# Patient Record
Sex: MALE | Race: WHITE | NOT HISPANIC OR LATINO | Employment: FULL TIME | URBAN - METROPOLITAN AREA
[De-identification: names, ages, dates, MRNs, and addresses within clinical notes are randomized per-mention and may not be internally consistent; named-entity substitution may affect disease eponyms.]

---

## 2019-01-09 ENCOUNTER — OFFICE VISIT (OUTPATIENT)
Dept: INTERNAL MEDICINE | Facility: CLINIC | Age: 39
End: 2019-01-09
Payer: OTHER GOVERNMENT

## 2019-01-09 VITALS
WEIGHT: 202.63 LBS | BODY MASS INDEX: 29.01 KG/M2 | HEART RATE: 65 BPM | HEIGHT: 70 IN | DIASTOLIC BLOOD PRESSURE: 89 MMHG | SYSTOLIC BLOOD PRESSURE: 125 MMHG | TEMPERATURE: 98 F

## 2019-01-09 DIAGNOSIS — Z00.00 GENERAL MEDICAL EXAM: Primary | ICD-10-CM

## 2019-01-09 DIAGNOSIS — E66.3 OVERWEIGHT (BMI 25.0-29.9): ICD-10-CM

## 2019-01-09 DIAGNOSIS — K21.9 GASTROESOPHAGEAL REFLUX DISEASE, ESOPHAGITIS PRESENCE NOT SPECIFIED: ICD-10-CM

## 2019-01-09 PROCEDURE — 99999 PR PBB SHADOW E&M-NEW PATIENT-LVL III: ICD-10-PCS | Mod: PBBFAC,,, | Performed by: FAMILY MEDICINE

## 2019-01-09 PROCEDURE — 99999 PR PBB SHADOW E&M-NEW PATIENT-LVL III: CPT | Mod: PBBFAC,,, | Performed by: FAMILY MEDICINE

## 2019-01-09 PROCEDURE — 99395 PREV VISIT EST AGE 18-39: CPT | Mod: S$GLB,,, | Performed by: FAMILY MEDICINE

## 2019-01-09 PROCEDURE — 99395 PR PREVENTIVE VISIT,EST,18-39: ICD-10-PCS | Mod: S$GLB,,, | Performed by: FAMILY MEDICINE

## 2019-01-09 RX ORDER — PANTOPRAZOLE SODIUM 40 MG/1
40 TABLET, DELAYED RELEASE ORAL DAILY
Qty: 30 TABLET | Refills: 1 | Status: SHIPPED | OUTPATIENT
Start: 2019-01-09 | End: 2019-03-13 | Stop reason: SDUPTHER

## 2019-01-10 PROBLEM — K21.9 GASTROESOPHAGEAL REFLUX DISEASE: Status: ACTIVE | Noted: 2019-01-10

## 2019-01-10 PROBLEM — E66.3 OVERWEIGHT (BMI 25.0-29.9): Status: ACTIVE | Noted: 2019-01-10

## 2019-01-10 NOTE — PROGRESS NOTES
Subjective:   Patient ID: Jayesh Rose is a 38 y.o. male.    Chief Complaint: Annual Exam and Establish Care      HPI  37 yo male here for annual exam.     Patient queried and denies any further complaints    PREVENTIVE MED  Diet  Exercise  Colorectal Ca  Alcohol use  Tobacco  BP  Depression  Type 2 DM  Hep C  STD  Vision  ALL REVIEWED        PAST MEDICAL HISTORY:  History reviewed. No pertinent past medical history.    PAST SURGICAL HISTORY:  History reviewed. No pertinent surgical history.    SOCIAL HISTORY:  Social History     Socioeconomic History    Marital status:      Spouse name: Not on file    Number of children: Not on file    Years of education: Not on file    Highest education level: Not on file   Social Needs    Financial resource strain: Not on file    Food insecurity - worry: Not on file    Food insecurity - inability: Not on file    Transportation needs - medical: Not on file    Transportation needs - non-medical: Not on file   Occupational History    Not on file   Tobacco Use    Smoking status: Never Smoker   Substance and Sexual Activity    Alcohol use: Not on file    Drug use: No    Sexual activity: Not on file   Other Topics Concern    Not on file   Social History Narrative    Not on file       FAMILY HISTORY:  Family History   Problem Relation Age of Onset    Mental illness Mother     Heart disease Father     Mental illness Father     Heart disease Brother     Cancer Maternal Grandfather     Cancer Paternal Grandfather        ALLERGIES AND MEDICATIONS: updated and reviewed.  Review of patient's allergies indicates:  No Known Allergies    Current Outpatient Medications:     pantoprazole (PROTONIX) 40 MG tablet, Take 1 tablet (40 mg total) by mouth once daily., Disp: 30 tablet, Rfl: 1    Review of Systems   Constitutional: Negative for activity change, appetite change, chills, diaphoresis, fatigue, fever and unexpected weight change.   HENT: Negative for  "congestion, ear discharge, ear pain, facial swelling, hearing loss, nosebleeds, postnasal drip, rhinorrhea, sinus pressure, sneezing, sore throat, tinnitus, trouble swallowing and voice change.    Eyes: Negative for photophobia, pain, discharge, redness, itching and visual disturbance.   Respiratory: Negative for cough, chest tightness, shortness of breath and wheezing.    Cardiovascular: Negative for chest pain, palpitations and leg swelling.   Gastrointestinal: Negative for abdominal distention, abdominal pain, anal bleeding, blood in stool, constipation, diarrhea, nausea, rectal pain and vomiting.   Endocrine: Negative for cold intolerance, heat intolerance, polydipsia, polyphagia and polyuria.   Genitourinary: Negative for difficulty urinating, dysuria and flank pain.   Musculoskeletal: Negative for arthralgias, back pain, joint swelling, myalgias and neck pain.   Skin: Negative for rash.   Neurological: Negative for dizziness, tremors, seizures, syncope, speech difficulty, weakness, light-headedness, numbness and headaches.   Psychiatric/Behavioral: Negative for behavioral problems, confusion, decreased concentration, dysphoric mood, sleep disturbance and suicidal ideas. The patient is not nervous/anxious and is not hyperactive.        Objective:     Vitals:    01/09/19 1405   BP: 125/89   Pulse: 65   Temp: 98.1 °F (36.7 °C)   TempSrc: Oral   Weight: 91.9 kg (202 lb 9.6 oz)   Height: 5' 10" (1.778 m)   PainSc:   4   PainLoc: Back     Body mass index is 29.07 kg/m².    Physical Exam   Constitutional: He is oriented to person, place, and time. He appears well-developed and well-nourished. He is cooperative. He does not have a sickly appearance. No distress.   HENT:   Head: Normocephalic and atraumatic.   Right Ear: Hearing, tympanic membrane, external ear and ear canal normal. No tenderness.   Left Ear: Hearing, tympanic membrane, external ear and ear canal normal. No tenderness.   Nose: Nose normal. "   Mouth/Throat: Oropharynx is clear and moist.   Eyes: Conjunctivae and lids are normal. Pupils are equal, round, and reactive to light. Right eye exhibits no discharge. Left eye exhibits no discharge. Right conjunctiva is not injected. Left conjunctiva is not injected. No scleral icterus. Right eye exhibits normal extraocular motion. Left eye exhibits normal extraocular motion.   Neck: Normal range of motion. Neck supple. No JVD present. Carotid bruit is not present. No tracheal deviation and no edema present. No thyromegaly present.   Cardiovascular: Normal rate, regular rhythm, normal heart sounds and normal pulses. Exam reveals no friction rub.   No murmur heard.  Pulmonary/Chest: Effort normal and breath sounds normal. No accessory muscle usage. No respiratory distress. He has no wheezes. He has no rhonchi. He has no rales.   Abdominal: Soft. Bowel sounds are normal. He exhibits no distension, no abdominal bruit, no pulsatile midline mass and no mass. There is no hepatosplenomegaly. There is no tenderness. There is no rebound, no guarding, no CVA tenderness, no tenderness at McBurney's point and negative Hussein's sign.   Musculoskeletal: He exhibits no edema.   Lymphadenopathy:        Head (right side): No submandibular, no preauricular and no posterior auricular adenopathy present.        Head (left side): No submandibular, no preauricular and no posterior auricular adenopathy present.     He has no cervical adenopathy.   Neurological: He is alert and oriented to person, place, and time. GCS eye subscore is 4. GCS verbal subscore is 5. GCS motor subscore is 6.   Skin: Skin is warm and dry. No ecchymosis and no rash noted. Rash is not maculopapular and not urticarial. He is not diaphoretic. No cyanosis or erythema. Nails show no clubbing.   Psychiatric: He has a normal mood and affect. His speech is normal and behavior is normal. Thought content normal. His mood appears not anxious. His affect is not angry and  not inappropriate. He does not exhibit a depressed mood.   Nursing note and vitals reviewed.      Assessment and Plan:   Jayesh was seen today for annual exam and establish care.    Diagnoses and all orders for this visit:    General medical exam  -     Echocardiogram stress test (Cupid Only); Future  -     Cancel: CBC auto differential; Future  -     Cancel: Comprehensive metabolic panel; Future  -     Cancel: Hemoglobin A1c; Future  -     Cancel: Lipid panel; Future  -     Cancel: TSH; Future  -     Cancel: T4, free; Future  -     CBC auto differential; Future  -     Comprehensive metabolic panel; Future  -     Hemoglobin A1c; Future  -     Lipid panel; Future  -     TSH; Future  -     T4, free; Future  -     CBC auto differential  -     Comprehensive metabolic panel  -     Hemoglobin A1c  -     Lipid panel  -     TSH  -     T4, free    Overweight (BMI 25.0-29.9)    Gastroesophageal reflux disease, esophagitis presence not specified    Other orders  -     pantoprazole (PROTONIX) 40 MG tablet; Take 1 tablet (40 mg total) by mouth once daily.        Follow-up in about 6 months (around 7/9/2019).      THIS NOTE WILL BE SHARED WITH THE PATIENT.

## 2019-01-15 ENCOUNTER — CLINICAL SUPPORT (OUTPATIENT)
Dept: CARDIOLOGY | Facility: CLINIC | Age: 39
End: 2019-01-15
Attending: FAMILY MEDICINE
Payer: OTHER GOVERNMENT

## 2019-01-15 VITALS — BODY MASS INDEX: 28.92 KG/M2 | HEIGHT: 70 IN | WEIGHT: 202 LBS

## 2019-01-15 DIAGNOSIS — Z00.00 GENERAL MEDICAL EXAM: ICD-10-CM

## 2019-01-15 LAB
AORTIC ROOT ANNULUS: 1.88 CM
ASCENDING AORTA: 3.12 CM
BSA FOR ECHO PROCEDURE: 2.13 M2
CV ECHO LV RWT: 0.32 CM
CV STRESS BASE HR: 77
DIASTOLIC BLOOD PRESSURE: 76
DOP CALC LVOT AREA: 3.17 CM2
DOP CALC LVOT DIAMETER: 2.01 CM
DOP CALC LVOT STROKE VOLUME: 91.59 CM3
DOP CALCLVOT PEAK VEL VTI: 28.88 CM
E WAVE DECELERATION TIME: 218.01 MSEC
E/A RATIO: 1.17
E/E' RATIO: 5.79
ECHO LV POSTERIOR WALL: 0.78 CM (ref 0.6–1.1)
FRACTIONAL SHORTENING: 51 % (ref 28–44)
INTERVENTRICULAR SEPTUM: 0.71 CM (ref 0.6–1.1)
IVRT: 0.08 MSEC
LA MAJOR: 5.58 CM
LA MINOR: 5.77 CM
LA WIDTH: 3.51 CM
LEFT ATRIUM SIZE: 3.84 CM
LEFT ATRIUM VOLUME INDEX: 31 ML/M2
LEFT ATRIUM VOLUME: 65 CM3
LEFT INTERNAL DIMENSION IN SYSTOLE: 2.39 CM (ref 2.1–4)
LEFT VENTRICLE DIASTOLIC VOLUME INDEX: 54.44 ML/M2
LEFT VENTRICLE DIASTOLIC VOLUME: 114.12 ML
LEFT VENTRICLE MASS INDEX: 57.6 G/M2
LEFT VENTRICLE SYSTOLIC VOLUME INDEX: 9.5 ML/M2
LEFT VENTRICLE SYSTOLIC VOLUME: 20.01 ML
LEFT VENTRICULAR INTERNAL DIMENSION IN DIASTOLE: 4.92 CM (ref 3.5–6)
LEFT VENTRICULAR MASS: 120.64 G
LV LATERAL E/E' RATIO: 5.06
LV SEPTAL E/E' RATIO: 6.75
MV PEAK A VEL: 0.69 M/S
MV PEAK E VEL: 0.81 M/S
OHS CV CPX 1 MINUTE RECOVERY HEART RATE: 141 BPM
OHS CV CPX 85 PERCENT MAX PREDICTED HEART RATE MALE: 156
OHS CV CPX ESTIMATED METS: 15
OHS CV CPX MAX PREDICTED HEART RATE: 184
OHS CV CPX PATIENT IS FEMALE: 0
OHS CV CPX PATIENT IS MALE: 1
OHS CV CPX PEAK DIASTOLIC BLOOD PRESSURE: 71 MMHG
OHS CV CPX PEAK HEAR RATE: 171
OHS CV CPX PEAK RATE PRESSURE PRODUCT: NORMAL
OHS CV CPX PEAK SYSTOLIC BLOOD PRESSURE: 155
OHS CV CPX PERCENT MAX PREDICTED HEART RATE ACHIEVED: 93
OHS CV CPX PERCENT TARGET HEART RATE ACHIEVED: 109.62
OHS CV CPX RATE PRESSURE PRODUCT PRESENTING: NORMAL
OHS CV CPX TARGET HEART RATE: 156
PULM VEIN S/D RATIO: 0.81
PV PEAK D VEL: 0.75 M/S
PV PEAK S VEL: 0.61 M/S
RA MAJOR: 4.83 CM
RA PRESSURE: 3 MMHG
RA WIDTH: 2.78 CM
RIGHT VENTRICULAR END-DIASTOLIC DIMENSION: 3.27 CM
STJ: 3.21 CM
STRESS ECHO POST EXERCISE DUR MIN: 9 MIN
STRESS ECHO POST EXERCISE DUR SEC: 16
SYSTOLIC BLOOD PRESSURE: 137
TDI LATERAL: 0.16
TDI SEPTAL: 0.12
TDI: 0.14

## 2019-01-15 PROCEDURE — 93351 STRESS TTE COMPLETE: CPT | Mod: S$GLB,,, | Performed by: INTERNAL MEDICINE

## 2019-01-15 PROCEDURE — 93351 ECHOCARDIOGRAM STRESS TEST (CUPID ONLY): ICD-10-PCS | Mod: S$GLB,,, | Performed by: INTERNAL MEDICINE

## 2019-01-15 PROCEDURE — 99999 PR PBB SHADOW E&M-EST. PATIENT-LVL I: ICD-10-PCS | Mod: PBBFAC,,,

## 2019-01-15 PROCEDURE — 99999 PR PBB SHADOW E&M-EST. PATIENT-LVL I: CPT | Mod: PBBFAC,,,

## 2019-01-16 ENCOUNTER — TELEPHONE (OUTPATIENT)
Dept: INTERNAL MEDICINE | Facility: CLINIC | Age: 39
End: 2019-01-16

## 2019-01-16 NOTE — TELEPHONE ENCOUNTER
----- Message from Du Shaw MD sent at 1/15/2019  4:25 PM CST -----  The stress echocardiogram report was normal. Good news! Encourage pt to sign up for [facility]. Thank you

## 2019-01-17 LAB
ALBUMIN SERPL-MCNC: 4.5 G/DL (ref 3.6–5.1)
ALBUMIN/GLOB SERPL: 1.9 (CALC) (ref 1–2.5)
ALP SERPL-CCNC: 69 U/L (ref 40–115)
ALT SERPL-CCNC: 20 U/L (ref 9–46)
AST SERPL-CCNC: 18 U/L (ref 10–40)
BASOPHILS # BLD AUTO: 32 CELLS/UL (ref 0–200)
BASOPHILS NFR BLD AUTO: 0.4 %
BILIRUB SERPL-MCNC: 0.4 MG/DL (ref 0.2–1.2)
BUN SERPL-MCNC: 15 MG/DL (ref 7–25)
BUN/CREAT SERPL: NORMAL (CALC) (ref 6–22)
CALCIUM SERPL-MCNC: 9.6 MG/DL (ref 8.6–10.3)
CHLORIDE SERPL-SCNC: 104 MMOL/L (ref 98–110)
CHOLEST SERPL-MCNC: 198 MG/DL
CHOLEST/HDLC SERPL: 3.8 (CALC)
CO2 SERPL-SCNC: 25 MMOL/L (ref 20–32)
CREAT SERPL-MCNC: 1.15 MG/DL (ref 0.6–1.35)
EOSINOPHIL # BLD AUTO: 464 CELLS/UL (ref 15–500)
EOSINOPHIL NFR BLD AUTO: 5.8 %
ERYTHROCYTE [DISTWIDTH] IN BLOOD BY AUTOMATED COUNT: 12.6 % (ref 11–15)
GFR SERPL CREATININE-BSD FRML MDRD: 81 ML/MIN/1.73M2
GLOBULIN SER CALC-MCNC: 2.4 G/DL (CALC) (ref 1.9–3.7)
GLUCOSE SERPL-MCNC: 92 MG/DL (ref 65–99)
HBA1C MFR BLD: 5 % OF TOTAL HGB
HCT VFR BLD AUTO: 47.7 % (ref 38.5–50)
HDLC SERPL-MCNC: 52 MG/DL
HGB BLD-MCNC: 16 G/DL (ref 13.2–17.1)
LDLC SERPL CALC-MCNC: 118 MG/DL (CALC)
LYMPHOCYTES # BLD AUTO: 3016 CELLS/UL (ref 850–3900)
LYMPHOCYTES NFR BLD AUTO: 37.7 %
MCH RBC QN AUTO: 28.4 PG (ref 27–33)
MCHC RBC AUTO-ENTMCNC: 33.5 G/DL (ref 32–36)
MCV RBC AUTO: 84.6 FL (ref 80–100)
MONOCYTES # BLD AUTO: 680 CELLS/UL (ref 200–950)
MONOCYTES NFR BLD AUTO: 8.5 %
NEUTROPHILS # BLD AUTO: 3808 CELLS/UL (ref 1500–7800)
NEUTROPHILS NFR BLD AUTO: 47.6 %
NONHDLC SERPL-MCNC: 146 MG/DL (CALC)
PLATELET # BLD AUTO: 231 THOUSAND/UL (ref 140–400)
PMV BLD REES-ECKER: 11.6 FL (ref 7.5–12.5)
POTASSIUM SERPL-SCNC: 4 MMOL/L (ref 3.5–5.3)
PROT SERPL-MCNC: 6.9 G/DL (ref 6.1–8.1)
RBC # BLD AUTO: 5.64 MILLION/UL (ref 4.2–5.8)
SODIUM SERPL-SCNC: 141 MMOL/L (ref 135–146)
T4 FREE SERPL-MCNC: 1.2 NG/DL (ref 0.8–1.8)
TRIGL SERPL-MCNC: 168 MG/DL
TSH SERPL-ACNC: 0.5 MIU/L (ref 0.4–4.5)
WBC # BLD AUTO: 8 THOUSAND/UL (ref 3.8–10.8)

## 2019-01-19 ENCOUNTER — PATIENT MESSAGE (OUTPATIENT)
Dept: INTERNAL MEDICINE | Facility: CLINIC | Age: 39
End: 2019-01-19

## 2019-01-21 ENCOUNTER — PATIENT MESSAGE (OUTPATIENT)
Dept: INTERNAL MEDICINE | Facility: CLINIC | Age: 39
End: 2019-01-21

## 2019-03-13 RX ORDER — PANTOPRAZOLE SODIUM 40 MG/1
TABLET, DELAYED RELEASE ORAL
Qty: 30 TABLET | Refills: 0 | Status: SHIPPED | OUTPATIENT
Start: 2019-03-13 | End: 2023-05-18

## 2019-03-18 ENCOUNTER — PATIENT MESSAGE (OUTPATIENT)
Dept: INTERNAL MEDICINE | Facility: CLINIC | Age: 39
End: 2019-03-18

## 2020-09-11 ENCOUNTER — LAB VISIT (OUTPATIENT)
Dept: URGENT CARE | Facility: CLINIC | Age: 40
End: 2020-09-11

## 2020-09-11 DIAGNOSIS — Z03.818 ENCOUNTER FOR OBSERVATION FOR SUSPECTED EXPOSURE TO OTHER BIOLOGICAL AGENTS RULED OUT: ICD-10-CM

## 2020-09-11 LAB
CTP QC/QA: YES
SARS-COV-2 RDRP RESP QL NAA+PROBE: NEGATIVE

## 2020-09-11 PROCEDURE — U0002 COVID-19 LAB TEST NON-CDC: HCPCS | Mod: S$GLB,,, | Performed by: EMERGENCY MEDICINE

## 2020-09-11 PROCEDURE — U0002: ICD-10-PCS | Mod: S$GLB,,, | Performed by: EMERGENCY MEDICINE

## 2020-10-08 ENCOUNTER — PATIENT MESSAGE (OUTPATIENT)
Dept: PRIMARY CARE CLINIC | Facility: CLINIC | Age: 40
End: 2020-10-08

## 2020-11-12 ENCOUNTER — LAB VISIT (OUTPATIENT)
Dept: URGENT CARE | Facility: CLINIC | Age: 40
End: 2020-11-12
Payer: OTHER GOVERNMENT

## 2020-11-12 DIAGNOSIS — Z03.818 ENCOUNTER FOR OBSERVATION FOR SUSPECTED EXPOSURE TO OTHER BIOLOGICAL AGENTS RULED OUT: ICD-10-CM

## 2020-11-12 PROCEDURE — U0003 INFECTIOUS AGENT DETECTION BY NUCLEIC ACID (DNA OR RNA); SEVERE ACUTE RESPIRATORY SYNDROME CORONAVIRUS 2 (SARS-COV-2) (CORONAVIRUS DISEASE [COVID-19]), AMPLIFIED PROBE TECHNIQUE, MAKING USE OF HIGH THROUGHPUT TECHNOLOGIES AS DESCRIBED BY CMS-2020-01-R: HCPCS

## 2020-11-13 LAB — SARS-COV-2 RNA RESP QL NAA+PROBE: NOT DETECTED

## 2020-11-19 ENCOUNTER — LAB VISIT (OUTPATIENT)
Dept: URGENT CARE | Facility: CLINIC | Age: 40
End: 2020-11-19
Payer: OTHER GOVERNMENT

## 2020-11-19 DIAGNOSIS — Z03.818 ENCOUNTER FOR OBSERVATION FOR SUSPECTED EXPOSURE TO OTHER BIOLOGICAL AGENTS RULED OUT: ICD-10-CM

## 2020-11-19 LAB — SARS-COV-2 RNA RESP QL NAA+PROBE: NOT DETECTED

## 2020-11-19 PROCEDURE — U0003 INFECTIOUS AGENT DETECTION BY NUCLEIC ACID (DNA OR RNA); SEVERE ACUTE RESPIRATORY SYNDROME CORONAVIRUS 2 (SARS-COV-2) (CORONAVIRUS DISEASE [COVID-19]), AMPLIFIED PROBE TECHNIQUE, MAKING USE OF HIGH THROUGHPUT TECHNOLOGIES AS DESCRIBED BY CMS-2020-01-R: HCPCS

## 2020-12-17 ENCOUNTER — LAB VISIT (OUTPATIENT)
Dept: URGENT CARE | Facility: CLINIC | Age: 40
End: 2020-12-17
Attending: INTERNAL MEDICINE
Payer: OTHER GOVERNMENT

## 2020-12-17 DIAGNOSIS — Z03.818 ENCOUNTER FOR OBSERVATION FOR SUSPECTED EXPOSURE TO OTHER BIOLOGICAL AGENTS RULED OUT: ICD-10-CM

## 2020-12-17 PROCEDURE — U0003 INFECTIOUS AGENT DETECTION BY NUCLEIC ACID (DNA OR RNA); SEVERE ACUTE RESPIRATORY SYNDROME CORONAVIRUS 2 (SARS-COV-2) (CORONAVIRUS DISEASE [COVID-19]), AMPLIFIED PROBE TECHNIQUE, MAKING USE OF HIGH THROUGHPUT TECHNOLOGIES AS DESCRIBED BY CMS-2020-01-R: HCPCS

## 2020-12-18 LAB — SARS-COV-2 RNA RESP QL NAA+PROBE: NOT DETECTED

## 2020-12-30 ENCOUNTER — LAB VISIT (OUTPATIENT)
Dept: URGENT CARE | Facility: CLINIC | Age: 40
End: 2020-12-30
Payer: OTHER GOVERNMENT

## 2020-12-30 DIAGNOSIS — Z03.818 ENCOUNTER FOR OBSERVATION FOR SUSPECTED EXPOSURE TO OTHER BIOLOGICAL AGENTS RULED OUT: ICD-10-CM

## 2020-12-30 PROCEDURE — U0003 INFECTIOUS AGENT DETECTION BY NUCLEIC ACID (DNA OR RNA); SEVERE ACUTE RESPIRATORY SYNDROME CORONAVIRUS 2 (SARS-COV-2) (CORONAVIRUS DISEASE [COVID-19]), AMPLIFIED PROBE TECHNIQUE, MAKING USE OF HIGH THROUGHPUT TECHNOLOGIES AS DESCRIBED BY CMS-2020-01-R: HCPCS

## 2020-12-31 LAB — SARS-COV-2 RNA RESP QL NAA+PROBE: NOT DETECTED

## 2021-01-04 ENCOUNTER — PATIENT MESSAGE (OUTPATIENT)
Dept: ADMINISTRATIVE | Facility: HOSPITAL | Age: 41
End: 2021-01-04

## 2021-01-07 ENCOUNTER — OFFICE VISIT (OUTPATIENT)
Dept: UROLOGY | Facility: CLINIC | Age: 41
End: 2021-01-07
Attending: UROLOGY
Payer: OTHER GOVERNMENT

## 2021-01-07 VITALS
WEIGHT: 202 LBS | HEIGHT: 70 IN | BODY MASS INDEX: 28.92 KG/M2 | DIASTOLIC BLOOD PRESSURE: 70 MMHG | SYSTOLIC BLOOD PRESSURE: 119 MMHG | HEART RATE: 66 BPM

## 2021-01-07 DIAGNOSIS — Z30.09 ENCOUNTER FOR VASECTOMY COUNSELING: Primary | ICD-10-CM

## 2021-01-07 PROCEDURE — 99204 PR OFFICE/OUTPT VISIT, NEW, LEVL IV, 45-59 MIN: ICD-10-PCS | Mod: S$GLB,,, | Performed by: UROLOGY

## 2021-01-07 PROCEDURE — 99204 OFFICE O/P NEW MOD 45 MIN: CPT | Mod: S$GLB,,, | Performed by: UROLOGY

## 2021-01-07 RX ORDER — LEVETIRACETAM 1000 MG/1
1000 TABLET ORAL DAILY
Status: ON HOLD | COMMUNITY
Start: 2020-12-02 | End: 2023-05-24 | Stop reason: HOSPADM

## 2021-01-07 RX ORDER — DIAZEPAM 5 MG/1
5 TABLET ORAL ONCE
Qty: 1 TABLET | Refills: 0 | Status: ON HOLD | OUTPATIENT
Start: 2021-01-07 | End: 2023-05-24 | Stop reason: HOSPADM

## 2021-02-15 ENCOUNTER — PROCEDURE VISIT (OUTPATIENT)
Dept: UROLOGY | Facility: CLINIC | Age: 41
End: 2021-02-15
Attending: UROLOGY
Payer: OTHER GOVERNMENT

## 2021-02-15 VITALS
SYSTOLIC BLOOD PRESSURE: 109 MMHG | WEIGHT: 202 LBS | DIASTOLIC BLOOD PRESSURE: 78 MMHG | HEIGHT: 70 IN | BODY MASS INDEX: 28.92 KG/M2 | HEART RATE: 74 BPM

## 2021-02-15 DIAGNOSIS — Z30.09 ENCOUNTER FOR VASECTOMY COUNSELING: Primary | ICD-10-CM

## 2021-02-15 PROCEDURE — 55250 VASECTOMY: ICD-10-PCS | Mod: S$GLB,,, | Performed by: UROLOGY

## 2021-02-15 PROCEDURE — 55250 REMOVAL OF SPERM DUCT(S): CPT | Mod: S$GLB,,, | Performed by: UROLOGY

## 2021-02-15 RX ORDER — LIDOCAINE HYDROCHLORIDE AND EPINEPHRINE 20; 10 MG/ML; UG/ML
5 INJECTION, SOLUTION INFILTRATION; PERINEURAL
Status: COMPLETED | OUTPATIENT
Start: 2021-02-15 | End: 2021-02-15

## 2021-02-15 RX ADMIN — LIDOCAINE HYDROCHLORIDE AND EPINEPHRINE 5 ML: 20; 10 INJECTION, SOLUTION INFILTRATION; PERINEURAL at 11:02

## 2021-03-12 ENCOUNTER — IMMUNIZATION (OUTPATIENT)
Dept: PRIMARY CARE CLINIC | Facility: CLINIC | Age: 41
End: 2021-03-12
Payer: OTHER GOVERNMENT

## 2021-03-12 DIAGNOSIS — Z23 NEED FOR VACCINATION: Primary | ICD-10-CM

## 2021-03-12 PROCEDURE — 91300 PR SARS-COV- 2 COVID-19 VACCINE, NO PRSV, 30MCG/0.3ML, IM: CPT | Mod: S$GLB,,, | Performed by: INTERNAL MEDICINE

## 2021-03-12 PROCEDURE — 0001A PR IMMUNIZ ADMIN, SARS-COV-2 COVID-19 VACC, 30MCG/0.3ML, 1ST DOSE: CPT | Mod: CV19,S$GLB,, | Performed by: INTERNAL MEDICINE

## 2021-03-12 PROCEDURE — 91300 PR SARS-COV- 2 COVID-19 VACCINE, NO PRSV, 30MCG/0.3ML, IM: ICD-10-PCS | Mod: S$GLB,,, | Performed by: INTERNAL MEDICINE

## 2021-03-12 PROCEDURE — 0001A PR IMMUNIZ ADMIN, SARS-COV-2 COVID-19 VACC, 30MCG/0.3ML, 1ST DOSE: ICD-10-PCS | Mod: CV19,S$GLB,, | Performed by: INTERNAL MEDICINE

## 2021-03-12 RX ADMIN — Medication 0.3 ML: at 02:03

## 2021-04-02 ENCOUNTER — IMMUNIZATION (OUTPATIENT)
Dept: PRIMARY CARE CLINIC | Facility: CLINIC | Age: 41
End: 2021-04-02
Payer: OTHER GOVERNMENT

## 2021-04-02 DIAGNOSIS — Z23 NEED FOR VACCINATION: Primary | ICD-10-CM

## 2021-04-02 PROCEDURE — 91300 PR SARS-COV- 2 COVID-19 VACCINE, NO PRSV, 30MCG/0.3ML, IM: ICD-10-PCS | Mod: S$GLB,,, | Performed by: INTERNAL MEDICINE

## 2021-04-02 PROCEDURE — 0002A PR IMMUNIZ ADMIN, SARS-COV-2 COVID-19 VACC, 30MCG/0.3ML, 2ND DOSE: CPT | Mod: CV19,S$GLB,, | Performed by: INTERNAL MEDICINE

## 2021-04-02 PROCEDURE — 0002A PR IMMUNIZ ADMIN, SARS-COV-2 COVID-19 VACC, 30MCG/0.3ML, 2ND DOSE: ICD-10-PCS | Mod: CV19,S$GLB,, | Performed by: INTERNAL MEDICINE

## 2021-04-02 PROCEDURE — 91300 PR SARS-COV- 2 COVID-19 VACCINE, NO PRSV, 30MCG/0.3ML, IM: CPT | Mod: S$GLB,,, | Performed by: INTERNAL MEDICINE

## 2021-04-02 RX ADMIN — Medication 0.3 ML: at 03:04

## 2021-04-05 ENCOUNTER — PATIENT MESSAGE (OUTPATIENT)
Dept: ADMINISTRATIVE | Facility: HOSPITAL | Age: 41
End: 2021-04-05

## 2021-05-16 ENCOUNTER — PATIENT MESSAGE (OUTPATIENT)
Dept: UROLOGY | Facility: CLINIC | Age: 41
End: 2021-05-16

## 2021-05-17 ENCOUNTER — OFFICE VISIT (OUTPATIENT)
Dept: UROLOGY | Facility: CLINIC | Age: 41
End: 2021-05-17
Attending: UROLOGY
Payer: OTHER GOVERNMENT

## 2021-05-17 VITALS
SYSTOLIC BLOOD PRESSURE: 118 MMHG | WEIGHT: 202 LBS | DIASTOLIC BLOOD PRESSURE: 78 MMHG | HEIGHT: 70 IN | BODY MASS INDEX: 28.92 KG/M2 | HEART RATE: 78 BPM

## 2021-05-17 DIAGNOSIS — Z30.09 ENCOUNTER FOR VASECTOMY COUNSELING: Primary | ICD-10-CM

## 2021-05-17 PROCEDURE — 99024 PR POST-OP FOLLOW-UP VISIT: ICD-10-PCS | Mod: S$GLB,,, | Performed by: UROLOGY

## 2021-05-17 PROCEDURE — 99024 POSTOP FOLLOW-UP VISIT: CPT | Mod: S$GLB,,, | Performed by: UROLOGY

## 2021-07-06 ENCOUNTER — PATIENT MESSAGE (OUTPATIENT)
Dept: ADMINISTRATIVE | Facility: HOSPITAL | Age: 41
End: 2021-07-06

## 2021-10-05 ENCOUNTER — PATIENT MESSAGE (OUTPATIENT)
Dept: ADMINISTRATIVE | Facility: HOSPITAL | Age: 41
End: 2021-10-05

## 2023-04-17 DIAGNOSIS — M50.30 DDD (DEGENERATIVE DISC DISEASE), CERVICAL: Primary | ICD-10-CM

## 2023-04-24 NOTE — PROGRESS NOTES
DATE: 4/27/2023  PATIENT: Jayesh Rose    Supervising Physician: Evans Nance M.D.    CHIEF COMPLAINT: right shoulder and low back pain    HISTORY:  Jayesh Rose is a 42 y.o. male here for initial evaluation of low back and bilateral leg pain (Back - 8, Leg - 8). The pain has been present for years, worsening over time. The patient describes the pain as stiff and aching and radiating down the back of both legs.  The pain is worse in the morning and improved by rest. There is negative associated numbness and tingling. There is negative subjective weakness. Prior treatments have included PT, but no ESIs or surgery.    The patient also has complaints of neck and right shoulder/arm pain (Neck - 8, Arm - 8).  The pain has been present for a few years without injury. The patient describes the pain as aching. The pain is worse with overhead movement and improved by rest. There is mild associated numbness and tingling at night. There is negative subjective weakness. Prior treatments have included no previous PT, ESIs surgery. Pt says his pain has mildly improved recently.     The patient denies myelopathic symptoms such as handwriting changes or difficulty with buttons/coins/keys. Denies perineal paresthesias, bowel/bladder dysfunction.    PAST MEDICAL/SURGICAL HISTORY:  No past medical history on file.  No past surgical history on file.    Medications:   Current Outpatient Medications on File Prior to Visit   Medication Sig Dispense Refill    diazePAM (VALIUM) 5 MG tablet Take 1 tablet (5 mg total) by mouth once. Take 30 minutes before procedure. for 1 dose 1 tablet 0    levETIRAcetam (KEPPRA) 1000 MG tablet Take 1,000 mg by mouth every 12 (twelve) hours.      pantoprazole (PROTONIX) 40 MG tablet TAKE 1 TABLET BY MOUTH EVERY DAY 30 tablet 0     No current facility-administered medications on file prior to visit.       Social History:   Social History     Socioeconomic History    Marital status:     Tobacco Use    Smoking status: Never    Smokeless tobacco: Never   Substance and Sexual Activity    Drug use: No    Sexual activity: Yes       REVIEW OF SYSTEMS:  Constitution: Negative. Negative for chills, fever and night sweats.   Cardiovascular: Negative for chest pain and syncope.   Respiratory: Negative for cough and shortness of breath.   Gastrointestinal: See HPI. Negative for nausea/vomiting. Negative for abdominal pain.  Genitourinary: See HPI. Negative for discoloration or dysuria.  Skin: Negative for dry skin, itching and rash.   Hematologic/Lymphatic: Negative for bleeding problem. Does not bruise/bleed easily.   Musculoskeletal: Negative for falls and muscle weakness.   Neurological: See HPI. No seizures.   Endocrine: Negative for polydipsia, polyphagia and polyuria.   Allergic/Immunologic: Negative for hives and persistent infections.     EXAM:  There were no vitals taken for this visit.    PHYSICAL EXAMINATION:    General: The patient is a very pleasant 42 y.o. male in no apparent distress, the patient is oriented to person, place and time.  Psych: Normal mood and affect  HEENT: Vision grossly intact, hearing intact to the spoken word.  Lungs: Respirations unlabored.  Gait: Normal station and gait, no difficulty with toe or heel walk.   Skin: Cervical skin and dorsal lumbar skin negative for rashes, lesions, hairy patches and surgical scars.    Range of motion: Cervical and lumbar range of motion is acceptable. There is mild tenderness to palpation of the paracervical muscles.  There is mild lumbar tenderness to palpation.  Spinal Balance: Global saggital and coronal spinal balance acceptable, no significant for scoliosis and kyphosis.  Musculoskeletal: No pain with the range of motion of the bilateral shoulders and elbows. Normal bulk and contour of the bilateral hands.  No pain with the range of motion of the bilateral hips. Mild bilateral trochanteric tenderness to palpation.  Vascular:  Bilateral upper and lower extremities warm and well perfused, radial pulses 2+ bilaterally, dorsalis pedis pulses 2+ bilaterally.  Neurological: Normal strength and tone in all major motor groups in the bilateral upper and lower extremities. Normal sensation to light touch in the C5-T1 and L2-S1 dermatomes bilaterally.  Deep tendon reflexes symmetric 2+ in the bilateral upper and lower extremities.  Negative Inverted Radial Reflex and Martin's bilaterally. Negative Babinski bilaterally. Negative straight leg raise bilaterally.     IMAGING:   Today I personally reviewed AP, Lat and Flex/Ex  upright C-spine films that demonstrate disc space narrowing at C5-6.    AP, Lat and Flex/Ex upright lumbar spine films demonstrate significant disc space narrowing at L5-S1.     There is no height or weight on file to calculate BMI.    Hemoglobin A1C   Date Value Ref Range Status   01/16/2019 5.0 <5.7 % of total Hgb Final     Comment:     For the purpose of screening for the presence of  diabetes:     <5.7%       Consistent with the absence of diabetes  5.7-6.4%    Consistent with increased risk for diabetes              (prediabetes)  > or =6.5%  Consistent with diabetes     This assay result is consistent with a decreased risk  of diabetes.     Currently, no consensus exists regarding use of  hemoglobin A1c for diagnosis of diabetes in children.     According to American Diabetes Association (ADA)  guidelines, hemoglobin A1c <7.0% represents optimal  control in non-pregnant diabetic patients. Different  metrics may apply to specific patient populations.   Standards of Medical Care in Diabetes(ADA).               ASSESSMENT/PLAN:    There are no diagnoses linked to this encounter.    Today we discussed at length all of the different treatment options including anti-inflammatories, acetaminophen, rest, ice, heat, physical therapy including strengthening and stretching exercises, home exercises, ROM, aerobic conditioning, aqua  therapy, other modalities including ultrasound, massage, and dry needling, epidural steroid injections and finally surgical intervention.      Pt presents with chronic low back pain and chronic right shoulder pain. Will send PT orders to [facility] and mobic and robaxin to pharmacy. Pt will fu if pain persists.

## 2023-04-27 ENCOUNTER — HOSPITAL ENCOUNTER (OUTPATIENT)
Dept: RADIOLOGY | Facility: HOSPITAL | Age: 43
Discharge: HOME | End: 2023-04-27
Attending: ORTHOPAEDIC SURGERY
Payer: COMMERCIAL

## 2023-04-27 ENCOUNTER — OFFICE VISIT (OUTPATIENT)
Dept: ORTHOPEDICS | Facility: CLINIC | Age: 43
End: 2023-04-27
Payer: COMMERCIAL

## 2023-04-27 VITALS — HEIGHT: 70 IN | BODY MASS INDEX: 29.38 KG/M2 | WEIGHT: 205.25 LBS

## 2023-04-27 DIAGNOSIS — M50.30 DDD (DEGENERATIVE DISC DISEASE), CERVICAL: ICD-10-CM

## 2023-04-27 DIAGNOSIS — M51.36 DDD (DEGENERATIVE DISC DISEASE), LUMBAR: Primary | ICD-10-CM

## 2023-04-27 DIAGNOSIS — M51.36 DDD (DEGENERATIVE DISC DISEASE), LUMBAR: ICD-10-CM

## 2023-04-27 PROCEDURE — 72110 XR LUMBAR SPINE AP AND LAT WITH FLEX/EXT: ICD-10-PCS | Mod: 26,,, | Performed by: RADIOLOGY

## 2023-04-27 PROCEDURE — 72110 X-RAY EXAM L-2 SPINE 4/>VWS: CPT | Mod: TC

## 2023-04-27 PROCEDURE — 72050 XR CERVICAL SPINE AP LAT WITH FLEX EXTEN: ICD-10-PCS | Mod: 26,,, | Performed by: RADIOLOGY

## 2023-04-27 PROCEDURE — 3008F PR BODY MASS INDEX (BMI) DOCUMENTED: ICD-10-PCS | Mod: CPTII,S$GLB,, | Performed by: ORTHOPAEDIC SURGERY

## 2023-04-27 PROCEDURE — 99204 OFFICE O/P NEW MOD 45 MIN: CPT | Mod: S$GLB,,, | Performed by: ORTHOPAEDIC SURGERY

## 2023-04-27 PROCEDURE — 1159F MED LIST DOCD IN RCRD: CPT | Mod: CPTII,S$GLB,, | Performed by: ORTHOPAEDIC SURGERY

## 2023-04-27 PROCEDURE — 72110 X-RAY EXAM L-2 SPINE 4/>VWS: CPT | Mod: 26,,, | Performed by: RADIOLOGY

## 2023-04-27 PROCEDURE — 99204 PR OFFICE/OUTPT VISIT, NEW, LEVL IV, 45-59 MIN: ICD-10-PCS | Mod: S$GLB,,, | Performed by: ORTHOPAEDIC SURGERY

## 2023-04-27 PROCEDURE — 72050 X-RAY EXAM NECK SPINE 4/5VWS: CPT | Mod: TC

## 2023-04-27 PROCEDURE — 1159F PR MEDICATION LIST DOCUMENTED IN MEDICAL RECORD: ICD-10-PCS | Mod: CPTII,S$GLB,, | Performed by: ORTHOPAEDIC SURGERY

## 2023-04-27 PROCEDURE — 99999 PR PBB SHADOW E&M-EST. PATIENT-LVL III: ICD-10-PCS | Mod: PBBFAC,,, | Performed by: ORTHOPAEDIC SURGERY

## 2023-04-27 PROCEDURE — 99999 PR PBB SHADOW E&M-EST. PATIENT-LVL III: CPT | Mod: PBBFAC,,, | Performed by: ORTHOPAEDIC SURGERY

## 2023-04-27 PROCEDURE — 72050 X-RAY EXAM NECK SPINE 4/5VWS: CPT | Mod: 26,,, | Performed by: RADIOLOGY

## 2023-04-27 PROCEDURE — 3008F BODY MASS INDEX DOCD: CPT | Mod: CPTII,S$GLB,, | Performed by: ORTHOPAEDIC SURGERY

## 2023-04-27 RX ORDER — MELOXICAM 7.5 MG/1
7.5 TABLET ORAL DAILY
Qty: 30 TABLET | Refills: 0 | Status: ON HOLD | OUTPATIENT
Start: 2023-04-27 | End: 2023-05-24 | Stop reason: HOSPADM

## 2023-04-27 RX ORDER — METHOCARBAMOL 750 MG/1
750 TABLET, FILM COATED ORAL NIGHTLY PRN
Qty: 30 TABLET | Refills: 0 | Status: ON HOLD | OUTPATIENT
Start: 2023-04-27 | End: 2023-05-24 | Stop reason: HOSPADM

## 2023-04-27 RX ORDER — SERTRALINE HYDROCHLORIDE 100 MG/1
100 TABLET, FILM COATED ORAL DAILY
COMMUNITY
Start: 2023-04-27 | End: 2023-11-09

## 2023-05-12 ENCOUNTER — CLINICAL SUPPORT (OUTPATIENT)
Dept: REHABILITATION | Facility: HOSPITAL | Age: 43
End: 2023-05-12
Payer: COMMERCIAL

## 2023-05-12 DIAGNOSIS — M51.36 DDD (DEGENERATIVE DISC DISEASE), LUMBAR: Primary | ICD-10-CM

## 2023-05-12 PROBLEM — M51.369 DDD (DEGENERATIVE DISC DISEASE), LUMBAR: Status: ACTIVE | Noted: 2023-05-12
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
## 2023-05-18 ENCOUNTER — HOSPITAL ENCOUNTER (INPATIENT)
Facility: HOSPITAL | Age: 43
LOS: 6 days | Discharge: HOME | DRG: 097 | End: 2023-05-24
Attending: EMERGENCY MEDICINE | Admitting: INTERNAL MEDICINE
Payer: COMMERCIAL

## 2023-05-18 DIAGNOSIS — R07.9 CHEST PAIN: ICD-10-CM

## 2023-05-18 DIAGNOSIS — G04.90 ENCEPHALITIS: Primary | ICD-10-CM

## 2023-05-18 DIAGNOSIS — R56.9 SEIZURE: ICD-10-CM

## 2023-05-18 DIAGNOSIS — F41.9 ANXIETY: ICD-10-CM

## 2023-05-18 DIAGNOSIS — M54.50 CHRONIC BILATERAL LOW BACK PAIN WITHOUT SCIATICA: ICD-10-CM

## 2023-05-18 DIAGNOSIS — G89.29 CHRONIC BILATERAL LOW BACK PAIN WITHOUT SCIATICA: ICD-10-CM

## 2023-05-18 PROBLEM — E87.20 METABOLIC ACIDOSIS: Status: ACTIVE | Noted: 2023-05-18

## 2023-05-18 PROBLEM — E66.9 CLASS 1 OBESITY IN ADULT: Status: ACTIVE | Noted: 2023-05-18

## 2023-05-18 PROBLEM — E66.811 CLASS 1 OBESITY IN ADULT: Status: ACTIVE | Noted: 2023-05-18

## 2023-05-18 PROBLEM — G93.41 ENCEPHALOPATHY, METABOLIC: Status: ACTIVE | Noted: 2023-05-18

## 2023-05-18 LAB
ALBUMIN SERPL BCP-MCNC: 4.8 G/DL (ref 3.5–5.2)
ALLENS TEST: ABNORMAL
ALP SERPL-CCNC: 95 U/L (ref 55–135)
ALT SERPL W/O P-5'-P-CCNC: 19 U/L (ref 10–44)
AMPHET+METHAMPHET UR QL: NEGATIVE
ANION GAP SERPL CALC-SCNC: 16 MMOL/L (ref 8–16)
AST SERPL-CCNC: 20 U/L (ref 10–40)
BACTERIA #/AREA URNS AUTO: NORMAL /HPF
BARBITURATES UR QL SCN>200 NG/ML: NEGATIVE
BASOPHILS # BLD AUTO: 0.04 K/UL (ref 0–0.2)
BASOPHILS NFR BLD: 0.2 % (ref 0–1.9)
BENZODIAZ UR QL SCN>200 NG/ML: NEGATIVE
BILIRUB SERPL-MCNC: 0.3 MG/DL (ref 0.1–1)
BILIRUB UR QL STRIP: NEGATIVE
BUN SERPL-MCNC: 13 MG/DL (ref 6–20)
BZE UR QL SCN: NEGATIVE
C GATTII+NEOFOR DNA CSF QL NAA+NON-PROBE: NOT DETECTED
CALCIUM SERPL-MCNC: 9.8 MG/DL (ref 8.7–10.5)
CANNABINOIDS UR QL SCN: ABNORMAL
CHLORIDE SERPL-SCNC: 111 MMOL/L (ref 95–110)
CLARITY CSF: CLEAR
CLARITY UR REFRACT.AUTO: CLEAR
CMV DNA CSF QL NAA+NON-PROBE: NOT DETECTED
CO2 SERPL-SCNC: 18 MMOL/L (ref 23–29)
COLOR CSF: COLORLESS
COLOR UR AUTO: COLORLESS
CREAT SERPL-MCNC: 1.3 MG/DL (ref 0.5–1.4)
CREAT UR-MCNC: 63 MG/DL (ref 23–375)
CSF TUBE NUMBER: 1
CSF TUBE NUMBER: 1
DIFFERENTIAL METHOD: ABNORMAL
E COLI K1 DNA CSF QL NAA+NON-PROBE: NOT DETECTED
EOSINOPHIL # BLD AUTO: 0 K/UL (ref 0–0.5)
EOSINOPHIL NFR BLD: 0 % (ref 0–8)
ERYTHROCYTE [DISTWIDTH] IN BLOOD BY AUTOMATED COUNT: 14.5 % (ref 11.5–14.5)
EST. GFR  (NO RACE VARIABLE): >60 ML/MIN/1.73 M^2
ETHANOL SERPL-MCNC: <10 MG/DL
ETHANOL UR-MCNC: <10 MG/DL
EV RNA CSF QL NAA+NON-PROBE: NOT DETECTED
GLUCOSE CSF-MCNC: 93 MG/DL (ref 40–70)
GLUCOSE SERPL-MCNC: 147 MG/DL (ref 70–110)
GLUCOSE UR QL STRIP: NEGATIVE
GP B STREP DNA CSF QL NAA+NON-PROBE: NOT DETECTED
HAEM INFLU DNA CSF QL NAA+NON-PROBE: NOT DETECTED
HAEM INFLU DNA CSF QL NAA+NON-PROBE: NOT DETECTED
HCO3 UR-SCNC: 20 MMOL/L (ref 24–28)
HCT VFR BLD AUTO: 45.9 % (ref 40–54)
HGB BLD-MCNC: 14.7 G/DL (ref 14–18)
HGB UR QL STRIP: ABNORMAL
HHV6 DNA CSF QL NAA+NON-PROBE: NOT DETECTED
HIV 1+2 AB+HIV1 P24 AG SERPL QL IA: NORMAL
HSV1 DNA CSF QL NAA+NON-PROBE: NOT DETECTED
HSV2 DNA CSF QL NAA+NON-PROBE: NOT DETECTED
HYALINE CASTS UR QL AUTO: 0 /LPF
IMM GRANULOCYTES # BLD AUTO: 0.14 K/UL (ref 0–0.04)
IMM GRANULOCYTES NFR BLD AUTO: 0.5 % (ref 0–0.5)
INDIA INK PREP CSF: NORMAL
KETONES UR QL STRIP: ABNORMAL
LACTATE SERPL-SCNC: 2.3 MMOL/L (ref 0.5–2.2)
LACTATE SERPL-SCNC: 6 MMOL/L (ref 0.5–2.2)
LEUKOCYTE ESTERASE UR QL STRIP: NEGATIVE
LYMPHOCYTES # BLD AUTO: 0.6 K/UL (ref 1–4.8)
LYMPHOCYTES NFR BLD: 2.3 % (ref 18–48)
LYMPHOCYTES NFR CSF MANUAL: 40 % (ref 40–80)
MCH RBC QN AUTO: 24.7 PG (ref 27–31)
MCHC RBC AUTO-ENTMCNC: 32 G/DL (ref 32–36)
MCV RBC AUTO: 77 FL (ref 82–98)
METHADONE UR QL SCN>300 NG/ML: NEGATIVE
MICROSCOPIC COMMENT: NORMAL
MONOCYTES # BLD AUTO: 0.9 K/UL (ref 0.3–1)
MONOCYTES NFR BLD: 3.4 % (ref 4–15)
MONOS+MACROS NFR CSF MANUAL: 30 % (ref 15–45)
N MEN DNA CSF QL NAA+NON-PROBE: NOT DETECTED
NEUTROPHILS # BLD AUTO: 23.9 K/UL (ref 1.8–7.7)
NEUTROPHILS NFR BLD: 93.6 % (ref 38–73)
NEUTROPHILS NFR CSF MANUAL: 30 % (ref 0–6)
NITRITE UR QL STRIP: NEGATIVE
NRBC BLD-RTO: 0 /100 WBC
OPIATES UR QL SCN: NEGATIVE
PARECHOVIRUS A RNA CSF QL NAA+NON-PROBE: NOT DETECTED
PCO2 BLDA: 35.3 MMHG (ref 35–45)
PCP UR QL SCN>25 NG/ML: NEGATIVE
PH SMN: 7.36 [PH] (ref 7.35–7.45)
PH UR STRIP: 7 [PH] (ref 5–8)
PLATELET # BLD AUTO: 327 K/UL (ref 150–450)
PLATELET BLD QL SMEAR: ABNORMAL
PMV BLD AUTO: 11.3 FL (ref 9.2–12.9)
PO2 BLDA: 42 MMHG (ref 40–60)
POC BE: -5 MMOL/L
POC SATURATED O2: 77 % (ref 95–100)
POC TCO2: 21 MMOL/L (ref 24–29)
POTASSIUM SERPL-SCNC: 3.8 MMOL/L (ref 3.5–5.1)
PROCALCITONIN SERPL IA-MCNC: 0.12 NG/ML
PROT CSF-MCNC: 45 MG/DL (ref 15–40)
PROT SERPL-MCNC: 8.1 G/DL (ref 6–8.4)
PROT UR QL STRIP: ABNORMAL
RBC # BLD AUTO: 5.95 M/UL (ref 4.6–6.2)
RBC # CSF: 1 /CU MM
RBC #/AREA URNS AUTO: 2 /HPF (ref 0–4)
S PNEUM DNA CSF QL NAA+NON-PROBE: NOT DETECTED
SAMPLE: ABNORMAL
SITE: ABNORMAL
SODIUM SERPL-SCNC: 145 MMOL/L (ref 136–145)
SP GR UR STRIP: 1.01 (ref 1–1.03)
SPECIMEN VOL CSF: 2 ML
TOXICOLOGY INFORMATION: ABNORMAL
URN SPEC COLLECT METH UR: ABNORMAL
VZV DNA CSF QL NAA+NON-PROBE: NOT DETECTED
WBC # BLD AUTO: 25.57 K/UL (ref 3.9–12.7)
WBC # CSF: 1 /CU MM (ref 0–5)
WBC #/AREA URNS AUTO: 1 /HPF (ref 0–5)

## 2023-05-18 PROCEDURE — 80307 DRUG TEST PRSMV CHEM ANLYZR: CPT

## 2023-05-18 PROCEDURE — 11000001 HC ACUTE MED/SURG PRIVATE ROOM

## 2023-05-18 PROCEDURE — 99223 PR INITIAL HOSPITAL CARE,LEVL III: ICD-10-PCS | Mod: ,,, | Performed by: INTERNAL MEDICINE

## 2023-05-18 PROCEDURE — 99291 CRITICAL CARE FIRST HOUR: CPT | Mod: GC,,, | Performed by: EMERGENCY MEDICINE

## 2023-05-18 PROCEDURE — 83605 ASSAY OF LACTIC ACID: CPT

## 2023-05-18 PROCEDURE — 63600175 PHARM REV CODE 636 W HCPCS: Performed by: STUDENT IN AN ORGANIZED HEALTH CARE EDUCATION/TRAINING PROGRAM

## 2023-05-18 PROCEDURE — 99900035 HC TECH TIME PER 15 MIN (STAT)

## 2023-05-18 PROCEDURE — 63600175 PHARM REV CODE 636 W HCPCS

## 2023-05-18 PROCEDURE — 87070 CULTURE OTHR SPECIMN AEROBIC: CPT | Performed by: STUDENT IN AN ORGANIZED HEALTH CARE EDUCATION/TRAINING PROGRAM

## 2023-05-18 PROCEDURE — 93010 EKG 12-LEAD: ICD-10-PCS | Mod: ,,, | Performed by: INTERNAL MEDICINE

## 2023-05-18 PROCEDURE — 25000003 PHARM REV CODE 250

## 2023-05-18 PROCEDURE — 99000 SPECIMEN HANDLING OFFICE-LAB: CPT | Performed by: STUDENT IN AN ORGANIZED HEALTH CARE EDUCATION/TRAINING PROGRAM

## 2023-05-18 PROCEDURE — 89051 BODY FLUID CELL COUNT: CPT | Performed by: STUDENT IN AN ORGANIZED HEALTH CARE EDUCATION/TRAINING PROGRAM

## 2023-05-18 PROCEDURE — 99223 1ST HOSP IP/OBS HIGH 75: CPT | Mod: ,,, | Performed by: INTERNAL MEDICINE

## 2023-05-18 PROCEDURE — 87102 FUNGUS ISOLATION CULTURE: CPT | Performed by: STUDENT IN AN ORGANIZED HEALTH CARE EDUCATION/TRAINING PROGRAM

## 2023-05-18 PROCEDURE — 87389 HIV-1 AG W/HIV-1&-2 AB AG IA: CPT

## 2023-05-18 PROCEDURE — 87529 HSV DNA AMP PROBE: CPT | Performed by: STUDENT IN AN ORGANIZED HEALTH CARE EDUCATION/TRAINING PROGRAM

## 2023-05-18 PROCEDURE — 96374 THER/PROPH/DIAG INJ IV PUSH: CPT

## 2023-05-18 PROCEDURE — 99291 CRITICAL CARE FIRST HOUR: CPT

## 2023-05-18 PROCEDURE — 83605 ASSAY OF LACTIC ACID: CPT | Mod: 91

## 2023-05-18 PROCEDURE — 85025 COMPLETE CBC W/AUTO DIFF WBC: CPT

## 2023-05-18 PROCEDURE — 87040 BLOOD CULTURE FOR BACTERIA: CPT | Mod: 59

## 2023-05-18 PROCEDURE — 84157 ASSAY OF PROTEIN OTHER: CPT | Performed by: STUDENT IN AN ORGANIZED HEALTH CARE EDUCATION/TRAINING PROGRAM

## 2023-05-18 PROCEDURE — 93010 ELECTROCARDIOGRAM REPORT: CPT | Mod: ,,, | Performed by: INTERNAL MEDICINE

## 2023-05-18 PROCEDURE — 96375 TX/PRO/DX INJ NEW DRUG ADDON: CPT

## 2023-05-18 PROCEDURE — 87498 ENTEROVIRUS PROBE&REVRS TRNS: CPT | Performed by: STUDENT IN AN ORGANIZED HEALTH CARE EDUCATION/TRAINING PROGRAM

## 2023-05-18 PROCEDURE — 87483 CNS DNA AMP PROBE TYPE 12-25: CPT | Performed by: STUDENT IN AN ORGANIZED HEALTH CARE EDUCATION/TRAINING PROGRAM

## 2023-05-18 PROCEDURE — 80177 DRUG SCRN QUAN LEVETIRACETAM: CPT

## 2023-05-18 PROCEDURE — 99291 PR CRITICAL CARE, E/M 30-74 MINUTES: ICD-10-PCS | Mod: GC,,, | Performed by: EMERGENCY MEDICINE

## 2023-05-18 PROCEDURE — 82945 GLUCOSE OTHER FLUID: CPT | Performed by: STUDENT IN AN ORGANIZED HEALTH CARE EDUCATION/TRAINING PROGRAM

## 2023-05-18 PROCEDURE — 82803 BLOOD GASES ANY COMBINATION: CPT

## 2023-05-18 PROCEDURE — 27000221 HC OXYGEN, UP TO 24 HOURS

## 2023-05-18 PROCEDURE — 87210 SMEAR WET MOUNT SALINE/INK: CPT | Performed by: STUDENT IN AN ORGANIZED HEALTH CARE EDUCATION/TRAINING PROGRAM

## 2023-05-18 PROCEDURE — 87205 SMEAR GRAM STAIN: CPT | Performed by: STUDENT IN AN ORGANIZED HEALTH CARE EDUCATION/TRAINING PROGRAM

## 2023-05-18 PROCEDURE — 86592 SYPHILIS TEST NON-TREP QUAL: CPT | Performed by: STUDENT IN AN ORGANIZED HEALTH CARE EDUCATION/TRAINING PROGRAM

## 2023-05-18 PROCEDURE — 81001 URINALYSIS AUTO W/SCOPE: CPT

## 2023-05-18 PROCEDURE — 80053 COMPREHEN METABOLIC PANEL: CPT

## 2023-05-18 PROCEDURE — 82077 ASSAY SPEC XCP UR&BREATH IA: CPT

## 2023-05-18 PROCEDURE — 93005 ELECTROCARDIOGRAM TRACING: CPT

## 2023-05-18 PROCEDURE — 84145 PROCALCITONIN (PCT): CPT

## 2023-05-18 RX ORDER — SODIUM CHLORIDE 0.9 % (FLUSH) 0.9 %
10 SYRINGE (ML) INJECTION EVERY 12 HOURS PRN
Status: DISCONTINUED | OUTPATIENT
Start: 2023-05-18 | End: 2023-05-24 | Stop reason: HOSPADM

## 2023-05-18 RX ORDER — ONDANSETRON 2 MG/ML
4 INJECTION INTRAMUSCULAR; INTRAVENOUS EVERY 6 HOURS PRN
Status: CANCELLED | OUTPATIENT
Start: 2023-05-18

## 2023-05-18 RX ORDER — ENOXAPARIN SODIUM 100 MG/ML
40 INJECTION SUBCUTANEOUS EVERY 24 HOURS
Status: DISCONTINUED | OUTPATIENT
Start: 2023-05-18 | End: 2023-05-24

## 2023-05-18 RX ORDER — ONDANSETRON 4 MG/1
4 TABLET, FILM COATED ORAL EVERY 6 HOURS PRN
Status: DISCONTINUED | OUTPATIENT
Start: 2023-05-18 | End: 2023-05-24 | Stop reason: HOSPADM

## 2023-05-18 RX ORDER — ACETAMINOPHEN 325 MG/1
650 TABLET ORAL EVERY 4 HOURS PRN
Status: DISCONTINUED | OUTPATIENT
Start: 2023-05-18 | End: 2023-05-18

## 2023-05-18 RX ORDER — LEVETIRACETAM 500 MG/1
1000 TABLET ORAL EVERY 12 HOURS
Status: DISCONTINUED | OUTPATIENT
Start: 2023-05-18 | End: 2023-05-20

## 2023-05-18 RX ORDER — GLUCAGON 1 MG
1 KIT INJECTION
Status: DISCONTINUED | OUTPATIENT
Start: 2023-05-18 | End: 2023-05-24 | Stop reason: HOSPADM

## 2023-05-18 RX ORDER — NALOXONE HCL 0.4 MG/ML
0.02 VIAL (ML) INJECTION
Status: DISCONTINUED | OUTPATIENT
Start: 2023-05-18 | End: 2023-05-24 | Stop reason: HOSPADM

## 2023-05-18 RX ORDER — MIDAZOLAM HYDROCHLORIDE 1 MG/ML
1 INJECTION INTRAMUSCULAR; INTRAVENOUS
Status: COMPLETED | OUTPATIENT
Start: 2023-05-18 | End: 2023-05-18

## 2023-05-18 RX ORDER — MULTIVITAMIN
1 TABLET ORAL DAILY
COMMUNITY

## 2023-05-18 RX ORDER — IBUPROFEN 200 MG
24 TABLET ORAL
Status: DISCONTINUED | OUTPATIENT
Start: 2023-05-18 | End: 2023-05-23

## 2023-05-18 RX ORDER — ACETAMINOPHEN 500 MG
1000 TABLET ORAL EVERY 6 HOURS PRN
Status: DISCONTINUED | OUTPATIENT
Start: 2023-05-19 | End: 2023-05-24 | Stop reason: HOSPADM

## 2023-05-18 RX ORDER — IBUPROFEN 200 MG
400-800 TABLET ORAL 2 TIMES DAILY PRN
COMMUNITY

## 2023-05-18 RX ORDER — IBUPROFEN 200 MG
16 TABLET ORAL
Status: DISCONTINUED | OUTPATIENT
Start: 2023-05-18 | End: 2023-05-23

## 2023-05-18 RX ORDER — LEVETIRACETAM 500 MG/5ML
2000 INJECTION, SOLUTION, CONCENTRATE INTRAVENOUS
Status: COMPLETED | OUTPATIENT
Start: 2023-05-18 | End: 2023-05-18

## 2023-05-18 RX ORDER — OMEPRAZOLE 20 MG/1
20 TABLET, DELAYED RELEASE ORAL DAILY PRN
COMMUNITY

## 2023-05-18 RX ADMIN — LEVETIRACETAM 2000 MG: 100 INJECTION, SOLUTION INTRAVENOUS at 02:05

## 2023-05-18 RX ADMIN — SODIUM CHLORIDE 1000 ML: 9 INJECTION, SOLUTION INTRAVENOUS at 04:05

## 2023-05-18 RX ADMIN — CEFTRIAXONE 2 G: 2 INJECTION, POWDER, FOR SOLUTION INTRAMUSCULAR; INTRAVENOUS at 04:05

## 2023-05-18 RX ADMIN — ONDANSETRON 4 MG: 4 TABLET ORAL at 10:05

## 2023-05-18 RX ADMIN — ACETAMINOPHEN 650 MG: 325 TABLET ORAL at 10:05

## 2023-05-18 RX ADMIN — MIDAZOLAM HYDROCHLORIDE 1 MG: 2 INJECTION, SOLUTION INTRAMUSCULAR; INTRAVENOUS at 04:05

## 2023-05-18 RX ADMIN — ACYCLOVIR SODIUM 730 MG: 1000 INJECTION, SOLUTION INTRAVENOUS at 05:05

## 2023-05-18 RX ADMIN — LEVETIRACETAM 1000 MG: 500 TABLET, FILM COATED ORAL at 10:05

## 2023-05-18 RX ADMIN — ENOXAPARIN SODIUM 40 MG: 40 INJECTION SUBCUTANEOUS at 06:05

## 2023-05-18 NOTE — ED NOTES
Blood culture collection ordered. Verified with MD (Dr. Wilkes) that collection is wanted due to antibiotics administered prior to draw

## 2023-05-18 NOTE — ED PROVIDER NOTES
"Encounter Date: 5/18/2023       History     Chief Complaint   Patient presents with    Seizures     Unwitnessed seizure this morning by wife, wife went to work and came back home around 12:45pm, pt was "worse". Pupill unequal, L 4mm and sluggish, right 3mm brisk, equal, GCS 13, retrograde amnesia, unable to identify objects, possibly post ictal, "very bad headache, LKN last night before bed       Patient is a 42-year-old male history of seizures on Keppra who presents to the emergency department via EMS due to altered mental status, concern for prolonged infusion with seizure-like activity.  Upon arrival patient is confused unable to provide history initial history provided by EMS.  They report that they were called and upon arrival patient was GCS 13 unable to identify objects confused with memory loss.  They also reported asymmetric pupils and patient was complaining of headache.  Wife is at bedside and reports that this morning patient seemed to be confused and assumed he may have been having a panic attack.  She reports that she went to work and was receiving text messages which did not make sense and went to check up on patient.  She reports that when she arrived it appeared that patient had a seizure foaming at the mouth with rhythmic body movements.  He then continued to be confused and not at his baseline in which she called the ambulance.  Patient is able to answer questions reports that he takes his Keppra once today and has not missed a single dose.  He denies any recent fevers, chest pain, shortness of breath.  However when asked about events this morning patient is unable to recall and has to be consistently reminded of the events surrounding today.    The history is provided by a relative and the EMS personnel.   Review of patient's allergies indicates:  No Known Allergies  History reviewed. No pertinent past medical history.  History reviewed. No pertinent surgical history.  Family History   Problem " Relation Age of Onset    Mental illness Mother     Heart disease Father     Mental illness Father     Heart disease Brother     Cancer Maternal Grandfather     Cancer Paternal Grandfather      Social History     Tobacco Use    Smoking status: Never    Smokeless tobacco: Never   Substance Use Topics    Drug use: No       Physical Exam     Initial Vitals [05/18/23 1334]   BP Pulse Resp Temp SpO2   (!) 166/83 (!) 113 18 97.8 °F (36.6 °C) 98 %      MAP       --         Physical Exam    Nursing note and vitals reviewed.  Constitutional: He appears well-developed and well-nourished.   HENT:   Head: Normocephalic and atraumatic.   Eyes: EOM are normal. Pupils are equal, round, and reactive to light.   Neck: Neck supple.   Normal range of motion.  Cardiovascular:  Normal rate and regular rhythm.           Pulmonary/Chest: Breath sounds normal. No respiratory distress. He has no wheezes.   Abdominal: He exhibits no distension. There is no abdominal tenderness.   Musculoskeletal:         General: No tenderness or edema. Normal range of motion.      Cervical back: Normal range of motion and neck supple.      Comments: No neck stiffness noted on exam     Neurological: He is alert and oriented to person, place, and time.   Confused speech     Skin: Skin is warm and dry.       ED Course   Procedures  Labs Reviewed   CBC W/ AUTO DIFFERENTIAL - Abnormal; Notable for the following components:       Result Value    WBC 25.57 (*)     MCV 77 (*)     MCH 24.7 (*)     Gran # (ANC) 23.9 (*)     Immature Grans (Abs) 0.14 (*)     Lymph # 0.6 (*)     Gran % 93.6 (*)     Lymph % 2.3 (*)     Mono % 3.4 (*)     All other components within normal limits   COMPREHENSIVE METABOLIC PANEL - Abnormal; Notable for the following components:    Chloride 111 (*)     CO2 18 (*)     Glucose 147 (*)     All other components within normal limits   URINALYSIS, REFLEX TO URINE CULTURE - Abnormal; Notable for the following components:    Color, UA Colorless (*)      Protein, UA 1+ (*)     Ketones, UA Trace (*)     Occult Blood UA 2+ (*)     All other components within normal limits    Narrative:     Specimen Source->Urine   LACTIC ACID, PLASMA - Abnormal; Notable for the following components:    Lactate (Lactic Acid) 6.0 (*)     All other components within normal limits   ISTAT PROCEDURE - Abnormal; Notable for the following components:    POC HCO3 20.0 (*)     POC SATURATED O2 77 (*)     POC TCO2 21 (*)     All other components within normal limits   ALCOHOL,MEDICAL (ETHANOL)   URINALYSIS MICROSCOPIC    Narrative:     Specimen Source->Urine   LEVETIRACETAM  (KEPPRA) LEVEL          Imaging Results              CT Head Without Contrast (Final result)  Result time 05/18/23 14:27:35      Final result by Irineo Mobley MD (05/18/23 14:27:35)                   Impression:      No evidence of acute intracranial pathology.    Electronically signed by resident: Boubacar Espinosa  Date:    05/18/2023  Time:    14:03    Electronically signed by: Irineo Mobley MD  Date:    05/18/2023  Time:    14:27               Narrative:    EXAMINATION:  CT HEAD WITHOUT CONTRAST    CLINICAL HISTORY:  Headache, sudden, severe;    TECHNIQUE:  Low dose axial CT images obtained throughout the head without the use of intravenous contrast.  Axial, sagittal and coronal reconstructions were performed.    COMPARISON:  None.    FINDINGS:  Intracranial compartment:    Ventricles and sulci are normal in size for age without evidence of hydrocephalus.    The brain parenchyma appears within normal limits.  No parenchymal  hemorrhage, edema, mass effect or major vascular distribution infarct.    No extra-axial blood or fluid collections.    Skull/extracranial contents (limited evaluation):    No displaced calvarial fracture.    The mastoid air cells and visualized paranasal sinuses are essentially clear.                                       Medications   acyclovir (ZOVIRAX) 730 mg in dextrose 5 % (D5W) 250  mL IVPB (has no administration in time range)   cefTRIAXone (ROCEPHIN) 2 g in dextrose 5 % in water (D5W) 5 % 50 mL IVPB (MB+) (has no administration in time range)   sodium chloride 0.9% bolus 1,000 mL 1,000 mL (has no administration in time range)   levETIRAcetam injection 2,000 mg (2,000 mg Intravenous Given 5/18/23 1416)     Medical Decision Making:   Initial Assessment:   42-year-old male presenting to the emergency department for altered mental status with confusion and amnesia with seizure-like activity.  A time assessment patient is alert and oriented x3 but has to be repeatedly redirected.  Patient is afebrile vitals within normal limits.  Physical exam findings noted above.  Differential Diagnosis:   Seizure  Meningitis  Global amnesia  Intracranial hemorrhage  Clinical Tests:   Lab Tests: Reviewed and Ordered  Radiological Study: Reviewed and Ordered  Medical Tests: Ordered and Reviewed  ED Management:  42-year-old male presenting via EMS for prolonged confused state after suspected seizure.  Upon arrival patient is having mild speech difficulty with reported headache.  CT head obtained to evaluate for acute intracranial processes.  CT head displayed no signs of intracranial hemorrhage or acute processes at this time.  Patient Keppra loaded 2 g, and CBC, CMP obtained evaluate for leukocytosis, anemia, metabolic derangements, renal impairment.  Lactic acid obtained and was positive at 6.0.  Patient started on IV fluids.  Labs also notable for a leukocytosis of 25.  The patient has a history of seizure in the setting of an elevated leukocytosis with prolonged confused state concern for meningitis.  At this time plan to start acyclovir and ceftriaxone with LP pending.  At this time patient care handed over to oncoming care team pending lumbar puncture and results with plan for admission.           ED Course as of 05/18/23 1539   Thu May 18, 2023   1503 Patient signed out to me pending LP and admit for  meningitis/ prolonged postictal state [OO]      ED Course User Index  [OO] Nancy Hamlin MD                 Clinical Impression:   Final diagnoses:  [R56.9] Seizure               Paty Brown MD  Resident  05/18/23 3822

## 2023-05-18 NOTE — HPI
"Patient is a 41 yo male with a PMH of anxiety, GERD, and seizure disorder on Keppra who presents to [facility] with altered mental status per wife since waking up the morning of admission. She reports that he has had confusion, retrograde amnesia, and abnormal behavior since this morning. She left for work and received odd messages from him that did not make since so she returned home and found that his confusion and amnesia were worse and he was frothing from the mouth. She states that he was out drinking at a bar with a friend the night before, something he commonly dose, and came back around midnight and seemed in usual state of health. She asked the friend today and friend states there was nothing unusual about their outing. He has a history of tonic clonic seizures that he has been free from for the past several months although last year had to increase the dose of his keppra for breakthrough seizures. His seizures are typically preceded by a prodrome that the patient can feel coming on and followed by a short post ictal state that may involve some confusion, although wife states this current episode is nothing like seizures he has had in the past. He takes his seizure medication daily but often not twice a day as prescribed. Patient is alert with very poor short term memory. He does expresses recent headaches that he is unable to say are different or the same as headaches that he often gets and pain in his neck, back, and hips that is acute and chronic. He repeatedly asks the same questions such as "Why am I here?". He had an episode of vomiting earlier in the morning. He denies chest pain, shortness of breath, vision changes, or dizziness. He denies sick contacts, any recent travel, recent trauma, and they do not own pets.     He has no smoking history, he does admit to occasional binge drinking of around maybe 8 drinks every once in a while and admits to occasional marijuana use last used 2 weeks ago but no other " illicit drug use. He lives with his wife and their young child who have had no abnormal symptoms.    ED course:Vitals stable with high BP and pulse. WBC 25.5. Lactate 6.0. Neurology consulted and LP performed for concern of possible meningitis. CT head unremarkable.

## 2023-05-18 NOTE — ASSESSMENT & PLAN NOTE
Patient admitted with seizure like activity with prolonged post ictal period vs concern for menengitis as seizure is unlike previous episodes he has had before.Wife states he is inconsistent with  Vitals stable. Physical exam relatively benign. WBC 25.5. Lactate 6.0. Lumbar puncture performed and initial labs positive for slightly elevated glucose and protein but normal WBC. CT unremarkable.      LP studies relatively unremarkable for infection but with positive RBC.    Plan:   -Continuing keppra 1g BID  -Started on ceftriaxone and acyclovir  -Neurology consulted, recs as below    - Continue Atbs   - Continue Keppra 1 g BID   - Once dc, can follow up with us of [facility]   - No EEG at this time    - No MRI brain at this time since he has stabilized and he recently had an LP done (will bias results of MRI)    - Since at this time there is suspicion for meningitis, depending on LP results, we would recommend ID to be involved in case. We will let Primary care take over since no different recommendations at this time.     - If LP results not remarkable and the patient keeps having these events or if new neuro changes happen, please call us back    - Neurology will sign off. Please call neurology if any questions or concerns.     - Checking UDS   - Seizure precautions in place   - F/u CSF and blood cultures  -Checking xanthochromia for positive RBCs in LP

## 2023-05-18 NOTE — PHARMACY MED REC
"Admission Medication History     The home medication history was taken by Maria De Jesus Huynh.    You may go to "Admission" then "Reconcile Home Medications" tabs to review and/or act upon these items.     The home medication list has been updated by the Pharmacy department.   Please read ALL comments highlighted in yellow.   Please address this information as you see fit.    Feel free to contact us if you have any questions or require assistance.      The medications listed below were removed from the home medication list. Please reorder if appropriate:  Patient reports no longer taking the following medication(s):  PANTOPRAZOLE 40 MG    Medications listed below were obtained from: Patient/family  Current Outpatient Medications on File Prior to Encounter   Medication Sig    guaifenesin (MUCINEX ORAL) Take 1 tablet by mouth 2 (two) times daily as needed (allergies).    ibuprofen (ADVIL,MOTRIN) 200 MG tablet Take 400-800 mg by mouth 2 (two) times daily as needed for Pain.    levETIRAcetam (KEPPRA) 1000 MG tablet Take 1,000 mg by mouth once daily.    multivitamin (THERAGRAN) per tablet Take 1 tablet by mouth once daily.    omeprazole (PRILOSEC OTC) 20 MG tablet Take 20 mg by mouth daily as needed (heartburn).    sertraline (ZOLOFT) 100 MG tablet Take 100 mg by mouth once daily.         Potential issues to be addressed PRIOR TO DISCHARGE    Please discuss with the patient barriers to adherence with medication treatment plans    Patient requires education regarding drug therapies       Maria De Jesus Huynh  EXT [phone]                  .        "

## 2023-05-18 NOTE — ASSESSMENT & PLAN NOTE
Body mass index is 31.57 kg/m². Morbid obesity complicates all aspects of disease management from diagnostic modalities to treatment. Weight loss encouraged and health benefits explained to patient.

## 2023-05-18 NOTE — PLAN OF CARE
Inpatient consult to Neurology  Consult performed by: Te Garcia MD  Consult ordered by: Paty Brown MD  Reason for consult: seziures + concerns for     Consult acknowledged. This is a 43 yo man. Neurology is consulted due to seizure activity + concerns for meningitis.     He has a history of anxiety and possibly seizure disorder. Regarding his seizures. They are mostly GTC, per the patient's significant other and he follows at [facility]. He is on keppra 1 g bid. MRI in the past as well as EEG have been unremarkable, per the patient's significant other.     He admitted to sometimes not take his keppra. He had multiple episodes today concerning for seizures. Besides this, he also complained of myalgias, neck pain.    At arrival, he was found to be tachycardic. Have high WBC and high lactic acid. CT head showed no abnormalities. He was loaded with keppra and an LP was done given concerns for possible meningitis.     Neuro exam unremarkable, patient has not had another seizure event since he was loaded with keppra.     The patient is going to be admitted. Main recommendations from a neurology standpoint at this time would be:    - Continue Atbs  - Continue Keppra 1 g BID  - Once dc, can follow up with us of [facility]  - No EEG at this time   - No MRI brain at this time since he has stabilized and he recently had an LP done (will bias results of MRI)   - Since at this time there is suspicion for meningitis, depending on LP results, we would recommend ID to be involved in case. We will let Primary care take over since no different recommendations at this time.   - If LP results not remarkable and the patient keeps having these events or if new neuro changes happen, please call us back   - Neurology will sign off. Please call neurology if any questions or concerns.

## 2023-05-18 NOTE — SUBJECTIVE & OBJECTIVE
Past Medical History:   Diagnosis Date    Seizures        History reviewed. No pertinent surgical history.    Review of patient's allergies indicates:  No Known Allergies    No current facility-administered medications on file prior to encounter.     Current Outpatient Medications on File Prior to Encounter   Medication Sig    guaifenesin (MUCINEX ORAL) Take 1 tablet by mouth 2 (two) times daily as needed (allergies).    ibuprofen (ADVIL,MOTRIN) 200 MG tablet Take 400-800 mg by mouth 2 (two) times daily as needed for Pain.    levETIRAcetam (KEPPRA) 1000 MG tablet Take 1,000 mg by mouth once daily.    multivitamin (THERAGRAN) per tablet Take 1 tablet by mouth once daily.    omeprazole (PRILOSEC OTC) 20 MG tablet Take 20 mg by mouth daily as needed (heartburn).    diazePAM (VALIUM) 5 MG tablet Take 1 tablet (5 mg total) by mouth once. Take 30 minutes before procedure. for 1 dose    meloxicam (MOBIC) 7.5 MG tablet Take 1 tablet (7.5 mg total) by mouth once daily. (Patient not taking: Reported on 5/18/2023)    methocarbamoL (ROBAXIN) 750 MG Tab Take 1 tablet (750 mg total) by mouth nightly as needed. (Patient not taking: Reported on 5/18/2023)    sertraline (ZOLOFT) 100 MG tablet Take 100 mg by mouth once daily.    [DISCONTINUED] pantoprazole (PROTONIX) 40 MG tablet TAKE 1 TABLET BY MOUTH EVERY DAY     Family History       Problem Relation (Age of Onset)    Cancer Maternal Grandfather, Paternal Grandfather    Heart disease Father, Brother    Mental illness Mother, Father          Tobacco Use    Smoking status: Never    Smokeless tobacco: Never   Substance and Sexual Activity    Alcohol use: Not on file    Drug use: No    Sexual activity: Yes     Review of Systems   Reason unable to perform ROS: supplemented by wife, AMS.   Constitutional:  Positive for diaphoresis and fatigue. Negative for chills and fever.   HENT:  Negative for congestion and trouble swallowing.    Eyes:  Negative for redness and visual disturbance.    Respiratory:  Negative for cough.    Cardiovascular:  Negative for chest pain.   Gastrointestinal:  Positive for nausea and vomiting.   Genitourinary:  Negative for difficulty urinating and dysuria.   Musculoskeletal:  Positive for back pain, myalgias (shoulders) and neck pain (acute on chronic).   Skin:  Negative for rash and wound.   Neurological:  Positive for headaches.   Psychiatric/Behavioral:  Positive for confusion. The patient is nervous/anxious.    Objective:     Vital Signs (Most Recent):  Temp: 97.8 °F (36.6 °C) (05/18/23 1334)  Pulse: 92 (05/18/23 1715)  Resp: (!) 21 (05/18/23 1715)  BP: 115/62 (05/18/23 1715)  SpO2: 97 % (05/18/23 1715) Vital Signs (24h Range):  Temp:  [97.8 °F (36.6 °C)] 97.8 °F (36.6 °C)  Pulse:  [] 92  Resp:  [18-25] 21  SpO2:  [97 %-99 %] 97 %  BP: (115-166)/(62-83) 115/62     Weight: 99.8 kg (220 lb)  Body mass index is 31.57 kg/m².     Physical Exam  Constitutional:       General: He is not in acute distress.     Appearance: Normal appearance. He is not ill-appearing, toxic-appearing or diaphoretic.   HENT:      Head: Normocephalic and atraumatic.      Mouth/Throat:      Mouth: Mucous membranes are moist.      Tongue: Lesions present.   Eyes:      Extraocular Movements: Extraocular movements intact.      Conjunctiva/sclera: Conjunctivae normal.      Pupils: Pupils are equal, round, and reactive to light.   Neck:      Comments: Brudzinskis sign negative  Cardiovascular:      Rate and Rhythm: Normal rate and regular rhythm.      Pulses: Normal pulses.      Heart sounds: No murmur heard.  Pulmonary:      Effort: Pulmonary effort is normal. No respiratory distress.      Breath sounds: Normal breath sounds. No wheezing, rhonchi or rales.   Abdominal:      General: Abdomen is flat. Bowel sounds are normal. There is no distension.      Palpations: Abdomen is soft. There is no mass.   Musculoskeletal:      Right lower leg: No edema.      Left lower leg: No edema.   Skin:      General: Skin is warm and dry.      Capillary Refill: Capillary refill takes less than 2 seconds.      Findings: No bruising or rash.   Neurological:      Mental Status: He is alert. He is disoriented.      Comments: Oriented to person and place. Disoriented to time.    Asks repeated questions with poor short term memory.            CRANIAL NERVES     CN III, IV, VI   Pupils are equal, round, and reactive to light.    Significant Labs: All pertinent labs within the past 24 hours have been reviewed.  ABGs:   Recent Labs   Lab 05/18/23  1427   PH 7.361   PCO2 35.3   HCO3 20.0*   POCSATURATED 77*   BE -5   PO2 42     CBC:   Recent Labs   Lab 05/18/23  1414   WBC 25.57*   HGB 14.7   HCT 45.9        CMP:   Recent Labs   Lab 05/18/23  1414      K 3.8   *   CO2 18*   *   BUN 13   CREATININE 1.3   CALCIUM 9.8   PROT 8.1   ALBUMIN 4.8   BILITOT 0.3   ALKPHOS 95   AST 20   ALT 19   ANIONGAP 16     Lactic Acid:   Recent Labs   Lab 05/18/23  1414   LACTATE 6.0*       Urine Studies:   Recent Labs   Lab 05/18/23  1430   COLORU Colorless*   APPEARANCEUA Clear   PHUR 7.0   SPECGRAV 1.015   PROTEINUA 1+*   GLUCUA Negative   KETONESU Trace*   BILIRUBINUA Negative   OCCULTUA 2+*   NITRITE Negative   LEUKOCYTESUR Negative   RBCUA 2   WBCUA 1   BACTERIA Rare   HYALINECASTS 0       Significant Imaging: I have reviewed all pertinent imaging results/findings within the past 24 hours.

## 2023-05-18 NOTE — MEDICAL/APP STUDENT
"Hospital Medicine   History and Physical (Student Note)  [facility] [place] HOSP MED 4  05/18/2023  5:05 PM    SUBJECTIVE:     Chief Complaint: Encephalopathy     Pt is a 43y/o male with a history of anxiety, seizure disorder, and chronic back pain. He presents with confusion/amnesia and persistently altered mental status following an unwitnessed seizure this morning. Patient's wife (Lakesha) reports that patient went out drinking with a friend last night and returned around midnight. This morning she noted that he had an episode of vomiting and appeared diaphoretic and feverish afterwards. She states that after she left for work he began sending text messages that "made no sense" stating that he was "asleep." At some point after returning home due to concerns about his confusion she found him obtunded, "foaming at the mouth," and not verbally responsive but able to follow some commands. Per wife pt is on Keppra for seizures and sees neurologist at [facility] but does not regularly take full dose as prescribed. Last seizure reported to be approx 1 year ago. Pt states he gets chronic headaches as well as neck and back pain but is unable to say at this time whether this pain is changed from baseline. He currently denies any nausea, vomiting, chills, abdominal pain, neck stiffness, photophobia, sore throat, or cough. His wife states she did not note him having any symptoms of respiratory infection over the past week and per her knowledge he has not been exposed to any sick contacts. Denies recent travel. Patient is an unreliable historian as throughout interview he continues to exhibit short term memory deficits and disorientation to time (states that year is 2021; continues to repeat questions).     Patient lives at home with his wife and children. He is a lifetime nonsmoker. States that he drinks alcohol occasionally throughout the week (1-2 beers) and about 1 night a week will have 8 or more standard drinks. He endorses using marijuana " recreationally but denies all other illicit drug use.             History reviewed. No pertinent surgical history.    Family History   Problem Relation Age of Onset    Mental illness Mother     Heart disease Father     Mental illness Father     Heart disease Brother     Cancer Maternal Grandfather     Cancer Paternal Grandfather        Social History     Socioeconomic History    Marital status:    Tobacco Use    Smoking status: Never    Smokeless tobacco: Never   Substance and Sexual Activity    Drug use: No    Sexual activity: Yes       No current facility-administered medications on file prior to encounter.     Current Outpatient Medications on File Prior to Encounter   Medication Sig Dispense Refill    diazePAM (VALIUM) 5 MG tablet Take 1 tablet (5 mg total) by mouth once. Take 30 minutes before procedure. for 1 dose 1 tablet 0    levETIRAcetam (KEPPRA) 1000 MG tablet Take 1,000 mg by mouth every 12 (twelve) hours.      meloxicam (MOBIC) 7.5 MG tablet Take 1 tablet (7.5 mg total) by mouth once daily. 30 tablet 0    methocarbamoL (ROBAXIN) 750 MG Tab Take 1 tablet (750 mg total) by mouth nightly as needed. 30 tablet 0    pantoprazole (PROTONIX) 40 MG tablet TAKE 1 TABLET BY MOUTH EVERY DAY 30 tablet 0    sertraline (ZOLOFT) 100 MG tablet          Review of patient's allergies indicates:  No Known Allergies        Review of Systems   Constitutional:  Negative for chills and fever.   HENT:  Negative for sinus pain and sore throat.    Eyes:  Negative for blurred vision, double vision and photophobia.   Respiratory:  Negative for cough and shortness of breath.    Cardiovascular:  Negative for chest pain and palpitations.   Gastrointestinal:  Positive for nausea and vomiting. Negative for abdominal pain.   Musculoskeletal:  Positive for back pain and myalgias.   Neurological:  Negative for sensory change and focal weakness.   Psychiatric/Behavioral:  Positive for memory loss. The patient is nervous/anxious.         OBJECTIVE:     Vital Signs Recent:  Temp: 97.8 °F (36.6 °C) (05/18/23 1334)  Pulse: 75 (05/18/23 1645)  Resp: 19 (05/18/23 1645)  BP: 136/66 (05/18/23 1645)  SpO2: 98 % (05/18/23 1645)  Oxygen Documentation:    Flow (L/min): 4           Device (Oxygen Therapy): room air  $ Is the patient on Low Flow Oxygen?: Yes      Vital Signs Range (Last 24H):  Temp:  [97.8 °F (36.6 °C)]   Pulse:  []   Resp:  [18-25]   BP: (136-166)/(66-83)   SpO2:  [98 %-99 %]        Weight:  Body mass index is 31.57 kg/m².  Wt Readings from Last 3 Encounters:   05/18/23 99.8 kg (220 lb)   04/27/23 93.1 kg (205 lb 4 oz)   05/17/21 91.6 kg (202 lb)        Physical Exam  Constitutional:       General: He is not in acute distress.     Appearance: He is not ill-appearing.   HENT:      Head: Normocephalic and atraumatic.   Eyes:      General: No scleral icterus.     Conjunctiva/sclera: Conjunctivae normal.   Cardiovascular:      Rate and Rhythm: Normal rate and regular rhythm.      Pulses: Normal pulses.      Heart sounds: Normal heart sounds.   Pulmonary:      Effort: Pulmonary effort is normal.      Breath sounds: Normal breath sounds.   Abdominal:      General: Abdomen is flat. There is no distension.      Palpations: Abdomen is soft.      Tenderness: There is no abdominal tenderness.   Musculoskeletal:         General: No swelling or deformity.      Cervical back: No rigidity or tenderness.   Skin:     General: Skin is warm and dry.      Capillary Refill: Capillary refill takes less than 2 seconds.   Neurological:      Mental Status: He is alert. He is disoriented.      Sensory: No sensory deficit.        Lab Results   Component Value Date    WBC 25.57 (H) 05/18/2023    HGB 14.7 05/18/2023    HCT 45.9 05/18/2023    MCV 77 (L) 05/18/2023     05/18/2023     Lab Results   Component Value Date/Time    LACTATE 2.3 (H) 05/18/2023 06:24 PM    LACTATE 6.0 (HH) 05/18/2023 02:14 PM       Lab Results   Component Value Date/Time      05/18/2023 02:14 PM    K 3.8 05/18/2023 02:14 PM     (H) 05/18/2023 02:14 PM    CO2 18 (L) 05/18/2023 02:14 PM    ANIONGAP 16 05/18/2023 02:14 PM    PROCAL 0.12 05/18/2023 06:24 PM      CELL COUNT & DIFF    COLOR CSF          Color...     Heme Aliquot          2.0     Appearance, CSF          Clear     RBC, CSF          1     WBC, CSF          1     Diff Segs % CSF          30     Lymphs, CSF          40     Mono/Macrophage, CSF          30     CHEMISTRY / OTHER, CSF    Glucose, CSF          93     Protein, CSF          45     CSF Tube Number          1  1            Diagnostic Results:      ASSESSMENT -- PLAN:   Mr. Jayesh Rose is a 42 y.o. male with a relevant medical history of seizure disorder who is being treated for altered mental status    *Encephelopathy  Pt with history of seizure disorder with presentation suspicious for breakthrough seizure and subsequent encephalopathy. Given episode of n/v and ongoing sx of confusion there is also concern for meningitis or drug toxicity/substance use precipitating seizure. LP with elevated glucose 93 but no increased leukocytes   -Empiric acyclovir and ceftriaxone given for mengingitis in ED   -F/u remaining LP results and bcx  -Neuro consulted, following recs    -continue abx    -continue keppra 1g BID    -no recommendation for EEG at this time   -Continue to monitor for fever or changes in symptoms   -F/u UDS      Metabolic acidosis  Pt with elevated lactate likely due to seizure vs infection.  Lab Results   Component Value Date/Time    LACTATE 2.3 (H) 05/18/2023 06:24 PM    LACTATE 6.0 (HH) 05/18/2023 02:14 PM    ANIONGAP 16 05/18/2023 02:14 PM    CO2 18 (L) 05/18/2023 02:14 PM      -Continue to trend lactate daily, add on prn lactate level q2 if results >2   -CMP daily     Anxiety disorder  Pt reports recent increase in frequency of panic attacks. Does not c/o symptoms of anxiety at this time.   -Holding home SSRI for now in the setting of altered  mentation and neurological concerns   -Will reassess as needed      Lumbar Degenerative Disc Disease   -Holding home mobic and robaxin for now due to altered mentation   -Will continue to monitor for pain and reassess plan as needed        VTE Risk Mitigation (From admission, onward)           Ordered     enoxaparin injection 40 mg  Daily         05/18/23 1744     IP VTE HIGH RISK PATIENT  Once         05/18/23 1744     Place sequential compression device  Until discontinued         05/18/23 1744                      Kori Rabago, MS4  [employer]

## 2023-05-18 NOTE — ED TRIAGE NOTES
"Pt presents to the ED via EMS with complaints of Seizures. Unwitnessed seizure this morning by wife, wife went to work and came back home around 12:45pm, pt was "worse". Pupill unequal, L 4mm and sluggish, right 3mm brisk, equal, GCS 13, retrograde amnesia, unable to identify objects, possibly post ictal, "very bad headache, LKN last night before bed. Pt is very confused does not remember what happened prior to arrival. PT. can identify objects and can answer questions appropriately.   Patient identifiers verified and correct for   LOC: The patient is awake, alert and aware of environment with an appropriate affect, the patient is oriented x 3 and speaking appropriately.   APPEARANCE: Patient appears comfortable and in no acute distress, patient is clean and well groomed.  SKIN: The skin is warm and dry, color consistent with ethnicity, patient has normal skin turgor and moist mucus membranes, skin intact, no breakdown or bruising noted.   MUSCULOSKELETAL: Patient moving all extremities spontaneously, no swelling noted.  RESPIRATORY: Airway is open and patent, respirations are spontaneous, patient has a normal effort and rate, no accessory muscle use noted, pt placed on continuous pulse ox with O2 sats noted at 98% on room air.  CARDIAC: Pt placed on cardiac monitor. Patient has a normal rate and regular rhythm, no edema noted, capillary refill < 3 seconds.   GASTRO: Soft and non tender to palpation, no distention noted, normoactive bowel sounds present in all four quadrants. Pt states bowel movements have been regular.  : Pt denies any pain or frequency with urination.  NEURO: Pt opens eyes spontaneously, behavior appropriate to situation, follows commands, facial expression symmetrical, bilateral hand grasp equal and even, purposeful motor response noted, normal sensation in all extremities when touched with a finger.   "

## 2023-05-18 NOTE — ED NOTES
"Pt is still very confused. Pt doesn't remember that wife was at the bedside, pt disoriented to time and situation. Pt states " this is my second time here today. I came and received an shot and now I'm back." MD notified of occurrence.  "

## 2023-05-18 NOTE — PROVIDER PROGRESS NOTES - EMERGENCY DEPT.
Encounter Date: 5/18/2023    ED Physician Progress Notes          Physician Attestation Statement for Resident:  As the supervising MD   Physician Attestation Statement: I have personally seen and examined this patient.       ***I agree with the history unless otherwise noted.     ***As the supervising MD I agree with the PE unless otherwise noted.      ***I have reviewed and agree with the residents interpretation of the following unless otherwise noted:   ***I have personally reviewed and interpreted the patients laboratory studies:  ***I have personally reviewed and interpreted imaging studies:  ***I have personally reviewed and interpreted the patient's EKG:  Normal sinus rhythm at 86 QRS 84, , no ischemic changes      ***I have also reviewed the following:   ***old records at this facility,   ***records from referring facility   ***old EKGs,   ***old imaging and results    ***As the supervising MD I agree with the treatment, course, plan, and disposition unless otherwise noted.

## 2023-05-19 LAB
ALBUMIN SERPL BCP-MCNC: 4.1 G/DL (ref 3.5–5.2)
ALP SERPL-CCNC: 85 U/L (ref 55–135)
ALT SERPL W/O P-5'-P-CCNC: 17 U/L (ref 10–44)
ANION GAP SERPL CALC-SCNC: 12 MMOL/L (ref 8–16)
AST SERPL-CCNC: 26 U/L (ref 10–40)
BASOPHILS # BLD AUTO: 0.02 K/UL (ref 0–0.2)
BASOPHILS # BLD AUTO: 0.03 K/UL (ref 0–0.2)
BASOPHILS NFR BLD: 0.1 % (ref 0–1.9)
BASOPHILS NFR BLD: 0.1 % (ref 0–1.9)
BILIRUB SERPL-MCNC: 0.4 MG/DL (ref 0.1–1)
BUN SERPL-MCNC: 14 MG/DL (ref 6–20)
CALCIUM SERPL-MCNC: 9 MG/DL (ref 8.7–10.5)
CHLORIDE SERPL-SCNC: 110 MMOL/L (ref 95–110)
CK SERPL-CCNC: 672 U/L (ref 20–200)
CO2 SERPL-SCNC: 23 MMOL/L (ref 23–29)
CREAT SERPL-MCNC: 1.7 MG/DL (ref 0.5–1.4)
DIFFERENTIAL METHOD: ABNORMAL
DIFFERENTIAL METHOD: ABNORMAL
EOSINOPHIL # BLD AUTO: 0 K/UL (ref 0–0.5)
EOSINOPHIL # BLD AUTO: 0 K/UL (ref 0–0.5)
EOSINOPHIL NFR BLD: 0 % (ref 0–8)
EOSINOPHIL NFR BLD: 0 % (ref 0–8)
ERYTHROCYTE [DISTWIDTH] IN BLOOD BY AUTOMATED COUNT: 14.9 % (ref 11.5–14.5)
ERYTHROCYTE [DISTWIDTH] IN BLOOD BY AUTOMATED COUNT: 15.1 % (ref 11.5–14.5)
EST. GFR  (NO RACE VARIABLE): 51.6 ML/MIN/1.73 M^2
GLUCOSE SERPL-MCNC: 126 MG/DL (ref 70–110)
HCT VFR BLD AUTO: 40.1 % (ref 40–54)
HCT VFR BLD AUTO: 42.7 % (ref 40–54)
HGB BLD-MCNC: 12.5 G/DL (ref 14–18)
HGB BLD-MCNC: 13.1 G/DL (ref 14–18)
IMM GRANULOCYTES # BLD AUTO: 0.14 K/UL (ref 0–0.04)
IMM GRANULOCYTES # BLD AUTO: 0.15 K/UL (ref 0–0.04)
IMM GRANULOCYTES NFR BLD AUTO: 0.6 % (ref 0–0.5)
IMM GRANULOCYTES NFR BLD AUTO: 0.7 % (ref 0–0.5)
LYMPHOCYTES # BLD AUTO: 1 K/UL (ref 1–4.8)
LYMPHOCYTES # BLD AUTO: 1 K/UL (ref 1–4.8)
LYMPHOCYTES NFR BLD: 4.4 % (ref 18–48)
LYMPHOCYTES NFR BLD: 4.7 % (ref 18–48)
MAGNESIUM SERPL-MCNC: 2.8 MG/DL (ref 1.6–2.6)
MCH RBC QN AUTO: 24.3 PG (ref 27–31)
MCH RBC QN AUTO: 24.4 PG (ref 27–31)
MCHC RBC AUTO-ENTMCNC: 30.7 G/DL (ref 32–36)
MCHC RBC AUTO-ENTMCNC: 31.2 G/DL (ref 32–36)
MCV RBC AUTO: 78 FL (ref 82–98)
MCV RBC AUTO: 80 FL (ref 82–98)
MONOCYTES # BLD AUTO: 1.1 K/UL (ref 0.3–1)
MONOCYTES # BLD AUTO: 1.2 K/UL (ref 0.3–1)
MONOCYTES NFR BLD: 4.6 % (ref 4–15)
MONOCYTES NFR BLD: 5.4 % (ref 4–15)
NEUTROPHILS # BLD AUTO: 19.9 K/UL (ref 1.8–7.7)
NEUTROPHILS # BLD AUTO: 20.6 K/UL (ref 1.8–7.7)
NEUTROPHILS NFR BLD: 89.2 % (ref 38–73)
NEUTROPHILS NFR BLD: 90.2 % (ref 38–73)
NRBC BLD-RTO: 0 /100 WBC
NRBC BLD-RTO: 0 /100 WBC
PATH REV BLD -IMP: NORMAL
PHOSPHATE SERPL-MCNC: 4.9 MG/DL (ref 2.7–4.5)
PLATELET # BLD AUTO: 265 K/UL (ref 150–450)
PLATELET # BLD AUTO: 290 K/UL (ref 150–450)
PMV BLD AUTO: 11.3 FL (ref 9.2–12.9)
PMV BLD AUTO: 12 FL (ref 9.2–12.9)
POTASSIUM SERPL-SCNC: 3.9 MMOL/L (ref 3.5–5.1)
PROT SERPL-MCNC: 7 G/DL (ref 6–8.4)
RBC # BLD AUTO: 5.14 M/UL (ref 4.6–6.2)
RBC # BLD AUTO: 5.36 M/UL (ref 4.6–6.2)
SODIUM SERPL-SCNC: 145 MMOL/L (ref 136–145)
WBC # BLD AUTO: 22.32 K/UL (ref 3.9–12.7)
WBC # BLD AUTO: 22.84 K/UL (ref 3.9–12.7)

## 2023-05-19 PROCEDURE — 25000003 PHARM REV CODE 250

## 2023-05-19 PROCEDURE — 80053 COMPREHEN METABOLIC PANEL: CPT

## 2023-05-19 PROCEDURE — 11000001 HC ACUTE MED/SURG PRIVATE ROOM

## 2023-05-19 PROCEDURE — 85060 BLOOD SMEAR INTERPRETATION: CPT | Mod: ,,, | Performed by: PATHOLOGY

## 2023-05-19 PROCEDURE — 63600175 PHARM REV CODE 636 W HCPCS

## 2023-05-19 PROCEDURE — A9585 GADOBUTROL INJECTION: HCPCS | Performed by: INTERNAL MEDICINE

## 2023-05-19 PROCEDURE — 36415 COLL VENOUS BLD VENIPUNCTURE: CPT

## 2023-05-19 PROCEDURE — 99233 PR SUBSEQUENT HOSPITAL CARE,LEVL III: ICD-10-PCS | Mod: ,,, | Performed by: INTERNAL MEDICINE

## 2023-05-19 PROCEDURE — 99233 SBSQ HOSP IP/OBS HIGH 50: CPT | Mod: ,,, | Performed by: INTERNAL MEDICINE

## 2023-05-19 PROCEDURE — 25500020 PHARM REV CODE 255: Performed by: INTERNAL MEDICINE

## 2023-05-19 PROCEDURE — 85025 COMPLETE CBC W/AUTO DIFF WBC: CPT | Mod: 91

## 2023-05-19 PROCEDURE — 85060 PATHOLOGIST REVIEW: ICD-10-PCS | Mod: ,,, | Performed by: PATHOLOGY

## 2023-05-19 PROCEDURE — 84100 ASSAY OF PHOSPHORUS: CPT

## 2023-05-19 PROCEDURE — 82550 ASSAY OF CK (CPK): CPT

## 2023-05-19 PROCEDURE — 83735 ASSAY OF MAGNESIUM: CPT

## 2023-05-19 RX ORDER — THIAMINE HCL 100 MG
100 TABLET ORAL DAILY
Status: DISCONTINUED | OUTPATIENT
Start: 2023-05-19 | End: 2023-05-24 | Stop reason: HOSPADM

## 2023-05-19 RX ORDER — GADOBUTROL 604.72 MG/ML
10 INJECTION INTRAVENOUS
Status: COMPLETED | OUTPATIENT
Start: 2023-05-19 | End: 2023-05-19

## 2023-05-19 RX ORDER — SODIUM CHLORIDE 9 MG/ML
INJECTION, SOLUTION INTRAVENOUS CONTINUOUS
Status: ACTIVE | OUTPATIENT
Start: 2023-05-19 | End: 2023-05-20

## 2023-05-19 RX ORDER — SODIUM CHLORIDE 9 MG/ML
INJECTION, SOLUTION INTRAVENOUS CONTINUOUS
Status: DISCONTINUED | OUTPATIENT
Start: 2023-05-19 | End: 2023-05-19

## 2023-05-19 RX ORDER — HYDROXYZINE HYDROCHLORIDE 25 MG/1
25 TABLET, FILM COATED ORAL 3 TIMES DAILY PRN
Status: DISCONTINUED | OUTPATIENT
Start: 2023-05-19 | End: 2023-05-24 | Stop reason: HOSPADM

## 2023-05-19 RX ORDER — PROCHLORPERAZINE MALEATE 5 MG
5 TABLET ORAL 4 TIMES DAILY PRN
Status: DISCONTINUED | OUTPATIENT
Start: 2023-05-19 | End: 2023-05-24 | Stop reason: HOSPADM

## 2023-05-19 RX ORDER — TALC
6 POWDER (GRAM) TOPICAL NIGHTLY PRN
Status: DISCONTINUED | OUTPATIENT
Start: 2023-05-19 | End: 2023-05-24 | Stop reason: HOSPADM

## 2023-05-19 RX ADMIN — ACYCLOVIR SODIUM 730 MG: 1000 INJECTION, SOLUTION INTRAVENOUS at 06:05

## 2023-05-19 RX ADMIN — HYDROXYZINE HYDROCHLORIDE 25 MG: 25 TABLET, FILM COATED ORAL at 08:05

## 2023-05-19 RX ADMIN — PROCHLORPERAZINE MALEATE 5 MG: 5 TABLET ORAL at 09:05

## 2023-05-19 RX ADMIN — ENOXAPARIN SODIUM 40 MG: 40 INJECTION SUBCUTANEOUS at 05:05

## 2023-05-19 RX ADMIN — SODIUM CHLORIDE: 9 INJECTION, SOLUTION INTRAVENOUS at 11:05

## 2023-05-19 RX ADMIN — ACETAMINOPHEN 1000 MG: 500 TABLET ORAL at 11:05

## 2023-05-19 RX ADMIN — CEFTRIAXONE 2 G: 2 INJECTION, POWDER, FOR SOLUTION INTRAMUSCULAR; INTRAVENOUS at 03:05

## 2023-05-19 RX ADMIN — ACYCLOVIR SODIUM 730 MG: 1000 INJECTION, SOLUTION INTRAVENOUS at 09:05

## 2023-05-19 RX ADMIN — LEVETIRACETAM 1000 MG: 500 TABLET, FILM COATED ORAL at 08:05

## 2023-05-19 RX ADMIN — Medication 6 MG: at 08:05

## 2023-05-19 RX ADMIN — SODIUM CHLORIDE, POTASSIUM CHLORIDE, SODIUM LACTATE AND CALCIUM CHLORIDE 1000 ML: 600; 310; 30; 20 INJECTION, SOLUTION INTRAVENOUS at 08:05

## 2023-05-19 RX ADMIN — ONDANSETRON 4 MG: 4 TABLET ORAL at 03:05

## 2023-05-19 RX ADMIN — ACYCLOVIR SODIUM 730 MG: 1000 INJECTION, SOLUTION INTRAVENOUS at 12:05

## 2023-05-19 RX ADMIN — GADOBUTROL 10 ML: 604.72 INJECTION INTRAVENOUS at 04:05

## 2023-05-19 RX ADMIN — Medication 100 MG: at 08:05

## 2023-05-19 RX ADMIN — CEFTRIAXONE 2 G: 2 INJECTION, POWDER, FOR SOLUTION INTRAMUSCULAR; INTRAVENOUS at 05:05

## 2023-05-19 NOTE — PROGRESS NOTES
"[facility] Medicine  Progress Note    Patient Name: Jayesh Rose  MRN: [id number]  Patient Class: IP- Inpatient   Admission Date: 5/18/2023  Length of Stay: 1 days  Attending Physician: Theodora Zhu MD  Primary Care Provider: Du Shaw MD        Subjective:     Principal Problem:Seizure        HPI:  Patient is a 43 yo male with a PMH of anxiety, GERD, and seizure disorder on Keppra who presents to [facility] with altered mental status per wife since waking up the morning of admission. She reports that he has had confusion, retrograde amnesia, and abnormal behavior since this morning. She left for work and received odd messages from him that did not make since so she returned home and found that his confusion and amnesia were worse and he was frothing from the mouth. She states that he was out drinking at a bar with a friend the night before, something he commonly dose, and came back around midnight and seemed in usual state of health. She asked the friend today and friend states there was nothing unusual about their outing. He has a history of tonic clonic seizures that he has been free from for the past several months although last year had to increase the dose of his keppra for breakthrough seizures. His seizures are typically preceded by a prodrome that the patient can feel coming on and followed by a short post ictal state that may involve some confusion, although wife states this current episode is nothing like seizures he has had in the past. He takes his seizure medication daily but often not twice a day as prescribed. Patient is alert with very poor short term memory. He does expresses recent headaches that he is unable to say are different or the same as headaches that he often gets and pain in his neck, back, and hips that is acute and chronic. He repeatedly asks the same questions such as "Why am I here?". He had an episode of vomiting earlier in the morning. He denies chest " pain, shortness of breath, vision changes, or dizziness. He denies sick contacts, any recent travel, recent trauma, and they do not own pets.     He has no smoking history, he does admit to occasional binge drinking of around maybe 8 drinks every once in a while and admits to occasional marijuana use last used 2 weeks ago but no other illicit drug use. He lives with his wife and their young child who have had no abnormal symptoms.    ED course:Vitals stable with high BP and pulse. WBC 25.5. Lactate 6.0. Neurology consulted and LP performed for concern of possible meningitis. CT head unremarkable.       Overview/Hospital Course:  Patient with history of seizures on keppra presented with altered mental status and confusion with short term memory loss after potential seizure activity. Has other symptoms including nausea, diaphoresis, and fever. Admitted for concern of seizure activity with potential meningitis. LP performed but was not suspicious for meningitis. Neurology consulted and MRI brain obtained.       Interval History: Overnight physician was alerted that some labs for CSF were not able to be obtained due to low volume of fluid. Today, patient's status is unchanged. Neurology reconsulted and MRI brain ordered with EEG ordered to be done once MRI complete.     Review of Systems   Reason unable to perform ROS: supplemented by wife, AMS.   Constitutional:  Positive for diaphoresis and fatigue. Negative for chills and fever.   HENT:  Negative for congestion and trouble swallowing.    Eyes:  Negative for redness and visual disturbance.   Respiratory:  Negative for cough.    Cardiovascular:  Negative for chest pain.   Gastrointestinal:  Positive for nausea and vomiting.   Genitourinary:  Negative for difficulty urinating and dysuria.   Musculoskeletal:  Positive for back pain, myalgias (shoulders) and neck pain (acute on chronic).   Skin:  Negative for rash and wound.   Neurological:  Positive for headaches.    Psychiatric/Behavioral:  Positive for confusion. The patient is nervous/anxious.    Objective:     Vital Signs (Most Recent):  Temp: 98.6 °F (37 °C) (05/19/23 1126)  Pulse: 94 (05/19/23 1126)  Resp: 18 (05/19/23 1126)  BP: (!) 153/75 (05/19/23 1126)  SpO2: 96 % (05/19/23 1126) Vital Signs (24h Range):  Temp:  [97.4 °F (36.3 °C)-101.5 °F (38.6 °C)] 98.6 °F (37 °C)  Pulse:  [57-94] 94  Resp:  [17-22] 18  SpO2:  [95 %-98 %] 96 %  BP: (108-153)/(62-78) 153/75     Weight: 99.8 kg (220 lb)  Body mass index is 29.84 kg/m².    Intake/Output Summary (Last 24 hours) at 5/19/2023 1558  Last data filed at 5/19/2023 0558  Gross per 24 hour   Intake 1905.34 ml   Output --   Net 1905.34 ml         Physical Exam  Constitutional:       General: He is not in acute distress.     Appearance: Normal appearance. He is not ill-appearing, toxic-appearing or diaphoretic.   HENT:      Head: Normocephalic and atraumatic.      Mouth/Throat:      Mouth: Mucous membranes are moist.      Tongue: Lesions present.   Eyes:      Extraocular Movements: Extraocular movements intact.      Conjunctiva/sclera: Conjunctivae normal.      Pupils: Pupils are equal, round, and reactive to light.   Neck:      Comments: Brudzinskis sign negative  Cardiovascular:      Rate and Rhythm: Normal rate and regular rhythm.      Pulses: Normal pulses.      Heart sounds: No murmur heard.  Pulmonary:      Effort: Pulmonary effort is normal. No respiratory distress.      Breath sounds: Normal breath sounds. No wheezing, rhonchi or rales.   Abdominal:      General: Abdomen is flat. Bowel sounds are normal. There is no distension.      Palpations: Abdomen is soft. There is no mass.   Musculoskeletal:      Right lower leg: No edema.      Left lower leg: No edema.   Skin:     General: Skin is warm and dry.      Capillary Refill: Capillary refill takes less than 2 seconds.      Findings: No bruising or rash.   Neurological:      Mental Status: He is alert. He is disoriented.       Comments: Oriented to person and place. Disoriented to time.    Asks repeated questions with poor short term memory.    Occasionally becomes anxious with diaphoresis           Significant Labs: All pertinent labs within the past 24 hours have been reviewed.  CBC:   Recent Labs   Lab 05/18/23  1414 05/19/23  0549 05/19/23  1149   WBC 25.57* 22.84* 22.32*   HGB 14.7 13.1* 12.5*   HCT 45.9 42.7 40.1    290 265     CMP:   Recent Labs   Lab 05/18/23  1414 05/19/23  0549    145   K 3.8 3.9   * 110   CO2 18* 23   * 126*   BUN 13 14   CREATININE 1.3 1.7*   CALCIUM 9.8 9.0   PROT 8.1 7.0   ALBUMIN 4.8 4.1   BILITOT 0.3 0.4   ALKPHOS 95 85   AST 20 26   ALT 19 17   ANIONGAP 16 12       Significant Imaging: I have reviewed all pertinent imaging results/findings within the past 24 hours.      Assessment/Plan:      * Seizure  Patient admitted with seizure like activity with prolonged post ictal period vs concern for menengitis as seizure is unlike previous episodes he has had before.Wife states he is inconsistent with prior seizures that he has had. Vitals stable. Physical exam relatively benign. WBC 25.5. Lactate 6.0. Lumbar puncture performed and initial labs positive for slightly elevated glucose and protein but normal WBC not suspicious for meningitis. CT unremarkable.       Differential includes seizure with post ictal state vs encephalitis vs autoimmune or oncologic pathology given symptoms of diaphoresis and fevers that could indicate possible B cell symptoms.    WBC and lactate improving with antibiotics. UDS negative.    Plan:   -Continuing keppra 1g BID  -Started on ceftriaxone and acyclovir  -Neurology consulted, recs as below, will continue to follow    - Continue Atbs   - Continue Keppra 1 g BID   - Once dc, can follow up with [facility]   - No EEG at this time    - No MRI brain at this time since he has stabilized and he recently had an LP done (will bias results of MRI)    - Since  at this time there is suspicion for meningitis, depending on LP results, we would recommend ID to be involved in case. We will let Primary care take over since no different recommendations at this time.     - If LP results not remarkable and the patient keeps having these events or if new neuro changes happen, please call us back    - Neurology will sign off. Please call neurology if any questions or concerns.     - Seizure precautions in place   - F/u CSF and blood cultures  - MRI brain W/WO contrast ordered   - EEG to check seizure activity after MRI completed       Metabolic acidosis  Likely from lactic acidosis from seizure vs infection.  -Trend CMP and lactate qd      Anxiety  Complains that some of his symptoms have felt like panic attacks.   -Holding SSRI to limit psychoactive medications      Encephalopathy, metabolic  Seizure vs encephalitis vs meningitis vs intracranial hemorrhage vs toxic encephalopathy with possible intoxification .  -See seizure for plan.      Class 1 obesity in adult  Body mass index is 29.84 kg/m². Morbid obesity complicates all aspects of disease management from diagnostic modalities to treatment. Weight loss encouraged and health benefits explained to patient.         DDD (degenerative disc disease), lumbar  Likely contributing to current back pain.      GERD (gastroesophageal reflux disease)  Stable.  -Holding PPI inpatient         VTE Risk Mitigation (From admission, onward)         Ordered     enoxaparin injection 40 mg  Daily         05/18/23 1744     IP VTE HIGH RISK PATIENT  Once         05/18/23 1744     Place sequential compression device  Until discontinued         05/18/23 1744                Discharge Planning   ALEJA: 5/21/2023     Code Status: Full Code   Is the patient medically ready for discharge?:     Reason for patient still in hospital (select all that apply): Patient trending condition, Treatment, Imaging and Consult recommendations                     Velasquez  DO Chace  Department of Hospital Medicine   [facility]

## 2023-05-19 NOTE — PLAN OF CARE
Problem: Adult Inpatient Plan of Care  Goal: Plan of Care Review  Outcome: Ongoing, Not Progressing  Goal: Patient-Specific Goal (Individualized)  Outcome: Ongoing, Not Progressing  Goal: Absence of Hospital-Acquired Illness or Injury  Outcome: Ongoing, Not Progressing  Goal: Optimal Comfort and Wellbeing  Outcome: Ongoing, Not Progressing  Goal: Readiness for Transition of Care  Outcome: Ongoing, Not Progressing     Problem: Impaired Wound Healing  Goal: Optimal Wound Healing  Outcome: Ongoing, Not Progressing     Problem: Fall Injury Risk  Goal: Absence of Fall and Fall-Related Injury  Outcome: Ongoing, Not Progressing     Problem: Self-Care Deficit  Goal: Improved Ability to Complete Activities of Daily Living  Outcome: Ongoing, Not Progressing     Problem: Hypertension Acute  Goal: Blood Pressure Within Desired Range  Outcome: Ongoing, Not Progressing     Problem: Confusion Acute  Goal: Optimal Cognitive Function  Outcome: Ongoing, Not Progressing     Problem: Seizure, Active Management  Goal: Absence of Seizure/Seizure-Related Injury  Outcome: Ongoing, Not Progressing     Problem: Fever  Goal: Body Temperature in Desired Range  Outcome: Ongoing, Not Progressing

## 2023-05-19 NOTE — HOSPITAL COURSE
Patient with history of seizures on keppra presented with altered mental status and confusion with short term memory loss after potential seizure activity. Has other symptoms including nausea, diaphoresis, and fever. Admitted for concern of seizure activity with potential meningitis. LP performed but was not suspicious for meningitis. Neurology consulted and MRI brain obtained. MRI brain showed evidence of diffuse restriction in bilateral temporal lobes suspicious for autoimmune encephalitis but can also be seen in postictal encephalitics. ID states low suspicion for infectious etiology. Antimicrobials discontinued and leukocytosis continued to resolve. IVIG started on 5/20 with plans for 5 day course. Repeat LP done, labs are pending. EEG from 5/20 showing one L temporal seizure and ictal rhythmic changes so pt loaded with 2g Keppra by neurology. No further seizures on EEG. Continued on keppra 1500 BID. To get MRI as outpatient and follow up w/ neurology on 6/15.

## 2023-05-19 NOTE — ED NOTES
Pt ask for water. Adult regular diet ordered for pt but verified with Dr. Katrin MD that it was ok for the pt to have fluids by mouth. Pt given a small cup of water and instructed to take small sips at a time to prevent any injuries. Wife is a bedside currently with pt.

## 2023-05-19 NOTE — PROVIDER PROGRESS NOTES - EMERGENCY DEPT.
"Encounter Date: 5/18/2023    ED Physician Progress Notes        Signout Note    I received signout from the previous provider.     Chief complaint:  Seizures (Unwitnessed seizure this morning by wife, wife went to work and came back home around 12:45pm, pt was "worse". Pupill unequal, L 4mm and sluggish, right 3mm brisk, equal, GCS 13, retrograde amnesia, unable to identify objects, possibly post ictal, "very bad headache, LKN last night before bed  )      Pertinent history &exam:  Jayesh Rose is a 42 y.o. male with pertinent PMH  of seizures on Keppra who presents to the emergency department via EMS due to altered mental status, concern for prolonged infusion with seizure-like activity.  Vitals:    05/18/23 1815   BP: 122/63   Pulse: 76   Resp: 18   Temp:        Imaging Studies:    X-Ray Chest AP Portable   Final Result      No acute abnormality.         Electronically signed by: Ross Lawelr   Date:    05/18/2023   Time:    18:23      CT Head Without Contrast   Final Result      No evidence of acute intracranial pathology.      Electronically signed by resident: Boubacar Espinosa   Date:    05/18/2023   Time:    14:03      Electronically signed by: Irineo Mobley MD   Date:    05/18/2023   Time:    14:27          Medications Given:  Medications   acyclovir (ZOVIRAX) 730 mg in dextrose 5 % (D5W) 250 mL IVPB (0 mg Intravenous Stopped 5/18/23 1809)   levETIRAcetam tablet 1,000 mg (has no administration in time range)   sodium chloride 0.9% flush 10 mL (has no administration in time range)   naloxone 0.4 mg/mL injection 0.02 mg (has no administration in time range)   glucose chewable tablet 16 g (has no administration in time range)   glucose chewable tablet 24 g (has no administration in time range)   glucagon (human recombinant) injection 1 mg (has no administration in time range)   enoxaparin injection 40 mg (40 mg Subcutaneous Given 5/18/23 1820)   acetaminophen tablet 650 mg (has no administration in " time range)   dextrose 10% bolus 125 mL 125 mL (has no administration in time range)   dextrose 10% bolus 250 mL 250 mL (has no administration in time range)   levETIRAcetam injection 2,000 mg (2,000 mg Intravenous Given 5/18/23 1416)   cefTRIAXone (ROCEPHIN) 2 g in dextrose 5 % in water (D5W) 5 % 50 mL IVPB (MB+) (0 g Intravenous Stopped 5/18/23 1703)   sodium chloride 0.9% bolus 1,000 mL 1,000 mL (0 mLs Intravenous Stopped 5/18/23 1809)   midazolam (VERSED) 1 mg/mL injection 1 mg (1 mg Intravenous Given 5/18/23 1602)       Pending Items/ MDM:  42 y.o. male who was signed out to me pending a lumbar puncture.  Given patient's headache and neck pain as concern for meningitis and so patient had been started on broad-spectrum antibiotics and a lumbar puncture was done.  Consent form with the lumbar puncture needed to be obtained with patient's wife present as patient had intermittent confusion. Patient tolerated the lumbar puncture without any complications.  Discussions was had with hospital medicine who was admitted    Diagnostic Impression:    1. Seizure    2. Chest pain         ED Disposition Condition    Admit                Patient and/or family understands the plan and is in agreement, verbalized understanding, questions answered    ED Course as of 05/18/23 1905   Thu May 18, 2023   1503 Patient signed out to me pending LP and admit for meningitis/ prolonged postictal state [OO]      ED Course User Index  [OO] MD Nancy White MD  Emergency Medicine

## 2023-05-19 NOTE — SUBJECTIVE & OBJECTIVE
Interval History: Overnight physician was alerted that some labs for CSF were not able to be obtained due to low volume of fluid. Today, patient's status is unchanged. Neurology reconsulted and MRI brain ordered with EEG ordered to be done once MRI complete.     Review of Systems   Reason unable to perform ROS: supplemented by wife, CHRIS.   Constitutional:  Positive for diaphoresis and fatigue. Negative for chills and fever.   HENT:  Negative for congestion and trouble swallowing.    Eyes:  Negative for redness and visual disturbance.   Respiratory:  Negative for cough.    Cardiovascular:  Negative for chest pain.   Gastrointestinal:  Positive for nausea and vomiting.   Genitourinary:  Negative for difficulty urinating and dysuria.   Musculoskeletal:  Positive for back pain, myalgias (shoulders) and neck pain (acute on chronic).   Skin:  Negative for rash and wound.   Neurological:  Positive for headaches.   Psychiatric/Behavioral:  Positive for confusion. The patient is nervous/anxious.    Objective:     Vital Signs (Most Recent):  Temp: 98.6 °F (37 °C) (05/19/23 1126)  Pulse: 94 (05/19/23 1126)  Resp: 18 (05/19/23 1126)  BP: (!) 153/75 (05/19/23 1126)  SpO2: 96 % (05/19/23 1126) Vital Signs (24h Range):  Temp:  [97.4 °F (36.3 °C)-101.5 °F (38.6 °C)] 98.6 °F (37 °C)  Pulse:  [57-94] 94  Resp:  [17-22] 18  SpO2:  [95 %-98 %] 96 %  BP: (108-153)/(62-78) 153/75     Weight: 99.8 kg (220 lb)  Body mass index is 29.84 kg/m².    Intake/Output Summary (Last 24 hours) at 5/19/2023 1558  Last data filed at 5/19/2023 0558  Gross per 24 hour   Intake 1905.34 ml   Output --   Net 1905.34 ml         Physical Exam  Constitutional:       General: He is not in acute distress.     Appearance: Normal appearance. He is not ill-appearing, toxic-appearing or diaphoretic.   HENT:      Head: Normocephalic and atraumatic.      Mouth/Throat:      Mouth: Mucous membranes are moist.      Tongue: Lesions present.   Eyes:      Extraocular  Movements: Extraocular movements intact.      Conjunctiva/sclera: Conjunctivae normal.      Pupils: Pupils are equal, round, and reactive to light.   Neck:      Comments: Brudzinskis sign negative  Cardiovascular:      Rate and Rhythm: Normal rate and regular rhythm.      Pulses: Normal pulses.      Heart sounds: No murmur heard.  Pulmonary:      Effort: Pulmonary effort is normal. No respiratory distress.      Breath sounds: Normal breath sounds. No wheezing, rhonchi or rales.   Abdominal:      General: Abdomen is flat. Bowel sounds are normal. There is no distension.      Palpations: Abdomen is soft. There is no mass.   Musculoskeletal:      Right lower leg: No edema.      Left lower leg: No edema.   Skin:     General: Skin is warm and dry.      Capillary Refill: Capillary refill takes less than 2 seconds.      Findings: No bruising or rash.   Neurological:      Mental Status: He is alert. He is disoriented.      Comments: Oriented to person and place. Disoriented to time.    Asks repeated questions with poor short term memory.    Occasionally becomes anxious with diaphoresis           Significant Labs: All pertinent labs within the past 24 hours have been reviewed.  CBC:   Recent Labs   Lab 05/18/23  1414 05/19/23  0549 05/19/23  1149   WBC 25.57* 22.84* 22.32*   HGB 14.7 13.1* 12.5*   HCT 45.9 42.7 40.1    290 265     CMP:   Recent Labs   Lab 05/18/23  1414 05/19/23  0549    145   K 3.8 3.9   * 110   CO2 18* 23   * 126*   BUN 13 14   CREATININE 1.3 1.7*   CALCIUM 9.8 9.0   PROT 8.1 7.0   ALBUMIN 4.8 4.1   BILITOT 0.3 0.4   ALKPHOS 95 85   AST 20 26   ALT 19 17   ANIONGAP 16 12       Significant Imaging: I have reviewed all pertinent imaging results/findings within the past 24 hours.

## 2023-05-19 NOTE — H&P
"[facility] - [facility] Dept  [facility]  History & Physical    Patient Name: Jayesh Rose  MRN: [id number]  Patient Class: IP- Inpatient  Admission Date: 5/18/2023  Attending Physician: Theodora Zhu MD   Primary Care Provider: Du Shaw MD         Patient information was obtained from patient and ER records.     Subjective:     Principal Problem:Seizure    Chief Complaint:   Chief Complaint   Patient presents with    Seizures     Unwitnessed seizure this morning by wife, wife went to work and came back home around 12:45pm, pt was "worse". Pupill unequal, L 4mm and sluggish, right 3mm brisk, equal, GCS 13, retrograde amnesia, unable to identify objects, possibly post ictal, "very bad headache, LKN last night before bed          HPI: Patient is a 41 yo male with a PMH of anxiety, GERD, and seizure disorder on Keppra who [place] [facility] [place] with altered mental status per wife since waking up the morning of admission. She reports that he has had confusion, retrograde amnesia, and abnormal behavior since this morning. She left for work and received odd messages from him that did not make since so she returned home and found that his confusion and amnesia were worse and he was frothing from the mouth. She states that he was out drinking at a bar with a friend the night before, something he commonly dose, and came back around midnight and seemed in usual state of health. She asked the friend today and friend states there was nothing unusual about their outing. He has a history of tonic clonic seizures that he has been free from for the past several months although last year had to increase the dose of his keppra for breakthrough seizures. His seizures are typically preceded by a prodrome that the patient can feel coming on and followed by a short post ictal state that may involve some confusion, although wife states this current episode is nothing like seizures he has had in the past. He takes his " "seizure medication daily but often not twice a day as prescribed. Patient is alert with very poor short term memory. He does expresses recent headaches that he is unable to say are different or the same as headaches that he often gets and pain in his neck, back, and hips that is acute and chronic. He repeatedly asks the same questions such as "Why am I here?". He had an episode of vomiting earlier in the morning. He denies chest pain, shortness of breath, vision changes, or dizziness. He denies sick contacts, any recent travel, recent trauma, and they do not own pets.     He has no smoking history, he does admit to occasional binge drinking of around maybe 8 drinks every once in a while and admits to occasional marijuana use last used 2 weeks ago but no other illicit drug use. He lives with his wife and their young child who have had no abnormal symptoms.    ED course:Vitals stable with high BP and pulse. WBC 25.5. Lactate 6.0. Neurology consulted and LP performed for concern of possible meningitis. CT head unremarkable.       Past Medical History:   Diagnosis Date    Seizures        History reviewed. No pertinent surgical history.    Review of patient's allergies indicates:  No Known Allergies    No current facility-administered medications on file prior to encounter.     Current Outpatient Medications on File Prior to Encounter   Medication Sig    guaifenesin (MUCINEX ORAL) Take 1 tablet by mouth 2 (two) times daily as needed (allergies).    ibuprofen (ADVIL,MOTRIN) 200 MG tablet Take 400-800 mg by mouth 2 (two) times daily as needed for Pain.    levETIRAcetam (KEPPRA) 1000 MG tablet Take 1,000 mg by mouth once daily.    multivitamin (THERAGRAN) per tablet Take 1 tablet by mouth once daily.    omeprazole (PRILOSEC OTC) 20 MG tablet Take 20 mg by mouth daily as needed (heartburn).    diazePAM (VALIUM) 5 MG tablet Take 1 tablet (5 mg total) by mouth once. Take 30 minutes before procedure. for 1 dose    " meloxicam (MOBIC) 7.5 MG tablet Take 1 tablet (7.5 mg total) by mouth once daily. (Patient not taking: Reported on 5/18/2023)    methocarbamoL (ROBAXIN) 750 MG Tab Take 1 tablet (750 mg total) by mouth nightly as needed. (Patient not taking: Reported on 5/18/2023)    sertraline (ZOLOFT) 100 MG tablet Take 100 mg by mouth once daily.    [DISCONTINUED] pantoprazole (PROTONIX) 40 MG tablet TAKE 1 TABLET BY MOUTH EVERY DAY     Family History       Problem Relation (Age of Onset)    Cancer Maternal Grandfather, Paternal Grandfather    Heart disease Father, Brother    Mental illness Mother, Father          Tobacco Use    Smoking status: Never    Smokeless tobacco: Never   Substance and Sexual Activity    Alcohol use: Not on file    Drug use: No    Sexual activity: Yes     Review of Systems   Reason unable to perform ROS: supplemented by wife, AMS.   Constitutional:  Positive for diaphoresis and fatigue. Negative for chills and fever.   HENT:  Negative for congestion and trouble swallowing.    Eyes:  Negative for redness and visual disturbance.   Respiratory:  Negative for cough.    Cardiovascular:  Negative for chest pain.   Gastrointestinal:  Positive for nausea and vomiting.   Genitourinary:  Negative for difficulty urinating and dysuria.   Musculoskeletal:  Positive for back pain, myalgias (shoulders) and neck pain (acute on chronic).   Skin:  Negative for rash and wound.   Neurological:  Positive for headaches.   Psychiatric/Behavioral:  Positive for confusion. The patient is nervous/anxious.    Objective:     Vital Signs (Most Recent):  Temp: 97.8 °F (36.6 °C) (05/18/23 1334)  Pulse: 92 (05/18/23 1715)  Resp: (!) 21 (05/18/23 1715)  BP: 115/62 (05/18/23 1715)  SpO2: 97 % (05/18/23 1715) Vital Signs (24h Range):  Temp:  [97.8 °F (36.6 °C)] 97.8 °F (36.6 °C)  Pulse:  [] 92  Resp:  [18-25] 21  SpO2:  [97 %-99 %] 97 %  BP: (115-166)/(62-83) 115/62     Weight: 99.8 kg (220 lb)  Body mass index is 31.57  kg/m².     Physical Exam  Constitutional:       General: He is not in acute distress.     Appearance: Normal appearance. He is not ill-appearing, toxic-appearing or diaphoretic.   HENT:      Head: Normocephalic and atraumatic.      Mouth/Throat:      Mouth: Mucous membranes are moist.      Tongue: Lesions present.   Eyes:      Extraocular Movements: Extraocular movements intact.      Conjunctiva/sclera: Conjunctivae normal.      Pupils: Pupils are equal, round, and reactive to light.   Neck:      Comments: Brudzinskis sign negative  Cardiovascular:      Rate and Rhythm: Normal rate and regular rhythm.      Pulses: Normal pulses.      Heart sounds: No murmur heard.  Pulmonary:      Effort: Pulmonary effort is normal. No respiratory distress.      Breath sounds: Normal breath sounds. No wheezing, rhonchi or rales.   Abdominal:      General: Abdomen is flat. Bowel sounds are normal. There is no distension.      Palpations: Abdomen is soft. There is no mass.   Musculoskeletal:      Right lower leg: No edema.      Left lower leg: No edema.   Skin:     General: Skin is warm and dry.      Capillary Refill: Capillary refill takes less than 2 seconds.      Findings: No bruising or rash.   Neurological:      Mental Status: He is alert. He is disoriented.      Comments: Oriented to person and place. Disoriented to time.    Asks repeated questions with poor short term memory.            CRANIAL NERVES     CN III, IV, VI   Pupils are equal, round, and reactive to light.    Significant Labs: All pertinent labs within the past 24 hours have been reviewed.  ABGs:   Recent Labs   Lab 05/18/23  1427   PH 7.361   PCO2 35.3   HCO3 20.0*   POCSATURATED 77*   BE -5   PO2 42     CBC:   Recent Labs   Lab 05/18/23  1414   WBC 25.57*   HGB 14.7   HCT 45.9        CMP:   Recent Labs   Lab 05/18/23  1414      K 3.8   *   CO2 18*   *   BUN 13   CREATININE 1.3   CALCIUM 9.8   PROT 8.1   ALBUMIN 4.8   BILITOT 0.3    ALKPHOS 95   AST 20   ALT 19   ANIONGAP 16     Lactic Acid:   Recent Labs   Lab 05/18/23  1414   LACTATE 6.0*       Urine Studies:   Recent Labs   Lab 05/18/23  1430   COLORU Colorless*   APPEARANCEUA Clear   PHUR 7.0   SPECGRAV 1.015   PROTEINUA 1+*   GLUCUA Negative   KETONESU Trace*   BILIRUBINUA Negative   OCCULTUA 2+*   NITRITE Negative   LEUKOCYTESUR Negative   RBCUA 2   WBCUA 1   BACTERIA Rare   HYALINECASTS 0       Significant Imaging: I have reviewed all pertinent imaging results/findings within the past 24 hours.    Assessment/Plan:     * Seizure  Patient admitted with seizure like activity with prolonged post ictal period vs concern for menengitis as seizure is unlike previous episodes he has had before.Wife states he is inconsistent with  Vitals stable. Physical exam relatively benign. WBC 25.5. Lactate 6.0. Lumbar puncture performed and initial labs positive for slightly elevated glucose and protein but normal WBC. CT unremarkable.      LP studies relatively unremarkable for infection but with positive RBC.    Plan:   -Continuing keppra 1g BID  -Started on ceftriaxone and acyclovir  -Neurology consulted, recs as below    - Continue Atbs   - Continue Keppra 1 g BID   - Once dc, can follow up with [facility]   - No EEG at this time    - No MRI brain at this time since he has stabilized and he recently had an LP done (will bias results of MRI)    - Since at this time there is suspicion for meningitis, depending on LP results, we would recommend ID to be involved in case. We will let Primary care take over since no different recommendations at this time.     - If LP results not remarkable and the patient keeps having these events or if new neuro changes happen, please call us back    - Neurology will sign off. Please call neurology if any questions or concerns.     - Checking UDS   - Seizure precautions in place   - F/u CSF and blood cultures  -Checking xanthochromia for positive RBCs in LP          Metabolic acidosis  Likely from lactic acidosis from seizure vs infection.  -Trend CMP and lactate qd      Anxiety  Complains that some of his symptoms have felt like panic attacks.   -Holding SSRI to limit psychoactive medications      Encephalopathy, metabolic  Seizure vs meningitis vs intracranial hemorrhage vs toxic encephalopathy with possible intoxification .  -See seizure for plan.      Class 1 obesity in adult  Body mass index is 31.57 kg/m². Morbid obesity complicates all aspects of disease management from diagnostic modalities to treatment. Weight loss encouraged and health benefits explained to patient.         DDD (degenerative disc disease), lumbar  Likely contributing to current back pain.      GERD (gastroesophageal reflux disease)  Stable.  -Holding PPI inpatient         VTE Risk Mitigation (From admission, onward)         Ordered     enoxaparin injection 40 mg  Daily         05/18/23 1744     IP VTE HIGH RISK PATIENT  Once         05/18/23 1744     Place sequential compression device  Until discontinued         05/18/23 1744                           Velasquez Mas DO  Department of Hospital Medicine  [facility] - [facility] Dept

## 2023-05-19 NOTE — ASSESSMENT & PLAN NOTE
Patient admitted with seizure like activity with prolonged post ictal period vs concern for menengitis as seizure is unlike previous episodes he has had before.Wife states he is inconsistent with prior seizures that he has had. Vitals stable. Physical exam relatively benign. WBC 25.5. Lactate 6.0. Lumbar puncture performed and initial labs positive for slightly elevated glucose and protein but normal WBC not suspicious for meningitis. CT unremarkable.       Differential includes seizure with post ictal state vs encephalitis vs autoimmune or oncologic pathology given symptoms of diaphoresis and fevers that could indicate possible B cell symptoms.    WBC and lactate improving with antibiotics. UDS negative.    Plan:   -Continuing keppra 1g BID  -Started on ceftriaxone and acyclovir  -Neurology consulted, recs as below, will continue to follow    - Continue Atbs   - Continue Keppra 1 g BID   - Once dc, can follow up with [facility]   - No EEG at this time    - No MRI brain at this time since he has stabilized and he recently had an LP done (will bias results of MRI)    - Since at this time there is suspicion for meningitis, depending on LP results, we would recommend ID to be involved in case. We will let Primary care take over since no different recommendations at this time.     - If LP results not remarkable and the patient keeps having these events or if new neuro changes happen, please call us back    - Neurology will sign off. Please call neurology if any questions or concerns.     - Seizure precautions in place   - F/u CSF and blood cultures  - MRI brain W/WO contrast ordered   - EEG to check seizure activity after MRI completed

## 2023-05-19 NOTE — PLAN OF CARE
Problem: Adult Inpatient Plan of Care  Goal: Plan of Care Review  Outcome: Ongoing, Progressing  Goal: Patient-Specific Goal (Individualized)  Outcome: Ongoing, Progressing  Goal: Absence of Hospital-Acquired Illness or Injury  Outcome: Ongoing, Progressing  Goal: Optimal Comfort and Wellbeing  Outcome: Ongoing, Progressing  Goal: Readiness for Transition of Care  Outcome: Ongoing, Progressing     Problem: Impaired Wound Healing  Goal: Optimal Wound Healing  Outcome: Ongoing, Progressing     Problem: Fall Injury Risk  Goal: Absence of Fall and Fall-Related Injury  Outcome: Ongoing, Progressing     Problem: Self-Care Deficit  Goal: Improved Ability to Complete Activities of Daily Living  Outcome: Ongoing, Progressing     Problem: Hypertension Acute  Goal: Blood Pressure Within Desired Range  Outcome: Ongoing, Progressing     Problem: Confusion Acute  Goal: Optimal Cognitive Function  Outcome: Ongoing, Progressing     Problem: Seizure, Active Management  Goal: Absence of Seizure/Seizure-Related Injury  Outcome: Ongoing, Progressing     Problem: Fever  Goal: Body Temperature in Desired Range  Outcome: Ongoing, Progressing

## 2023-05-19 NOTE — NURSING
Nurses Note -- 4 Eyes      5/18/2023   10:34 PM      Skin assessed during: Admit      [] No Altered Skin Integrity Present    []Prevention Measures Documented      [x] Yes- Altered Skin Integrity Present or Discovered   [x] LDA Added if Not in Epic (Describe Wound)   [x] New Altered Skin Integrity was Present on Admit and Documented in LDA   [] Wound Image Taken    Wound Care Consulted? Yes    Attending Nurse:  Kaylin Romero RN     Second RN/Staff Member: Linh BENJAMIN

## 2023-05-19 NOTE — MEDICAL/APP STUDENT
"  INPATIENT PROGRESS NOTE  [facility] [place] [facility] 4      Admitted on 5/18/2023   Hospital Day: 2     SUBJECTIVE:               Pt is a 41y/o male with a history of anxiety, seizure disorder, and chronic back pain. He presents with confusion/amnesia and persistently altered mental status following an unwitnessed seizure this morning. Patient's wife (Lakesha) reports that patient went out drinking with a friend last night and returned around midnight. This morning she noted that he had an episode of vomiting and appeared diaphoretic and feverish afterwards. She states that after she left for work he began sending text messages that "made no sense" stating that he was "asleep." At some point after returning home due to concerns about his confusion she found him obtunded, "foaming at the mouth," and not verbally responsive but able to follow some commands. Per wife pt is on Keppra for seizures and sees neurologist at [facility] but does not regularly take full dose as prescribed. Last seizure reported to be approx 1 year ago. Pt states he gets chronic headaches as well as neck and back pain but is unable to say at this time whether this pain is changed from baseline. He currently denies any nausea, vomiting, chills, abdominal pain, neck stiffness, photophobia, sore throat, or cough. His wife states she did not note him having any symptoms of respiratory infection over the past week and per her knowledge he has not been exposed to any sick contacts. Denies recent travel. Patient is an unreliable historian as throughout interview he continues to exhibit short term memory deficits and disorientation to time (states that year is 2021; continues to repeat questions).          Interval history: Pt febrile overnight with intermittent nausea well controlled with prn zofran. Per wife, pt has not been eating since arriving to hospital and has also had poor PO fluid intake. He is still unable to retain short term memory for longer than around 2-3 " minutes.      Please refer to H&P for comprehensive past medical, social, and family history.    Review of Systems   Unable to perform ROS: Mental status change       OBJECTIVE:     Vital Signs Recent:  Temp: 98 °F (36.7 °C) (05/19/23 0841)  Pulse: (!) 57 (05/19/23 0841)  Resp: 17 (05/19/23 0841)  BP: 129/71 (05/19/23 0841)  SpO2: 98 % (05/19/23 0841)  Oxygen Documentation:    Flow (L/min): 4           Device (Oxygen Therapy): room air  $ Is the patient on Low Flow Oxygen?: Yes      Vital Signs Range (Last 24H):  Temp:  [97.4 °F (36.3 °C)-101.5 °F (38.6 °C)]   Pulse:  []   Resp:  [17-25]   BP: (108-166)/(62-83)   SpO2:  [95 %-99 %]        I & O (Last 24H):  Intake/Output Summary (Last 24 hours) at 5/19/2023 1039  Last data filed at 5/19/2023 0558  Gross per 24 hour   Intake 1905.34 ml   Output --   Net 1905.34 ml          Physical Exam  HENT:      Head: Normocephalic and atraumatic.   Eyes:      Extraocular Movements: Extraocular movements intact.      Pupils: Pupils are equal, round, and reactive to light.   Cardiovascular:      Rate and Rhythm: Normal rate and regular rhythm.   Pulmonary:      Effort: Pulmonary effort is normal.      Breath sounds: Normal breath sounds. No wheezing.   Abdominal:      General: Abdomen is flat. Bowel sounds are normal. There is no distension.      Palpations: Abdomen is soft.   Musculoskeletal:      Cervical back: Normal range of motion. No rigidity.   Skin:     Coloration: Skin is not jaundiced.      Findings: No lesion or rash.   Neurological:      Mental Status: He is alert. He is disoriented.      Cranial Nerves: No cranial nerve deficit.      Comments: Disoriented to time and place.          Labs:   Recent Labs   Lab 05/18/23  1414 05/19/23  0549    145   K 3.8 3.9   * 110   CO2 18* 23   BUN 13 14   CREATININE 1.3 1.7*   * 126*   CALCIUM 9.8 9.0   MG  --  2.8*   PHOS  --  4.9*     Recent Labs   Lab 05/18/23  1414 05/19/23  0549   ALKPHOS 95 85   ALT 19  17   AST 20 26   ALBUMIN 4.8 4.1   PROT 8.1 7.0   BILITOT 0.3 0.4       Lab Results   Component Value Date    WBC 22.84 (H) 05/19/2023    HGB 13.1 (L) 05/19/2023    HCT 42.7 05/19/2023    MCV 80 (L) 05/19/2023     05/19/2023     Lab Results   Component Value Date/Time    MG 2.8 (H) 05/19/2023 05:49 AM    PHOS 4.9 (H) 05/19/2023 05:49 AM             ASSESSMENT/PLAN:          Assessment : Jayesh Rose is a 42 y.o. male admitted for Seizure        *Encephelopathy  Pt with history of seizure disorder with presentation suspicious for breakthrough seizure and subsequent encephalopathy, suspected 2/2 CNS infection.  LP with elevated glucose 93 but no increased leukocytes              -Empiric acyclovir and ceftriaxone given for mengingitis in ED              -F/u remaining LP results and bcx    -CSF with elevated segmented neutrophils (30%)    -meningitis/encephalitis panel negative    -pending hsv, enterovirus  -Neuro consulted, following recs                          -continue abx                          -continue keppra 1g BID                          -no recommendation for EEG at this time              -Continue to monitor for fever or changes in symptoms              -UDS positive for marijuana only    -MRI brain w. Contrast   -neuro checks q4   -consider ID consult pending MRI results; neuro re-consult   -contact precautions     Acute kidney injury  Likely 2/2 sepsis and/or poor PO fluid intake   -maintenance IVF   -f/u CMP, CPK   -will continue to trend creatinine   -avoid NSAIDs and any nephrotoxic agents  Lab Results   Component Value Date/Time    CREATININE 1.7 (H) 05/19/2023 05:49 AM    CREATININE 1.3 05/18/2023 02:14 PM    CREATININE 1.15 01/16/2019 11:01 AM       Seizure disorder  Pt with reported history of seizure disorder. Follows with neurologist at [facility], but not fully compliant with medication   -cont. Home Keppra 1g BID   -consider 1hr EEG study pending MRI results to investigate for  evidence of ongoing seizure activity   -neuro checks q4   -seizure precautions    Anxiety disorder  Pt reports recent increase in frequency of panic attacks. Does not c/o symptoms of anxiety at this time.              -Holding home SSRI for now in the setting of altered mentation and neurological concerns              -Will reassess as needed        Lumbar Degenerative Disc Disease              -Holding home mobic and robaxin for now due to altered mentation              -Will continue to monitor for pain and reassess plan as needed           VTE Risk Mitigation (From admission, onward)              Ordered       enoxaparin injection 40 mg  Daily         05/18/23 1744       IP VTE HIGH RISK PATIENT  Once         05/18/23 1744       Place sequential compression device  Until discontinued         05/18/23 1744                             Kori Rabago MS4  [employer]

## 2023-05-19 NOTE — NURSING
""[facility] virus by [facility]  " Lab notified that MD placed this order as an add on. But this has to be a separate order not an add on. Placed a new order.   "

## 2023-05-19 NOTE — ASSESSMENT & PLAN NOTE
Complains that some of his symptoms have felt like panic attacks.   -Holding SSRI to limit psychoactive medications

## 2023-05-19 NOTE — ASSESSMENT & PLAN NOTE
Seizure vs encephalitis vs meningitis vs intracranial hemorrhage vs toxic encephalopathy with possible intoxification .  -See seizure for plan.

## 2023-05-19 NOTE — NURSING
Wife called for nausea meds for patient. Patient still confused still having to reorient him to place and situation. Can not remember events from mins ago. Did not remember being nauseated

## 2023-05-19 NOTE — ASSESSMENT & PLAN NOTE
Seizure vs meningitis vs intracranial hemorrhage vs toxic encephalopathy with possible intoxification .  -See seizure for plan.

## 2023-05-19 NOTE — ASSESSMENT & PLAN NOTE
Body mass index is 29.84 kg/m². Morbid obesity complicates all aspects of disease management from diagnostic modalities to treatment. Weight loss encouraged and health benefits explained to patient.

## 2023-05-20 ENCOUNTER — DOCUMENTATION ONLY (OUTPATIENT)
Dept: NEUROLOGY | Facility: CLINIC | Age: 43
End: 2023-05-20

## 2023-05-20 PROBLEM — G04.90 ENCEPHALITIS: Status: ACTIVE | Noted: 2023-05-20

## 2023-05-20 LAB
ALBUMIN SERPL BCP-MCNC: 3.6 G/DL (ref 3.5–5.2)
ALP SERPL-CCNC: 67 U/L (ref 55–135)
ALT SERPL W/O P-5'-P-CCNC: 15 U/L (ref 10–44)
ANION GAP SERPL CALC-SCNC: 9 MMOL/L (ref 8–16)
ANION GAP SERPL CALC-SCNC: 9 MMOL/L (ref 8–16)
AST SERPL-CCNC: 23 U/L (ref 10–40)
BASOPHILS # BLD AUTO: 0.03 K/UL (ref 0–0.2)
BASOPHILS NFR BLD: 0.2 % (ref 0–1.9)
BILIRUB SERPL-MCNC: 0.4 MG/DL (ref 0.1–1)
BLOOD COLLECTION FOR MS PROFILE: NORMAL
BUN SERPL-MCNC: 12 MG/DL (ref 6–20)
BUN SERPL-MCNC: 15 MG/DL (ref 6–20)
CALCIUM SERPL-MCNC: 8.3 MG/DL (ref 8.7–10.5)
CALCIUM SERPL-MCNC: 8.7 MG/DL (ref 8.7–10.5)
CHLORIDE SERPL-SCNC: 108 MMOL/L (ref 95–110)
CHLORIDE SERPL-SCNC: 113 MMOL/L (ref 95–110)
CO2 SERPL-SCNC: 22 MMOL/L (ref 23–29)
CO2 SERPL-SCNC: 24 MMOL/L (ref 23–29)
CREAT SERPL-MCNC: 1.2 MG/DL (ref 0.5–1.4)
CREAT SERPL-MCNC: 1.6 MG/DL (ref 0.5–1.4)
DIFFERENTIAL METHOD: ABNORMAL
EOSINOPHIL # BLD AUTO: 0 K/UL (ref 0–0.5)
EOSINOPHIL NFR BLD: 0.1 % (ref 0–8)
ERYTHROCYTE [DISTWIDTH] IN BLOOD BY AUTOMATED COUNT: 14.8 % (ref 11.5–14.5)
EST. GFR  (NO RACE VARIABLE): 55.5 ML/MIN/1.73 M^2
EST. GFR  (NO RACE VARIABLE): >60 ML/MIN/1.73 M^2
GLUCOSE SERPL-MCNC: 113 MG/DL (ref 70–110)
GLUCOSE SERPL-MCNC: 150 MG/DL (ref 70–110)
HCT VFR BLD AUTO: 40 % (ref 40–54)
HGB BLD-MCNC: 13 G/DL (ref 14–18)
HSV1, PCR, CSF: NEGATIVE
HSV2, PCR, CSF: NEGATIVE
IMM GRANULOCYTES # BLD AUTO: 0.1 K/UL (ref 0–0.04)
IMM GRANULOCYTES NFR BLD AUTO: 0.6 % (ref 0–0.5)
LYMPHOCYTES # BLD AUTO: 2.1 K/UL (ref 1–4.8)
LYMPHOCYTES NFR BLD: 12 % (ref 18–48)
MAGNESIUM SERPL-MCNC: 2.6 MG/DL (ref 1.6–2.6)
MCH RBC QN AUTO: 25.5 PG (ref 27–31)
MCHC RBC AUTO-ENTMCNC: 32.5 G/DL (ref 32–36)
MCV RBC AUTO: 79 FL (ref 82–98)
MONOCYTES # BLD AUTO: 1 K/UL (ref 0.3–1)
MONOCYTES NFR BLD: 5.9 % (ref 4–15)
NEUTROPHILS # BLD AUTO: 13.9 K/UL (ref 1.8–7.7)
NEUTROPHILS NFR BLD: 81.2 % (ref 38–73)
NRBC BLD-RTO: 0 /100 WBC
PHOSPHATE SERPL-MCNC: 3.5 MG/DL (ref 2.7–4.5)
PLATELET # BLD AUTO: 256 K/UL (ref 150–450)
PMV BLD AUTO: 11.5 FL (ref 9.2–12.9)
POTASSIUM SERPL-SCNC: 2.9 MMOL/L (ref 3.5–5.1)
POTASSIUM SERPL-SCNC: 3.9 MMOL/L (ref 3.5–5.1)
PROT SERPL-MCNC: 6.2 G/DL (ref 6–8.4)
RBC # BLD AUTO: 5.09 M/UL (ref 4.6–6.2)
SODIUM SERPL-SCNC: 141 MMOL/L (ref 136–145)
SODIUM SERPL-SCNC: 144 MMOL/L (ref 136–145)
WBC # BLD AUTO: 17.1 K/UL (ref 3.9–12.7)

## 2023-05-20 PROCEDURE — 63600175 PHARM REV CODE 636 W HCPCS

## 2023-05-20 PROCEDURE — 25000003 PHARM REV CODE 250

## 2023-05-20 PROCEDURE — 63600175 PHARM REV CODE 636 W HCPCS: Mod: JZ,JG | Performed by: STUDENT IN AN ORGANIZED HEALTH CARE EDUCATION/TRAINING PROGRAM

## 2023-05-20 PROCEDURE — 84100 ASSAY OF PHOSPHORUS: CPT

## 2023-05-20 PROCEDURE — 85025 COMPLETE CBC W/AUTO DIFF WBC: CPT

## 2023-05-20 PROCEDURE — 83735 ASSAY OF MAGNESIUM: CPT

## 2023-05-20 PROCEDURE — 99233 SBSQ HOSP IP/OBS HIGH 50: CPT | Mod: ,,, | Performed by: STUDENT IN AN ORGANIZED HEALTH CARE EDUCATION/TRAINING PROGRAM

## 2023-05-20 PROCEDURE — 99233 SBSQ HOSP IP/OBS HIGH 50: CPT | Mod: ,,, | Performed by: PSYCHIATRY & NEUROLOGY

## 2023-05-20 PROCEDURE — 11000001 HC ACUTE MED/SURG PRIVATE ROOM

## 2023-05-20 PROCEDURE — 80053 COMPREHEN METABOLIC PANEL: CPT

## 2023-05-20 PROCEDURE — 95714 VEEG EA 12-26 HR UNMNTR: CPT

## 2023-05-20 PROCEDURE — 36415 COLL VENOUS BLD VENIPUNCTURE: CPT

## 2023-05-20 PROCEDURE — 25000003 PHARM REV CODE 250: Performed by: STUDENT IN AN ORGANIZED HEALTH CARE EDUCATION/TRAINING PROGRAM

## 2023-05-20 PROCEDURE — 36415 COLL VENOUS BLD VENIPUNCTURE: CPT | Performed by: STUDENT IN AN ORGANIZED HEALTH CARE EDUCATION/TRAINING PROGRAM

## 2023-05-20 PROCEDURE — 99233 PR SUBSEQUENT HOSPITAL CARE,LEVL III: ICD-10-PCS | Mod: ,,, | Performed by: PSYCHIATRY & NEUROLOGY

## 2023-05-20 PROCEDURE — 95700 EEG CONT REC W/VID EEG TECH: CPT

## 2023-05-20 PROCEDURE — 99233 PR SUBSEQUENT HOSPITAL CARE,LEVL III: ICD-10-PCS | Mod: ,,, | Performed by: STUDENT IN AN ORGANIZED HEALTH CARE EDUCATION/TRAINING PROGRAM

## 2023-05-20 PROCEDURE — 80048 BASIC METABOLIC PNL TOTAL CA: CPT | Mod: XB

## 2023-05-20 RX ORDER — SODIUM CHLORIDE 9 MG/ML
INJECTION, SOLUTION INTRAVENOUS CONTINUOUS
Status: DISCONTINUED | OUTPATIENT
Start: 2023-05-20 | End: 2023-05-24 | Stop reason: HOSPADM

## 2023-05-20 RX ORDER — LEVETIRACETAM 500 MG/5ML
2000 INJECTION, SOLUTION, CONCENTRATE INTRAVENOUS ONCE
Status: COMPLETED | OUTPATIENT
Start: 2023-05-20 | End: 2023-05-20

## 2023-05-20 RX ORDER — LEVETIRACETAM 750 MG/1
1500 TABLET ORAL EVERY 12 HOURS
Status: DISCONTINUED | OUTPATIENT
Start: 2023-05-20 | End: 2023-05-24 | Stop reason: HOSPADM

## 2023-05-20 RX ADMIN — SODIUM CHLORIDE: 9 INJECTION, SOLUTION INTRAVENOUS at 09:05

## 2023-05-20 RX ADMIN — LEVETIRACETAM 1000 MG: 500 TABLET, FILM COATED ORAL at 08:05

## 2023-05-20 RX ADMIN — CEFTRIAXONE 2 G: 2 INJECTION, POWDER, FOR SOLUTION INTRAMUSCULAR; INTRAVENOUS at 03:05

## 2023-05-20 RX ADMIN — ENOXAPARIN SODIUM 40 MG: 40 INJECTION SUBCUTANEOUS at 04:05

## 2023-05-20 RX ADMIN — LEVETIRACETAM 2000 MG: 100 INJECTION, SOLUTION INTRAVENOUS at 05:05

## 2023-05-20 RX ADMIN — ACETAMINOPHEN 1000 MG: 500 TABLET ORAL at 04:05

## 2023-05-20 RX ADMIN — Medication 100 MG: at 08:05

## 2023-05-20 RX ADMIN — SODIUM CHLORIDE: 9 INJECTION, SOLUTION INTRAVENOUS at 04:05

## 2023-05-20 RX ADMIN — ACYCLOVIR SODIUM 730 MG: 1000 INJECTION, SOLUTION INTRAVENOUS at 02:05

## 2023-05-20 RX ADMIN — ACYCLOVIR SODIUM 730 MG: 1000 INJECTION, SOLUTION INTRAVENOUS at 09:05

## 2023-05-20 RX ADMIN — LEVETIRACETAM 1500 MG: 750 TABLET, FILM COATED ORAL at 09:05

## 2023-05-20 RX ADMIN — SODIUM CHLORIDE 1000 ML: 9 INJECTION, SOLUTION INTRAVENOUS at 10:05

## 2023-05-20 RX ADMIN — HUMAN IMMUNOGLOBULIN G 40 G: 40 LIQUID INTRAVENOUS at 05:05

## 2023-05-20 RX ADMIN — SODIUM ZIRCONIUM CYCLOSILICATE 10 G: 10 POWDER, FOR SUSPENSION ORAL at 10:05

## 2023-05-20 NOTE — PROGRESS NOTES
"[facility] Medicine  Progress Note    Patient Name: Jayesh Rose  MRN: [id number]  Patient Class: IP- Inpatient   Admission Date: 5/18/2023  Length of Stay: 2 days  Attending Physician: Ochoa Sullivan MD  Primary Care Provider: Du Shaw MD        Subjective:     Principal Problem:Seizure        HPI:  Patient is a 41 yo male with a PMH of anxiety, GERD, and seizure disorder on Keppra who presents to [facility] with altered mental status per wife since waking up the morning of admission. She reports that he has had confusion, retrograde amnesia, and abnormal behavior since this morning. She left for work and received odd messages from him that did not make since so she returned home and found that his confusion and amnesia were worse and he was frothing from the mouth. She states that he was out drinking at a bar with a friend the night before, something he commonly dose, and came back around midnight and seemed in usual state of health. She asked the friend today and friend states there was nothing unusual about their outing. He has a history of tonic clonic seizures that he has been free from for the past several months although last year had to increase the dose of his keppra for breakthrough seizures. His seizures are typically preceded by a prodrome that the patient can feel coming on and followed by a short post ictal state that may involve some confusion, although wife states this current episode is nothing like seizures he has had in the past. He takes his seizure medication daily but often not twice a day as prescribed. Patient is alert with very poor short term memory. He does expresses recent headaches that he is unable to say are different or the same as headaches that he often gets and pain in his neck, back, and hips that is acute and chronic. He repeatedly asks the same questions such as "Why am I here?". He had an episode of vomiting earlier in the morning. He denies chest " pain, shortness of breath, vision changes, or dizziness. He denies sick contacts, any recent travel, recent trauma, and they do not own pets.     He has no smoking history, he does admit to occasional binge drinking of around maybe 8 drinks every once in a while and admits to occasional marijuana use last used 2 weeks ago but no other illicit drug use. He lives with his wife and their young child who have had no abnormal symptoms.    ED course:Vitals stable with high BP and pulse. WBC 25.5. Lactate 6.0. Neurology consulted and LP performed for concern of possible meningitis. CT head unremarkable.       Overview/Hospital Course:  Patient with history of seizures on keppra presented with altered mental status and confusion with short term memory loss after potential seizure activity. Has other symptoms including nausea, diaphoresis, and fever. Admitted for concern of seizure activity with potential meningitis. LP performed but was not suspicious for meningitis. Neurology consulted and MRI brain obtained. MRI brain showed evidence of diffuse restriction in bilateral temporal lobes suspicious for autoimmune encephalitis but can also be seen in postictal encephalitics. ID states low suspicion for infectious etiology. Antimicrobials discontinued.       Interval History: Overnight patient received atarax and melatonin. Today, patient's status is unchanged. MRI brain reveals diffuse restriction suspicious for autoimmune encephalitis. Neurology states that additional LP will likely need to be performed.    Review of Systems   Reason unable to perform ROS: supplemented by wife, AMS.   Constitutional:  Positive for diaphoresis and fatigue. Negative for chills and fever.   HENT:  Negative for congestion and trouble swallowing.    Eyes:  Negative for redness and visual disturbance.   Respiratory:  Negative for cough.    Cardiovascular:  Negative for chest pain.   Gastrointestinal:  Positive for nausea and vomiting.    Genitourinary:  Negative for difficulty urinating and dysuria.   Musculoskeletal:  Positive for back pain, myalgias (shoulders) and neck pain (acute on chronic).   Skin:  Negative for rash and wound.   Neurological:  Positive for headaches.   Psychiatric/Behavioral:  Positive for confusion. The patient is nervous/anxious.    Objective:     Vital Signs (Most Recent):  Temp: 98.1 °F (36.7 °C) (05/20/23 1107)  Pulse: 85 (05/20/23 1107)  Resp: 18 (05/20/23 1107)  BP: 138/78 (05/20/23 1107)  SpO2: 97 % (05/20/23 1107) Vital Signs (24h Range):  Temp:  [97.1 °F (36.2 °C)-98.8 °F (37.1 °C)] 98.1 °F (36.7 °C)  Pulse:  [62-85] 85  Resp:  [18] 18  SpO2:  [96 %-99 %] 97 %  BP: (126-138)/(73-90) 138/78     Weight: 99.8 kg (220 lb)  Body mass index is 29.84 kg/m².  No intake or output data in the 24 hours ending 05/20/23 1232        Physical Exam  Constitutional:       General: He is not in acute distress.     Appearance: Normal appearance. He is not ill-appearing, toxic-appearing or diaphoretic.   HENT:      Head: Normocephalic and atraumatic.      Mouth/Throat:      Mouth: Mucous membranes are moist.      Tongue: Lesions present.   Eyes:      Extraocular Movements: Extraocular movements intact.      Conjunctiva/sclera: Conjunctivae normal.      Pupils: Pupils are equal, round, and reactive to light.   Neck:      Comments: Brudzinskis sign negative  Cardiovascular:      Rate and Rhythm: Normal rate and regular rhythm.      Pulses: Normal pulses.      Heart sounds: No murmur heard.  Pulmonary:      Effort: Pulmonary effort is normal. No respiratory distress.      Breath sounds: Normal breath sounds. No wheezing, rhonchi or rales.   Abdominal:      General: Abdomen is flat. Bowel sounds are normal. There is no distension.      Palpations: Abdomen is soft. There is no mass.   Musculoskeletal:      Right lower leg: No edema.      Left lower leg: No edema.   Skin:     General: Skin is warm and dry.      Capillary Refill: Capillary  refill takes less than 2 seconds.      Findings: No bruising or rash.   Neurological:      Mental Status: He is alert. He is disoriented.      Comments: Oriented to person and place. Disoriented to time.    Asks repeated questions with poor short term memory.    Occasionally becomes anxious with diaphoresis           Significant Labs: All pertinent labs within the past 24 hours have been reviewed.  CBC:   Recent Labs   Lab 05/19/23  0549 05/19/23  1149 05/20/23  0313   WBC 22.84* 22.32* 17.10*   HGB 13.1* 12.5* 13.0*   HCT 42.7 40.1 40.0    265 256       CMP:   Recent Labs   Lab 05/18/23  1414 05/19/23  0549 05/20/23  0313    145 141   K 3.8 3.9 3.9   * 110 108   CO2 18* 23 24   * 126* 150*   BUN 13 14 15   CREATININE 1.3 1.7* 1.6*   CALCIUM 9.8 9.0 8.7   PROT 8.1 7.0 6.2   ALBUMIN 4.8 4.1 3.6   BILITOT 0.3 0.4 0.4   ALKPHOS 95 85 67   AST 20 26 23   ALT 19 17 15   ANIONGAP 16 12 9         Significant Imaging: I have reviewed all pertinent imaging results/findings within the past 24 hours.      Assessment/Plan:      * Seizure  Patient admitted with seizure like activity with prolonged post ictal period vs concern for menengitis as seizure is unlike previous episodes he has had before.Wife states he is inconsistent with prior seizures that he has had. Vitals stable. Physical exam relatively benign. WBC 25.5. Lactate 6.0. Lumbar puncture performed and initial labs positive for slightly elevated glucose and protein but normal WBC not suspicious for meningitis. CT unremarkable.       Differential includes seizure with post ictal state vs encephalitis vs autoimmune or oncologic pathology.    WBC and lactate improving. UDS negative. MRI brain suspicious for autoimmune vs postictal encephalitis.     Plan:   -Continuing keppra 1g BID  -Discontinued ceftriaxone and acyclovir for low suspicion of infectious etiology  -Neurology consulted, recs appreciated.   -Additional LP will likely have to be  performed for additional autoimmune labs  - Seizure precautions in place   - F/u CSF and blood cultures  - EEG to check seizure activity  -Thiamine supplementation        Metabolic acidosis  Likely from lactic acidosis from seizure vs infection.  -Trend CMP and lactate qd      Anxiety  Complains that some of his symptoms have felt like panic attacks.   -Holding SSRI to limit psychoactive medications      Encephalopathy, metabolic  Seizure vs encephalitis vs meningitis vs intracranial hemorrhage vs toxic encephalopathy with possible intoxification .  -See seizure for plan.      Class 1 obesity in adult  Body mass index is 29.84 kg/m². Morbid obesity complicates all aspects of disease management from diagnostic modalities to treatment. Weight loss encouraged and health benefits explained to patient.         DDD (degenerative disc disease), lumbar  Likely contributing to current back pain.      GERD (gastroesophageal reflux disease)  Stable.  -Holding PPI inpatient         VTE Risk Mitigation (From admission, onward)         Ordered     enoxaparin injection 40 mg  Daily         05/18/23 1744     IP VTE HIGH RISK PATIENT  Once         05/18/23 1744     Place sequential compression device  Until discontinued         05/18/23 1744                Discharge Planning   ALEJA: 5/21/2023     Code Status: Full Code   Is the patient medically ready for discharge?: No    Reason for patient still in hospital (select all that apply): Patient trending condition, Treatment and Consult recommendations                     Velasquez Mas DO  Department of Hospital Medicine   [facility] Surg

## 2023-05-20 NOTE — PLAN OF CARE
[facility] - Med S[facility]  Initial Discharge Assessment       Primary Care Provider: Du Shaw MD    Admission Diagnosis: Seizure [R56.9]  Chest pain [R07.9]    Admission Date: 5/18/2023  Expected Discharge Date: 5/21/2023    Transition of Care Barriers: None    Payor: [employer] / Plan: [facility] SELECT PLUS / Product Type: Commercial /     Extended Emergency Contact Information  Primary Emergency Contact: Lakesha Rose  Mobile Phone: [phone]  Relation: Spouse  Preferred language: English   needed? No       Discharge Plan B: Home with family      [id number] - [place], [place] - [address]  [address]  [place] [address]  Phone: [phone] Fax: [phone]      Initial Assessment (most recent)       Adult Discharge Assessment - 05/19/23 1455          Discharge Assessment    Assessment Type Discharge Planning Assessment     Confirmed/corrected address, phone number and insurance Yes     Confirmed Demographics Correct on Facesheet     Source of Information patient;family     Reason For Admission seizure     People in Home spouse     Facility Arrived From: home     Do you expect to return to your current living situation? Yes     Do you have help at home or someone to help you manage your care at home? Yes     Who are your caregiver(s) and their phone number(s)? Lakesha Rose (spouse) [phone]     Prior to hospitilization cognitive status: Alert/Oriented     Current cognitive status: Alert/Oriented     Home Layout Able to live on 1st floor     Equipment Currently Used at Home none     Readmission within 30 days? No     Patient currently being followed by outpatient case management? No     Do you currently have service(s) that help you manage your care at home? No     Do you take prescription medications? Yes     Do you have prescription coverage? Yes     Coverage [facility] SELECT PLUS     Do you have any problems affording any of your prescribed  medications? No     Is the patient taking medications as prescribed? yes     How do you get to doctors appointments? family or friend will provide     Are you on dialysis? No     Do you take coumadin? No     Discharge Plan B Home with family     DME Needed Upon Discharge  --   tbd    Discharge Plan discussed with: Patient;Spouse/sig other     Transition of Care Barriers None        Physical Activity    On average, how many days per week do you engage in moderate to strenuous exercise (like a brisk walk)? 1 day     On average, how many minutes do you engage in exercise at this level? 30 min        Financial Resource Strain    How hard is it for you to pay for the very basics like food, housing, medical care, and heating? Not hard at all        Housing Stability    In the last 12 months, was there a time when you were not able to pay the mortgage or rent on time? No     In the last 12 months, how many places have you lived? 1     In the last 12 months, was there a time when you did not have a steady place to sleep or slept in a shelter (including now)? No        Transportation Needs    In the past 12 months, has lack of transportation kept you from medical appointments or from getting medications? No     In the past 12 months, has lack of transportation kept you from meetings, work, or from getting things needed for daily living? No        Food Insecurity    Within the past 12 months, you worried that your food would run out before you got the money to buy more. Never true     Within the past 12 months, the food you bought just didn't last and you didn't have money to get more. Never true        Stress    Do you feel stress - tense, restless, nervous, or anxious, or unable to sleep at night because your mind is troubled all the time - these days? Not at all        Social Connections    In a typical week, how many times do you talk on the phone with family, friends, or neighbors? More than three times a week     How  often do you get together with friends or relatives? Twice a week     How often do you attend Protestant or Holiness services? Never     Do you belong to any clubs or organizations such as Protestant groups, unions, fraternal or athletic groups, or school groups? No     How often do you attend meetings of the clubs or organizations you belong to? Never     Are you , , , , never , or living with a partner?         Alcohol Use    Q1: How often do you have a drink containing alcohol? 2-4 times a month     Q2: How many drinks containing alcohol do you have on a typical day when you are drinking? 1 or 2     Q3: How often do you have six or more drinks on one occasion? Never

## 2023-05-20 NOTE — ASSESSMENT & PLAN NOTE
Patient admitted with seizure like activity with prolonged post ictal period vs concern for menengitis as seizure is unlike previous episodes he has had before.Wife states he is inconsistent with prior seizures that he has had. Vitals stable. Physical exam relatively benign. WBC 25.5. Lactate 6.0. Lumbar puncture performed and initial labs positive for slightly elevated glucose and protein but normal WBC not suspicious for meningitis. CT unremarkable.       Differential includes seizure with post ictal state vs encephalitis vs autoimmune or oncologic pathology.    WBC and lactate improving. UDS negative. MRI brain suspicious for autoimmune vs postictal encephalitis.     Plan:   -Continuing keppra 1g BID  -Discontinued ceftriaxone and acyclovir for low suspicion of infectious etiology  -Neurology consulted, recs appreciated.   -Additional LP will likely have to be performed for additional autoimmune labs  - Seizure precautions in place   - F/u CSF and blood cultures  - EEG to check seizure activity  -Thiamine supplementation

## 2023-05-20 NOTE — ASSESSMENT & PLAN NOTE
the patient is a 41 yo man. neurology initially cosulted for concerns of breakthrough seizures in the setting of signs and symptoms concerning for meningitis. He has a history of anxiety and possibly seizure disorder. Regarding his seizures. They are mostly GTC, per the patient's significant other and he follows at [facility]. He is on keppra 1 g bid. MRI in the past as well as EEG have been unremarkable, per the patient's significant other. He admitted to sometimes not take his keppra. He had multiple episodes today concerning for seizures. Besides this, he also complained of myalgias, neck pain. At arrival, he was found to be tachycardic. Have high WBC and high lactic acid. CT head showed no abnormalities. He was loaded with keppra and an LP was done given concerns for possible meningitis. our initial recommendations included continuing keppra 1 g bid and depending on LP results, let ID take over if infectious or call us back if non-specific and still symptomatic. LP results non specific but not concerning for infections. the patient is still having short term memory problems. the primary team got an MRI w wo c on 5/19 which showed difusse restriction of bilateral mesial temporal lobes. 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 No obvious clinical seizures since admission, on keppra. LP results not concerning for infectious ethology. Case reviewed with ID who stated this presentation is not concerning for infection at this time. Given location and history, this would favor autoimmune as the most likely ethology. Will plan to repeat LP focusing on autoimmune pathology and will plan to start IVIG    - Continue keppra 1 g bid  - EEG when possibly. preferable long term given possible autoimmune encephalitis.   - Seizure precautions   - Avoid common inpatient medications that lower seizure threshold  like benadryl, toradol, baclofen.   - discussed with ID and primary team. Plans for repeat LP and IVIG   - IVIG for 3-5 days. Dynamic approach, will monitor response closely.

## 2023-05-20 NOTE — HPI
the patient is a 41 yo man. neurology initially cosulted for concerns of breakthrough seizures in the setting of signs and symptoms concerning for meningitis. He has a history of anxiety and possibly seizure disorder. Regarding his seizures. They are mostly GTC, per the patient's significant other and he follows at [facility]. He is on keppra 1 g bid. MRI in the past as well as EEG have been unremarkable, per the patient's significant other. He admitted to sometimes not take his keppra. He had multiple episodes today concerning for seizures. Besides this, he also complained of myalgias, neck pain. At arrival, he was found to be tachycardic. Have high WBC and high lactic acid. CT head showed no abnormalities. He was loaded with keppra and an LP was done given concerns for possible meningitis. our initial recommendations included continuing keppra 1 g bid and depending on LP results, let ID take over if infectious or call us back if non-specific and still symptomatic. LP results non specific but not concerning for infections. the patient is still having short term memory problems. the primary team got an MRI w wo c on 5/19 which showed difusse restriction of bilateral mesial temporal lobes. Neurology reconsulted

## 2023-05-20 NOTE — SUBJECTIVE & OBJECTIVE
Interval History: Overnight patient received atarax and melatonin. Today, patient's status is unchanged. MRI brain reveals diffuse restriction suspicious for autoimmune encephalitis. Neurology states that additional LP will likely need to be performed.    Review of Systems   Reason unable to perform ROS: supplemented by wife, AMS.   Constitutional:  Positive for diaphoresis and fatigue. Negative for chills and fever.   HENT:  Negative for congestion and trouble swallowing.    Eyes:  Negative for redness and visual disturbance.   Respiratory:  Negative for cough.    Cardiovascular:  Negative for chest pain.   Gastrointestinal:  Positive for nausea and vomiting.   Genitourinary:  Negative for difficulty urinating and dysuria.   Musculoskeletal:  Positive for back pain, myalgias (shoulders) and neck pain (acute on chronic).   Skin:  Negative for rash and wound.   Neurological:  Positive for headaches.   Psychiatric/Behavioral:  Positive for confusion. The patient is nervous/anxious.    Objective:     Vital Signs (Most Recent):  Temp: 98.1 °F (36.7 °C) (05/20/23 1107)  Pulse: 85 (05/20/23 1107)  Resp: 18 (05/20/23 1107)  BP: 138/78 (05/20/23 1107)  SpO2: 97 % (05/20/23 1107) Vital Signs (24h Range):  Temp:  [97.1 °F (36.2 °C)-98.8 °F (37.1 °C)] 98.1 °F (36.7 °C)  Pulse:  [62-85] 85  Resp:  [18] 18  SpO2:  [96 %-99 %] 97 %  BP: (126-138)/(73-90) 138/78     Weight: 99.8 kg (220 lb)  Body mass index is 29.84 kg/m².  No intake or output data in the 24 hours ending 05/20/23 1232        Physical Exam  Constitutional:       General: He is not in acute distress.     Appearance: Normal appearance. He is not ill-appearing, toxic-appearing or diaphoretic.   HENT:      Head: Normocephalic and atraumatic.      Mouth/Throat:      Mouth: Mucous membranes are moist.      Tongue: Lesions present.   Eyes:      Extraocular Movements: Extraocular movements intact.      Conjunctiva/sclera: Conjunctivae normal.      Pupils: Pupils are equal,  round, and reactive to light.   Neck:      Comments: Brudzinskis sign negative  Cardiovascular:      Rate and Rhythm: Normal rate and regular rhythm.      Pulses: Normal pulses.      Heart sounds: No murmur heard.  Pulmonary:      Effort: Pulmonary effort is normal. No respiratory distress.      Breath sounds: Normal breath sounds. No wheezing, rhonchi or rales.   Abdominal:      General: Abdomen is flat. Bowel sounds are normal. There is no distension.      Palpations: Abdomen is soft. There is no mass.   Musculoskeletal:      Right lower leg: No edema.      Left lower leg: No edema.   Skin:     General: Skin is warm and dry.      Capillary Refill: Capillary refill takes less than 2 seconds.      Findings: No bruising or rash.   Neurological:      Mental Status: He is alert. He is disoriented.      Comments: Oriented to person and place. Disoriented to time.    Asks repeated questions with poor short term memory.    Occasionally becomes anxious with diaphoresis           Significant Labs: All pertinent labs within the past 24 hours have been reviewed.  CBC:   Recent Labs   Lab 05/19/23  0549 05/19/23  1149 05/20/23  0313   WBC 22.84* 22.32* 17.10*   HGB 13.1* 12.5* 13.0*   HCT 42.7 40.1 40.0    265 256       CMP:   Recent Labs   Lab 05/18/23  1414 05/19/23  0549 05/20/23  0313    145 141   K 3.8 3.9 3.9   * 110 108   CO2 18* 23 24   * 126* 150*   BUN 13 14 15   CREATININE 1.3 1.7* 1.6*   CALCIUM 9.8 9.0 8.7   PROT 8.1 7.0 6.2   ALBUMIN 4.8 4.1 3.6   BILITOT 0.3 0.4 0.4   ALKPHOS 95 85 67   AST 20 26 23   ALT 19 17 15   ANIONGAP 16 12 9         Significant Imaging: I have reviewed all pertinent imaging results/findings within the past 24 hours.

## 2023-05-20 NOTE — PROGRESS NOTES
[facility]  Neurology  Progress Note    Patient Name: Jayesh Rose  MRN: [id number]  Admission Date: 5/18/2023  Hospital Length of Stay: 2 days  Code Status: Full Code   Attending Provider: Ochoa Sullivan MD  Primary Care Physician: Du Shaw MD   Principal Problem:Seizure    HPI:   the patient is a 41 yo man. neurology initially cosulted for concerns of breakthrough seizures in the setting of signs and symptoms concerning for meningitis. He has a history of anxiety and possibly seizure disorder. Regarding his seizures. They are mostly GTC, per the patient's significant other and he follows at [facility]. He is on keppra 1 g bid. MRI in the past as well as EEG have been unremarkable, per the patient's significant other. He admitted to sometimes not take his keppra. He had multiple episodes today concerning for seizures. Besides this, he also complained of myalgias, neck pain. At arrival, he was found to be tachycardic. Have high WBC and high lactic acid. CT head showed no abnormalities. He was loaded with keppra and an LP was done given concerns for possible meningitis. our initial recommendations included continuing keppra 1 g bid and depending on LP results, let ID take over if infectious or call us back if non-specific and still symptomatic. LP results non specific but not concerning for infections. the patient is still having short term memory problems. the primary team got an MRI w wo c on 5/19 which showed difusse restriction of bilateral mesial temporal lobes. Neurology reconsulted       Subjective:     Interval History: MRI concerning for encephalitis affecting bilateral temporal lobes.     Current Neurological Medications: as below     Current Facility-Administered Medications   Medication Dose Route Frequency Provider Last Rate Last Admin    acetaminophen tablet 1,000 mg  1,000 mg Oral Q6H PRN Miles Beavers DO   1,000 mg at 05/19/23 1132    dextrose 10% bolus 125 mL 125 mL  12.5 g  Intravenous PRN Theodora Zhu MD        dextrose 10% bolus 250 mL 250 mL  25 g Intravenous PRN Theodora Zhu MD        enoxaparin injection 40 mg  40 mg Subcutaneous Daily Velasquez Mas,    40 mg at 05/19/23 1701    glucagon (human recombinant) injection 1 mg  1 mg Intramuscular PRN Velasquez Mas, DO        glucose chewable tablet 16 g  16 g Oral PRN Velasquez Mas, DO        glucose chewable tablet 24 g  24 g Oral PRN Velasquez Mas, DO        hydrOXYzine HCL tablet 25 mg  25 mg Oral TID PRN Elda Manzanares MD   25 mg at 05/19/23 2042    levETIRAcetam tablet 1,000 mg  1,000 mg Oral Q12H Velasquez Mas, DO   1,000 mg at 05/20/23 0811    melatonin tablet 6 mg  6 mg Oral Nightly PRN Elda Manzanares MD   6 mg at 05/19/23 2042    naloxone 0.4 mg/mL injection 0.02 mg  0.02 mg Intravenous PRN Velasquez Mas DO        ondansetron tablet 4 mg  4 mg Oral Q6H PRN Miles Beavers DO   4 mg at 05/19/23 0334    prochlorperazine tablet 5 mg  5 mg Oral QID PRN Velasquez Mas, DO   5 mg at 05/19/23 0951    sodium chloride 0.9% flush 10 mL  10 mL Intravenous Q12H PRN Velasquez Mas,         thiamine tablet 100 mg  100 mg Oral Daily Velasquez Mas, DO   100 mg at 05/20/23 0811       Review of Systems   Constitutional:  Positive for fatigue. Negative for chills, diaphoresis and fever.   HENT:  Negative for congestion and trouble swallowing.    Eyes:  Negative for redness and visual disturbance.   Respiratory:  Negative for cough.    Cardiovascular:  Negative for chest pain.   Gastrointestinal:  Positive for nausea and vomiting.   Genitourinary:  Negative for difficulty urinating and dysuria.   Musculoskeletal:  Positive for back pain, myalgias (shoulders) and neck pain (acute on chronic).   Skin:  Negative for rash and wound.   Neurological:  Positive for headaches. Negative for dizziness, tremors, seizures, syncope, facial asymmetry, speech difficulty, weakness, light-headedness and numbness.    Psychiatric/Behavioral:  Positive for confusion and decreased concentration. Negative for agitation, behavioral problems, dysphoric mood, hallucinations, self-injury, sleep disturbance and suicidal ideas. The patient is nervous/anxious. The patient is not hyperactive.    Objective:     Vital Signs (Most Recent):  Temp: 98.1 °F (36.7 °C) (05/20/23 1107)  Pulse: 85 (05/20/23 1107)  Resp: 18 (05/20/23 1107)  BP: 138/78 (05/20/23 1107)  SpO2: 97 % (05/20/23 1107) Vital Signs (24h Range):  Temp:  [97.1 °F (36.2 °C)-98.8 °F (37.1 °C)] 98.1 °F (36.7 °C)  Pulse:  [62-85] 85  Resp:  [18] 18  SpO2:  [96 %-99 %] 97 %  BP: (126-138)/(73-90) 138/78     Weight: 99.8 kg (220 lb)  Body mass index is 29.84 kg/m².     Physical Exam  Constitutional:       General: He is not in acute distress.     Appearance: Normal appearance. He is not ill-appearing, toxic-appearing or diaphoretic.   HENT:      Head: Normocephalic and atraumatic.      Mouth/Throat:      Mouth: Mucous membranes are moist.      Tongue: Lesions present.   Eyes:      Extraocular Movements: Extraocular movements intact.      Conjunctiva/sclera: Conjunctivae normal.      Pupils: Pupils are equal, round, and reactive to light.   Cardiovascular:      Rate and Rhythm: Normal rate and regular rhythm.      Pulses: Normal pulses.      Heart sounds: No murmur heard.  Pulmonary:      Effort: Pulmonary effort is normal. No respiratory distress.      Breath sounds: Normal breath sounds. No wheezing, rhonchi or rales.   Abdominal:      General: Abdomen is flat. Bowel sounds are normal. There is no distension.      Palpations: Abdomen is soft. There is no mass.   Musculoskeletal:      Right lower leg: No edema.      Left lower leg: No edema.   Skin:     General: Skin is warm and dry.      Capillary Refill: Capillary refill takes less than 2 seconds.      Findings: No bruising or rash.   Neurological:      Mental Status: He is alert.      Cranial Nerves: Cranial nerves 2-12 are  intact.      Coordination: Finger-Nose-Finger Test normal.      Deep Tendon Reflexes:      Reflex Scores:       Tricep reflexes are 2+ on the right side and 2+ on the left side.       Bicep reflexes are 2+ on the right side and 2+ on the left side.       Brachioradialis reflexes are 2+ on the right side and 2+ on the left side.       Patellar reflexes are 2+ on the right side and 2+ on the left side.       Achilles reflexes are 2+ on the right side and 2+ on the left side.  Psychiatric:         Speech: Speech normal.        NEUROLOGICAL EXAMINATION:     MENTAL STATUS   Disoriented to time.   Attention: decreased. Concentration: decreased.   Speech: speech is normal   Level of consciousness: alert    CRANIAL NERVES   Cranial nerves II through XII intact.     CN II   Visual fields full to confrontation.     CN III, IV, VI   Pupils are equal, round, and reactive to light.    CN V   Facial sensation intact.     CN VII   Facial expression full, symmetric.     CN VIII   CN VIII normal.     CN IX, X   CN IX normal.   CN X normal.     CN XI   CN XI normal.     CN XII   CN XII normal.     MOTOR EXAM   Muscle bulk: normal  Overall muscle tone: normal    Strength   Right neck flexion: 5/5  Left neck flexion: 5/5  Right neck extension: 5/5  Left neck extension: 5/5  Right deltoid: 5/5  Left deltoid: 5/5  Right biceps: 5/5  Left biceps: 5/5  Right triceps: 5/5  Left triceps: 5/5  Right wrist flexion: 5/5  Left wrist flexion: 5/5  Right wrist extension: 5/5  Left wrist extension: 5/5  Right interossei: 5/5  Left interossei: 5/5  Right abdominals: 5/5  Left abdominals: 5/5  Right iliopsoas: 5/5  Left iliopsoas: 5/5  Right quadriceps: 5/5  Left quadriceps: 5/5  Right hamstrin/5  Left hamstrin/5  Right glutei: 5/5  Left glutei: 5/5  Right anterior tibial: 5/5  Left anterior tibial: 5/5  Right posterior tibial: 5/5  Left posterior tibial: 5/5  Right peroneal: 5/5  Left peroneal: 5/5  Right gastroc: 5/5  Left gastroc:  5/5    REFLEXES     Reflexes   Right brachioradialis: 2+  Left brachioradialis: 2+  Right biceps: 2+  Left biceps: 2+  Right triceps: 2+  Left triceps: 2+  Right patellar: 2+  Left patellar: 2+  Right achilles: 2+  Left achilles: 2+  Right : 2+  Left : 2+    SENSORY EXAM   Right arm light touch: normal  Left arm light touch: normal  Right leg light touch: normal  Left leg light touch: normal    GAIT AND COORDINATION      Coordination   Finger to nose coordination: normal    Tremor   Resting tremor: absent    Significant Labs:   Blood Culture:   Recent Labs   Lab 05/18/23  1825 05/18/23 1828   LABBLOO No Growth to date  No Growth to date No Growth to date  No Growth to date     BMP:   Recent Labs   Lab 05/19/23  0549 05/20/23  0313   * 150*    141   K 3.9 3.9    108   CO2 23 24   BUN 14 15   CREATININE 1.7* 1.6*   CALCIUM 9.0 8.7   MG 2.8* 2.6     CBC:   Recent Labs   Lab 05/19/23  0549 05/19/23  1149 05/20/23  0313   WBC 22.84* 22.32* 17.10*   HGB 13.1* 12.5* 13.0*   HCT 42.7 40.1 40.0    265 256     CMP:   Recent Labs   Lab 05/19/23  0549 05/20/23  0313   * 150*    141   K 3.9 3.9    108   CO2 23 24   BUN 14 15   CREATININE 1.7* 1.6*   CALCIUM 9.0 8.7   MG 2.8* 2.6   PROT 7.0 6.2   ALBUMIN 4.1 3.6   BILITOT 0.4 0.4   ALKPHOS 85 67   AST 26 23   ALT 17 15   ANIONGAP 12 9     CSF Culture:   Recent Labs   Lab 05/18/23  1624   CSFCULTURE No Growth to date     CSF Studies:   Recent Labs   Lab 05/18/23  1624   ALIQUT 2.0   APPEARCSF Clear   COLORCSF Colorless   CSFWBC 1   CSFRBC 1*   GLUCCSF 93*   PROTEINCSF 45*     Inflammatory Markers:   Recent Labs   Lab 05/18/23  1824   PROCAL 0.12     All pertinent lab results from the past 24 hours have been reviewed.    Significant Imaging:     MRI brain w wo contrast 5/20:        Symmetric FLAIR a diffusion restriction involving the bilateral mesial temporal lobes.  No associated abnormal enhancement.  While findings can be  seen in the reported postictal setting, distribution is suggestive of autoimmune encephalitis.  Infectious encephalitis felt less likely.  Consider further evaluation with CSF markers.    I have reviewed and interpreted all pertinent imaging results/findings within the past 24 hours.    Assessment and Plan:     Encephalitis  the patient is a 41 yo man. neurology initially cosulted for concerns of breakthrough seizures in the setting of signs and symptoms concerning for meningitis. He has a history of anxiety and possibly seizure disorder. Regarding his seizures. They are mostly GTC, per the patient's significant other and he follows at [facility]. He is on keppra 1 g bid. MRI in the past as well as EEG have been unremarkable, per the patient's significant other. He admitted to sometimes not take his keppra. He had multiple episodes today concerning for seizures. Besides this, he also complained of myalgias, neck pain. At arrival, he was found to be tachycardic. Have high WBC and high lactic acid. CT head showed no abnormalities. He was loaded with keppra and an LP was done given concerns for possible meningitis. our initial recommendations included continuing keppra 1 g bid and depending on LP results, let ID take over if infectious or call us back if non-specific and still symptomatic. LP results non specific but not concerning for infections. the patient is still having short term memory problems. the primary team got an MRI w wo c on 5/19 which showed difusse restriction of bilateral mesial temporal lobes. 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 No obvious clinical seizures since admission, on keppra. LP results not concerning for infectious ethology. Case reviewed with ID who stated this presentation is not concerning for infection at this time. Given location and history, this would favor  autoimmune as the most likely ethology. Will plan to repeat LP focusing on autoimmune pathology and will plan to start IVIG    - Continue keppra 1 g bid  - EEG when possibly. preferable long term given possible autoimmune encephalitis.   - Seizure precautions   - Avoid common inpatient medications that lower seizure threshold like benadryl, toradol, baclofen.   - discussed with ID and primary team. Plans for repeat LP and IVIG   - IVIG for 3-5 days. Dynamic approach, will monitor response closely.             VTE Risk Mitigation (From admission, onward)         Ordered     enoxaparin injection 40 mg  Daily         05/18/23 1744     IP VTE HIGH RISK PATIENT  Once         05/18/23 1744     Place sequential compression device  Until discontinued         05/18/23 1744                Te Garcia MD  Neurology  [facility]

## 2023-05-20 NOTE — SUBJECTIVE & OBJECTIVE
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     Current Neurological Medications: as below     Current Facility-Administered Medications   Medication Dose Route Frequency Provider Last Rate Last Admin    acetaminophen tablet 1,000 mg  1,000 mg Oral Q6H PRN Miles Beavers, DO   1,000 mg at 05/19/23 1132    dextrose 10% bolus 125 mL 125 mL  12.5 g Intravenous PRN Theodora Zhu MD        dextrose 10% bolus 250 mL 250 mL  25 g Intravenous PRN Theodora Zhu MD        enoxaparin injection 40 mg  40 mg Subcutaneous Daily Velasquez Mas, DO   40 mg at 05/19/23 1701    glucagon (human recombinant) injection 1 mg  1 mg Intramuscular PRN Velasquez Mas, DO        glucose chewable tablet 16 g  16 g Oral PRN Velasquez Mas, DO        glucose chewable tablet 24 g  24 g Oral PRN Velasquez Mas, DO        hydrOXYzine HCL tablet 25 mg  25 mg Oral TID PRN Elda Manzanares MD   25 mg at 05/19/23 2042    levETIRAcetam tablet 1,000 mg  1,000 mg Oral Q12H Velasquez Mas, DO   1,000 mg at 05/20/23 0811    melatonin tablet 6 mg  6 mg Oral Nightly PRN Elda Manzanares MD   6 mg at 05/19/23 2042    naloxone 0.4 mg/mL injection 0.02 mg  0.02 mg Intravenous PRN Velasquez Mas, DO        ondansetron tablet 4 mg  4 mg Oral Q6H PRN Miles Beavers DO   4 mg at 05/19/23 0334    prochlorperazine tablet 5 mg  5 mg Oral QID PRN Velasquez Mas, DO   5 mg at 05/19/23 0951    sodium chloride 0.9% flush 10 mL  10 mL Intravenous Q12H PRN Velasquez Mas, DO        thiamine tablet 100 mg  100 mg Oral Daily Velasquez Ozborn, DO   100 mg at 05/20/23 0811       Review of Systems   Constitutional:  Positive for fatigue. Negative for chills, diaphoresis and fever.   HENT:  Negative for congestion and trouble swallowing.    Eyes:  Negative for redness and visual disturbance.   Respiratory:  Negative for cough.    Cardiovascular:  Negative for chest pain.   Gastrointestinal:  Positive for nausea and vomiting.    Genitourinary:  Negative for difficulty urinating and dysuria.   Musculoskeletal:  Positive for back pain, myalgias (shoulders) and neck pain (acute on chronic).   Skin:  Negative for rash and wound.   Neurological:  Positive for headaches. Negative for dizziness, tremors, seizures, syncope, facial asymmetry, speech difficulty, weakness, light-headedness and numbness.   Psychiatric/Behavioral:  Positive for confusion and decreased concentration. Negative for agitation, behavioral problems, dysphoric mood, hallucinations, self-injury, sleep disturbance and suicidal ideas. The patient is nervous/anxious. The patient is not hyperactive.    Objective:     Vital Signs (Most Recent):  Temp: 98.1 °F (36.7 °C) (05/20/23 1107)  Pulse: 85 (05/20/23 1107)  Resp: 18 (05/20/23 1107)  BP: 138/78 (05/20/23 1107)  SpO2: 97 % (05/20/23 1107) Vital Signs (24h Range):  Temp:  [97.1 °F (36.2 °C)-98.8 °F (37.1 °C)] 98.1 °F (36.7 °C)  Pulse:  [62-85] 85  Resp:  [18] 18  SpO2:  [96 %-99 %] 97 %  BP: (126-138)/(73-90) 138/78     Weight: 99.8 kg (220 lb)  Body mass index is 29.84 kg/m².     Physical Exam  Constitutional:       General: He is not in acute distress.     Appearance: Normal appearance. He is not ill-appearing, toxic-appearing or diaphoretic.   HENT:      Head: Normocephalic and atraumatic.      Mouth/Throat:      Mouth: Mucous membranes are moist.      Tongue: Lesions present.   Eyes:      Extraocular Movements: Extraocular movements intact.      Conjunctiva/sclera: Conjunctivae normal.      Pupils: Pupils are equal, round, and reactive to light.   Cardiovascular:      Rate and Rhythm: Normal rate and regular rhythm.      Pulses: Normal pulses.      Heart sounds: No murmur heard.  Pulmonary:      Effort: Pulmonary effort is normal. No respiratory distress.      Breath sounds: Normal breath sounds. No wheezing, rhonchi or rales.   Abdominal:      General: Abdomen is flat. Bowel sounds are normal. There is no distension.       Palpations: Abdomen is soft. There is no mass.   Musculoskeletal:      Right lower leg: No edema.      Left lower leg: No edema.   Skin:     General: Skin is warm and dry.      Capillary Refill: Capillary refill takes less than 2 seconds.      Findings: No bruising or rash.   Neurological:      Mental Status: He is alert.      Cranial Nerves: Cranial nerves 2-12 are intact.      Coordination: Finger-Nose-Finger Test normal.      Deep Tendon Reflexes:      Reflex Scores:       Tricep reflexes are 2+ on the right side and 2+ on the left side.       Bicep reflexes are 2+ on the right side and 2+ on the left side.       Brachioradialis reflexes are 2+ on the right side and 2+ on the left side.       Patellar reflexes are 2+ on the right side and 2+ on the left side.       Achilles reflexes are 2+ on the right side and 2+ on the left side.  Psychiatric:         Speech: Speech normal.        NEUROLOGICAL EXAMINATION:     MENTAL STATUS   Disoriented to time.   Attention: decreased. Concentration: decreased.   Speech: speech is normal   Level of consciousness: alert    CRANIAL NERVES   Cranial nerves II through XII intact.     CN II   Visual fields full to confrontation.     CN III, IV, VI   Pupils are equal, round, and reactive to light.    CN V   Facial sensation intact.     CN VII   Facial expression full, symmetric.     CN VIII   CN VIII normal.     CN IX, X   CN IX normal.   CN X normal.     CN XI   CN XI normal.     CN XII   CN XII normal.     MOTOR EXAM   Muscle bulk: normal  Overall muscle tone: normal    Strength   Right neck flexion: 5/5  Left neck flexion: 5/5  Right neck extension: 5/5  Left neck extension: 5/5  Right deltoid: 5/5  Left deltoid: 5/5  Right biceps: 5/5  Left biceps: 5/5  Right triceps: 5/5  Left triceps: 5/5  Right wrist flexion: 5/5  Left wrist flexion: 5/5  Right wrist extension: 5/5  Left wrist extension: 5/5  Right interossei: 5/5  Left interossei: 5/5  Right abdominals: 5/5  Left abdominals:  5/5  Right iliopsoas: 5/5  Left iliopsoas: 5/5  Right quadriceps: 5/5  Left quadriceps: 5/5  Right hamstrin/5  Left hamstrin/5  Right glutei: 5/5  Left glutei: 5/5  Right anterior tibial: 5/5  Left anterior tibial: 5/5  Right posterior tibial: 5/5  Left posterior tibial: 5/5  Right peroneal: 5/5  Left peroneal: 5/5  Right gastroc: 5/5  Left gastroc: 5/5    REFLEXES     Reflexes   Right brachioradialis: 2+  Left brachioradialis: 2+  Right biceps: 2+  Left biceps: 2+  Right triceps: 2+  Left triceps: 2+  Right patellar: 2+  Left patellar: 2+  Right achilles: 2+  Left achilles: 2+  Right : 2+  Left : 2+    SENSORY EXAM   Right arm light touch: normal  Left arm light touch: normal  Right leg light touch: normal  Left leg light touch: normal    GAIT AND COORDINATION      Coordination   Finger to nose coordination: normal    Tremor   Resting tremor: absent    Significant Labs:   Blood Culture:   Recent Labs   Lab 23  1825 23   LABBLOO No Growth to date  No Growth to date No Growth to date  No Growth to date     BMP:   Recent Labs   Lab 23  0549 23  0313   * 150*    141   K 3.9 3.9    108   CO2 23 24   BUN 14 15   CREATININE 1.7* 1.6*   CALCIUM 9.0 8.7   MG 2.8* 2.6     CBC:   Recent Labs   Lab 23  0549 23  1149 23  0313   WBC 22.84* 22.32* 17.10*   HGB 13.1* 12.5* 13.0*   HCT 42.7 40.1 40.0    265 256     CMP:   Recent Labs   Lab 23  0549 23  0313   * 150*    141   K 3.9 3.9    108   CO2 23 24   BUN 14 15   CREATININE 1.7* 1.6*   CALCIUM 9.0 8.7   MG 2.8* 2.6   PROT 7.0 6.2   ALBUMIN 4.1 3.6   BILITOT 0.4 0.4   ALKPHOS 85 67   AST 26 23   ALT 17 15   ANIONGAP 12 9     CSF Culture:   Recent Labs   Lab 23  1624   CSFCULTURE No Growth to date     CSF Studies:   Recent Labs   Lab 23  1624   ALIQUT 2.0   APPEARCSF Clear   COLORCSF Colorless   CSFWBC 1   CSFRBC 1*   GLUCCSF 93*   PROTEINCSF 45*      Inflammatory Markers:   Recent Labs   Lab 05/18/23  1824   PROCAL 0.12     All pertinent lab results from the past 24 hours have been reviewed.    Significant Imaging:     MRI brain w wo contrast 5/20:        Symmetric FLAIR a diffusion restriction involving the bilateral mesial temporal lobes.  No associated abnormal enhancement.  While findings can be seen in the reported postictal setting, distribution is suggestive of autoimmune encephalitis.  Infectious encephalitis felt less likely.  Consider further evaluation with CSF markers.    I have reviewed and interpreted all pertinent imaging results/findings within the past 24 hours.

## 2023-05-20 NOTE — PROCEDURES
Preliminary EEG Read    Background:   Background organization is fair, with a PDR that reaches up to 9 hz.  Transition to sleep is noted.    Ictal: 1 electrographic seizure arising from the left temporal region.  Frequent runs of rhythmic delta activity as well as paroxysmal fast activity, suggestive of brief ictal rhythmic discharges.                    Impression:   Abnormal awake and sleep EEG showing one left temporal seizure and brief ictal rhythmic discharges.      Please hit the EEG button with any clinical activity concerning for seizures and describe what you see.    Detailed report to follow.     Jackelyn Ordonez MD  [facility]   Department of Neurology

## 2023-05-21 ENCOUNTER — DOCUMENTATION ONLY (OUTPATIENT)
Dept: NEUROLOGY | Facility: CLINIC | Age: 43
End: 2023-05-21

## 2023-05-21 PROBLEM — E87.20 METABOLIC ACIDOSIS: Status: RESOLVED | Noted: 2023-05-18 | Resolved: 2023-05-21

## 2023-05-21 LAB
ALBUMIN SERPL BCP-MCNC: 3.4 G/DL (ref 3.5–5.2)
ALP SERPL-CCNC: 68 U/L (ref 55–135)
ALT SERPL W/O P-5'-P-CCNC: 17 U/L (ref 10–44)
ANION GAP SERPL CALC-SCNC: 8 MMOL/L (ref 8–16)
AST SERPL-CCNC: 16 U/L (ref 10–40)
BASOPHILS # BLD AUTO: 0.02 K/UL (ref 0–0.2)
BASOPHILS NFR BLD: 0.2 % (ref 0–1.9)
BILIRUB SERPL-MCNC: 0.6 MG/DL (ref 0.1–1)
BUN SERPL-MCNC: 14 MG/DL (ref 6–20)
CALCIUM SERPL-MCNC: 8 MG/DL (ref 8.7–10.5)
CHLORIDE SERPL-SCNC: 111 MMOL/L (ref 95–110)
CLARITY CSF: CLEAR
CO2 SERPL-SCNC: 23 MMOL/L (ref 23–29)
COLOR CSF: COLORLESS
CREAT SERPL-MCNC: 1.3 MG/DL (ref 0.5–1.4)
CSF TUBE NUMBER: 2
CSF TUBE NUMBER: 2
DIFFERENTIAL METHOD: ABNORMAL
EOSINOPHIL # BLD AUTO: 0 K/UL (ref 0–0.5)
EOSINOPHIL NFR BLD: 0 % (ref 0–8)
ERYTHROCYTE [DISTWIDTH] IN BLOOD BY AUTOMATED COUNT: 14.7 % (ref 11.5–14.5)
EST. GFR  (NO RACE VARIABLE): >60 ML/MIN/1.73 M^2
EV RNA SPEC QL NAA+PROBE: NEGATIVE
GLUCOSE CSF-MCNC: 68 MG/DL (ref 40–70)
GLUCOSE SERPL-MCNC: 109 MG/DL (ref 70–110)
HCT VFR BLD AUTO: 41 % (ref 40–54)
HGB BLD-MCNC: 12.7 G/DL (ref 14–18)
IMM GRANULOCYTES # BLD AUTO: 0.03 K/UL (ref 0–0.04)
IMM GRANULOCYTES NFR BLD AUTO: 0.3 % (ref 0–0.5)
LYMPHOCYTES # BLD AUTO: 1.3 K/UL (ref 1–4.8)
LYMPHOCYTES NFR BLD: 12.8 % (ref 18–48)
LYMPHOCYTES NFR CSF MANUAL: 42 % (ref 40–80)
MAGNESIUM SERPL-MCNC: 2.2 MG/DL (ref 1.6–2.6)
MCH RBC QN AUTO: 24.9 PG (ref 27–31)
MCHC RBC AUTO-ENTMCNC: 31 G/DL (ref 32–36)
MCV RBC AUTO: 80 FL (ref 82–98)
MONOCYTES # BLD AUTO: 0.6 K/UL (ref 0.3–1)
MONOCYTES NFR BLD: 6 % (ref 4–15)
MONOS+MACROS NFR CSF MANUAL: 42 % (ref 15–45)
NEUTROPHILS # BLD AUTO: 8.1 K/UL (ref 1.8–7.7)
NEUTROPHILS NFR BLD: 80.7 % (ref 38–73)
NEUTROPHILS NFR CSF MANUAL: 16 % (ref 0–6)
NRBC BLD-RTO: 0 /100 WBC
PHOSPHATE SERPL-MCNC: 2.5 MG/DL (ref 2.7–4.5)
PLATELET # BLD AUTO: 231 K/UL (ref 150–450)
PMV BLD AUTO: 11.2 FL (ref 9.2–12.9)
POTASSIUM SERPL-SCNC: 3.2 MMOL/L (ref 3.5–5.1)
PROT CSF-MCNC: 27 MG/DL (ref 15–40)
PROT SERPL-MCNC: 7.1 G/DL (ref 6–8.4)
RBC # BLD AUTO: 5.1 M/UL (ref 4.6–6.2)
RBC # CSF: 25 /CU MM
SODIUM SERPL-SCNC: 142 MMOL/L (ref 136–145)
SPECIMEN SOURCE: NORMAL
SPECIMEN VOL CSF: 4 ML
WBC # BLD AUTO: 10.05 K/UL (ref 3.9–12.7)
WBC # CSF: 2 /CU MM (ref 0–5)

## 2023-05-21 PROCEDURE — 86403 PARTICLE AGGLUT ANTBDY SCRN: CPT | Performed by: STUDENT IN AN ORGANIZED HEALTH CARE EDUCATION/TRAINING PROGRAM

## 2023-05-21 PROCEDURE — 83735 ASSAY OF MAGNESIUM: CPT

## 2023-05-21 PROCEDURE — 85025 COMPLETE CBC W/AUTO DIFF WBC: CPT

## 2023-05-21 PROCEDURE — 84100 ASSAY OF PHOSPHORUS: CPT

## 2023-05-21 PROCEDURE — 86255 FLUORESCENT ANTIBODY SCREEN: CPT | Mod: 59 | Performed by: STUDENT IN AN ORGANIZED HEALTH CARE EDUCATION/TRAINING PROGRAM

## 2023-05-21 PROCEDURE — 89051 BODY FLUID CELL COUNT: CPT | Performed by: STUDENT IN AN ORGANIZED HEALTH CARE EDUCATION/TRAINING PROGRAM

## 2023-05-21 PROCEDURE — 87529 HSV DNA AMP PROBE: CPT | Performed by: STUDENT IN AN ORGANIZED HEALTH CARE EDUCATION/TRAINING PROGRAM

## 2023-05-21 PROCEDURE — 87116 MYCOBACTERIA CULTURE: CPT | Performed by: STUDENT IN AN ORGANIZED HEALTH CARE EDUCATION/TRAINING PROGRAM

## 2023-05-21 PROCEDURE — 25000003 PHARM REV CODE 250

## 2023-05-21 PROCEDURE — 87102 FUNGUS ISOLATION CULTURE: CPT | Performed by: STUDENT IN AN ORGANIZED HEALTH CARE EDUCATION/TRAINING PROGRAM

## 2023-05-21 PROCEDURE — 87498 ENTEROVIRUS PROBE&REVRS TRNS: CPT | Performed by: STUDENT IN AN ORGANIZED HEALTH CARE EDUCATION/TRAINING PROGRAM

## 2023-05-21 PROCEDURE — 87070 CULTURE OTHR SPECIMN AEROBIC: CPT | Performed by: STUDENT IN AN ORGANIZED HEALTH CARE EDUCATION/TRAINING PROGRAM

## 2023-05-21 PROCEDURE — 87798 DETECT AGENT NOS DNA AMP: CPT | Mod: 59 | Performed by: STUDENT IN AN ORGANIZED HEALTH CARE EDUCATION/TRAINING PROGRAM

## 2023-05-21 PROCEDURE — 86255 FLUORESCENT ANTIBODY SCREEN: CPT | Mod: 59 | Performed by: INTERNAL MEDICINE

## 2023-05-21 PROCEDURE — 36415 COLL VENOUS BLD VENIPUNCTURE: CPT

## 2023-05-21 PROCEDURE — 95714 VEEG EA 12-26 HR UNMNTR: CPT

## 2023-05-21 PROCEDURE — 87040 BLOOD CULTURE FOR BACTERIA: CPT | Performed by: INTERNAL MEDICINE

## 2023-05-21 PROCEDURE — 99232 PR SUBSEQUENT HOSPITAL CARE,LEVL II: ICD-10-PCS | Mod: 25,,, | Performed by: STUDENT IN AN ORGANIZED HEALTH CARE EDUCATION/TRAINING PROGRAM

## 2023-05-21 PROCEDURE — 84157 ASSAY OF PROTEIN OTHER: CPT | Performed by: STUDENT IN AN ORGANIZED HEALTH CARE EDUCATION/TRAINING PROGRAM

## 2023-05-21 PROCEDURE — 83521 IG LIGHT CHAINS FREE EACH: CPT | Performed by: STUDENT IN AN ORGANIZED HEALTH CARE EDUCATION/TRAINING PROGRAM

## 2023-05-21 PROCEDURE — 62270 LUMBAR PUNCTURE: ICD-10-PCS | Mod: ,,, | Performed by: STUDENT IN AN ORGANIZED HEALTH CARE EDUCATION/TRAINING PROGRAM

## 2023-05-21 PROCEDURE — 87205 SMEAR GRAM STAIN: CPT | Performed by: STUDENT IN AN ORGANIZED HEALTH CARE EDUCATION/TRAINING PROGRAM

## 2023-05-21 PROCEDURE — 87798 DETECT AGENT NOS DNA AMP: CPT | Performed by: INTERNAL MEDICINE

## 2023-05-21 PROCEDURE — 94761 N-INVAS EAR/PLS OXIMETRY MLT: CPT

## 2023-05-21 PROCEDURE — 99000 SPECIMEN HANDLING OFFICE-LAB: CPT | Performed by: STUDENT IN AN ORGANIZED HEALTH CARE EDUCATION/TRAINING PROGRAM

## 2023-05-21 PROCEDURE — 99232 SBSQ HOSP IP/OBS MODERATE 35: CPT | Mod: 25,,, | Performed by: STUDENT IN AN ORGANIZED HEALTH CARE EDUCATION/TRAINING PROGRAM

## 2023-05-21 PROCEDURE — 86053 AQAPRN-4 ANTB FLO CYTMTRY EA: CPT | Performed by: STUDENT IN AN ORGANIZED HEALTH CARE EDUCATION/TRAINING PROGRAM

## 2023-05-21 PROCEDURE — 87206 SMEAR FLUORESCENT/ACID STAI: CPT | Performed by: STUDENT IN AN ORGANIZED HEALTH CARE EDUCATION/TRAINING PROGRAM

## 2023-05-21 PROCEDURE — 86592 SYPHILIS TEST NON-TREP QUAL: CPT | Performed by: STUDENT IN AN ORGANIZED HEALTH CARE EDUCATION/TRAINING PROGRAM

## 2023-05-21 PROCEDURE — 11000001 HC ACUTE MED/SURG PRIVATE ROOM

## 2023-05-21 PROCEDURE — 62270 DX LMBR SPI PNXR: CPT | Mod: ,,, | Performed by: STUDENT IN AN ORGANIZED HEALTH CARE EDUCATION/TRAINING PROGRAM

## 2023-05-21 PROCEDURE — 82164 ANGIOTENSIN I ENZYME TEST: CPT | Performed by: STUDENT IN AN ORGANIZED HEALTH CARE EDUCATION/TRAINING PROGRAM

## 2023-05-21 PROCEDURE — 80053 COMPREHEN METABOLIC PANEL: CPT

## 2023-05-21 PROCEDURE — 82945 GLUCOSE OTHER FLUID: CPT | Performed by: STUDENT IN AN ORGANIZED HEALTH CARE EDUCATION/TRAINING PROGRAM

## 2023-05-21 PROCEDURE — 63600175 PHARM REV CODE 636 W HCPCS: Mod: JZ,JG | Performed by: STUDENT IN AN ORGANIZED HEALTH CARE EDUCATION/TRAINING PROGRAM

## 2023-05-21 PROCEDURE — 36415 COLL VENOUS BLD VENIPUNCTURE: CPT | Performed by: INTERNAL MEDICINE

## 2023-05-21 PROCEDURE — 63600175 PHARM REV CODE 636 W HCPCS

## 2023-05-21 RX ORDER — LIDOCAINE HYDROCHLORIDE 10 MG/ML
10 INJECTION INFILTRATION; PERINEURAL
Status: DISCONTINUED | OUTPATIENT
Start: 2023-05-21 | End: 2023-05-24 | Stop reason: HOSPADM

## 2023-05-21 RX ADMIN — ACETAMINOPHEN 1000 MG: 500 TABLET ORAL at 02:05

## 2023-05-21 RX ADMIN — ONDANSETRON 4 MG: 4 TABLET ORAL at 09:05

## 2023-05-21 RX ADMIN — ENOXAPARIN SODIUM 40 MG: 40 INJECTION SUBCUTANEOUS at 05:05

## 2023-05-21 RX ADMIN — Medication 6 MG: at 09:05

## 2023-05-21 RX ADMIN — HUMAN IMMUNOGLOBULIN G 40 G: 40 LIQUID INTRAVENOUS at 05:05

## 2023-05-21 RX ADMIN — Medication 100 MG: at 09:05

## 2023-05-21 RX ADMIN — LEVETIRACETAM 1500 MG: 750 TABLET, FILM COATED ORAL at 09:05

## 2023-05-21 NOTE — PROGRESS NOTES
[facility] ([facility])  Neurology  Progress Note    Patient Name: Jayesh Rose  MRN: [id number]  Admission Date: 5/18/2023  Hospital Length of Stay: 3 days  Code Status: Full Code   Attending Provider: Ochoa Sullivan MD  Primary Care Physician: Du Shaw MD   Principal Problem:Seizure    HPI:   the patient is a 43 yo man. neurology initially cosulted for concerns of breakthrough seizures in the setting of signs and symptoms concerning for meningitis. He has a history of anxiety and possibly seizure disorder. Regarding his seizures. They are mostly GTC, per the patient's significant other and he follows at [facility]. He is on keppra 1 g bid. MRI in the past as well as EEG have been unremarkable, per the patient's significant other. He admitted to sometimes not take his keppra. He had multiple episodes today concerning for seizures. Besides this, he also complained of myalgias, neck pain. At arrival, he was found to be tachycardic. Have high WBC and high lactic acid. CT head showed no abnormalities. He was loaded with keppra and an LP was done given concerns for possible meningitis. our initial recommendations included continuing keppra 1 g bid and depending on LP results, let ID take over if infectious or call us back if non-specific and still symptomatic. LP results non specific but not concerning for infections. the patient is still having short term memory problems. the primary team got an MRI w wo c on 5/19 which showed difusse restriction of bilateral mesial temporal lobes. Neurology reconsulted       Subjective:     Interval History: EEG showed active seizure overnight. Loaded with keppra 2 g. Now on scheduled 1.5 bid. LP to happen today or tomorrow per primary team. Day 2 of IVIG     Current Neurological Medications: as below     Current Facility-Administered Medications   Medication Dose Route Frequency Provider Last Rate Last Admin    0.9%  NaCl infusion   Intravenous Continuous Te  MD Jose 75 mL/hr at 05/20/23 2102 New Bag at 05/20/23 2102    acetaminophen tablet 1,000 mg  1,000 mg Oral Q6H PRN Miles Beavers, DO   1,000 mg at 05/20/23 1615    dextrose 10% bolus 125 mL 125 mL  12.5 g Intravenous PRN Theodora Zhu MD        dextrose 10% bolus 250 mL 250 mL  25 g Intravenous PRN Theodora Zhu MD        enoxaparin injection 40 mg  40 mg Subcutaneous Daily Velasquez Mas, DO   40 mg at 05/20/23 1617    glucagon (human recombinant) injection 1 mg  1 mg Intramuscular PRN Velasquez Mas, DO        glucose chewable tablet 16 g  16 g Oral PRN Velasquez Mas, DO        glucose chewable tablet 24 g  24 g Oral PRN Velasquez Mas, DO        hydrOXYzine HCL tablet 25 mg  25 mg Oral TID PRN Elda Manzanares MD   25 mg at 05/19/23 2042    Immune Globulin G (IGG)-PRO-IGA 10 % injection (Privigen) 10 % injection 40 g  0.4 g/kg Intravenous Q24H Te Garcia MD   Stopped at 05/20/23 2055    levETIRAcetam tablet 1,500 mg  1,500 mg Oral Q12H Velasquez Mas, DO   1,500 mg at 05/21/23 0948    melatonin tablet 6 mg  6 mg Oral Nightly PRN Elda Manzanares MD   6 mg at 05/19/23 2042    naloxone 0.4 mg/mL injection 0.02 mg  0.02 mg Intravenous PRN Velasquez Mas, DO        ondansetron tablet 4 mg  4 mg Oral Q6H PRN Miles Beavers DO   4 mg at 05/21/23 0949    prochlorperazine tablet 5 mg  5 mg Oral QID PRN Velasquez Mas, DO   5 mg at 05/19/23 0951    sodium chloride 0.9% flush 10 mL  10 mL Intravenous Q12H PRN Velasquez Mas, DO        thiamine tablet 100 mg  100 mg Oral Daily Velasquez Mas, DO   100 mg at 05/21/23 0949       Review of Systems   Constitutional:  Positive for fatigue. Negative for chills, diaphoresis and fever.   HENT:  Negative for congestion and trouble swallowing.    Eyes:  Negative for redness and visual disturbance.   Respiratory:  Negative for cough.    Cardiovascular:  Negative for chest pain.   Gastrointestinal:  Positive for nausea and vomiting.   Genitourinary:  Negative for  difficulty urinating and dysuria.   Musculoskeletal:  Positive for back pain, myalgias (shoulders) and neck pain (acute on chronic).   Skin:  Negative for rash and wound.   Neurological:  Positive for headaches. Negative for dizziness, tremors, seizures, syncope, facial asymmetry, speech difficulty, weakness, light-headedness and numbness.   Psychiatric/Behavioral:  Positive for confusion and decreased concentration. Negative for agitation, behavioral problems, dysphoric mood, hallucinations, self-injury, sleep disturbance and suicidal ideas. The patient is nervous/anxious. The patient is not hyperactive.    Objective:     Vital Signs (Most Recent):  Temp: 99 °F (37.2 °C) (05/21/23 0500)  Pulse: (!) 51 (05/21/23 0500)  Resp: 16 (05/20/23 2057)  BP: 137/85 (05/21/23 0500)  SpO2: (!) 94 % (05/21/23 0500) Vital Signs (24h Range):  Temp:  [98.1 °F (36.7 °C)-100.4 °F (38 °C)] 99 °F (37.2 °C)  Pulse:  [51-85] 51  Resp:  [15-20] 16  SpO2:  [94 %-99 %] 94 %  BP: (130-146)/(78-90) 137/85     Weight: 99.8 kg (220 lb)  Body mass index is 29.84 kg/m².     Physical Exam  Constitutional:       General: He is not in acute distress.     Appearance: Normal appearance. He is not ill-appearing, toxic-appearing or diaphoretic.   HENT:      Head: Normocephalic and atraumatic.      Mouth/Throat:      Mouth: Mucous membranes are moist.      Tongue: Lesions present.   Eyes:      Extraocular Movements: Extraocular movements intact.      Conjunctiva/sclera: Conjunctivae normal.      Pupils: Pupils are equal, round, and reactive to light.   Cardiovascular:      Rate and Rhythm: Normal rate and regular rhythm.      Pulses: Normal pulses.      Heart sounds: No murmur heard.  Pulmonary:      Effort: Pulmonary effort is normal. No respiratory distress.      Breath sounds: Normal breath sounds. No wheezing, rhonchi or rales.   Abdominal:      General: Abdomen is flat. Bowel sounds are normal. There is no distension.      Palpations: Abdomen is  soft. There is no mass.   Musculoskeletal:      Right lower leg: No edema.      Left lower leg: No edema.   Skin:     General: Skin is warm and dry.      Capillary Refill: Capillary refill takes less than 2 seconds.      Findings: No bruising or rash.   Neurological:      Mental Status: He is alert.      Cranial Nerves: Cranial nerves 2-12 are intact.      Coordination: Finger-Nose-Finger Test normal.      Deep Tendon Reflexes:      Reflex Scores:       Tricep reflexes are 2+ on the right side and 2+ on the left side.       Bicep reflexes are 2+ on the right side and 2+ on the left side.       Brachioradialis reflexes are 2+ on the right side and 2+ on the left side.       Patellar reflexes are 2+ on the right side and 2+ on the left side.       Achilles reflexes are 2+ on the right side and 2+ on the left side.  Psychiatric:         Speech: Speech normal.        NEUROLOGICAL EXAMINATION:     MENTAL STATUS   Disoriented to time.   Attention: decreased. Concentration: decreased.   Speech: speech is normal   Level of consciousness: alert    CRANIAL NERVES   Cranial nerves II through XII intact.     CN II   Visual fields full to confrontation.     CN III, IV, VI   Pupils are equal, round, and reactive to light.    CN V   Facial sensation intact.     CN VII   Facial expression full, symmetric.     CN VIII   CN VIII normal.     CN IX, X   CN IX normal.   CN X normal.     CN XI   CN XI normal.     CN XII   CN XII normal.     MOTOR EXAM   Muscle bulk: normal  Overall muscle tone: normal    Strength   Right neck flexion: 5/5  Left neck flexion: 5/5  Right neck extension: 5/5  Left neck extension: 5/5  Right deltoid: 5/5  Left deltoid: 5/5  Right biceps: 5/5  Left biceps: 5/5  Right triceps: 5/5  Left triceps: 5/5  Right wrist flexion: 5/5  Left wrist flexion: 5/5  Right wrist extension: 5/5  Left wrist extension: 5/5  Right interossei: 5/5  Left interossei: 5/5  Right abdominals: 5/5  Left abdominals: 5/5  Right iliopsoas:  5/5  Left iliopsoas: 5/5  Right quadriceps: 5/5  Left quadriceps: 5/5  Right hamstrin/5  Left hamstrin/5  Right glutei: 5/5  Left glutei: 5/5  Right anterior tibial: 5/5  Left anterior tibial: 5/5  Right posterior tibial: 5/5  Left posterior tibial: 5/5  Right peroneal: 5/5  Left peroneal: 5/5  Right gastroc: 5/5  Left gastroc: 5/5    REFLEXES     Reflexes   Right brachioradialis: 2+  Left brachioradialis: 2+  Right biceps: 2+  Left biceps: 2+  Right triceps: 2+  Left triceps: 2+  Right patellar: 2+  Left patellar: 2+  Right achilles: 2+  Left achilles: 2+  Right : 2+  Left : 2+    SENSORY EXAM   Right arm light touch: normal  Left arm light touch: normal  Right leg light touch: normal  Left leg light touch: normal    GAIT AND COORDINATION      Coordination   Finger to nose coordination: normal    Tremor   Resting tremor: absent    Significant Labs:   Blood Culture:   No results for input(s): LABBLOO in the last 48 hours.    BMP:   Recent Labs   Lab 23  16323  0355   * 113* 109    144 142   K 3.9 2.9* 3.2*    113* 111*   CO2 24 * 23   BUN 15 12 14   CREATININE 1.6* 1.2 1.3   CALCIUM 8.7 8.3* 8.0*   MG 2.6  --  2.2     CBC:   Recent Labs   Lab 23  1149 23  0355   WBC 22.32* 17.10* 10.05   HGB 12.5* 13.0* 12.7*   HCT 40.1 40.0 41.0    256 231     CMP:   Recent Labs   Lab 23  1632 23  0355   * 113* 109    144 142   K 3.9 2.9* 3.2*    113* 111*   CO2 24 22* 23   BUN 15 12 14   CREATININE 1.6* 1.2 1.3   CALCIUM 8.7 8.3* 8.0*   MG 2.6  --  2.2   PROT 6.2  --  7.1   ALBUMIN 3.6  --  3.4*   BILITOT 0.4  --  0.6   ALKPHOS 67  --  68   AST 23  --  16   ALT 15  --  17   ANIONGAP 9 9 8     All pertinent lab results from the past 24 hours have been reviewed.    Significant Imaging:     MRI brain w wo contrast :        Symmetric FLAIR a diffusion restriction involving the bilateral  mesial temporal lobes.  No associated abnormal enhancement.  While findings can be seen in the reported postictal setting, distribution is suggestive of autoimmune encephalitis.  Infectious encephalitis felt less likely.  Consider further evaluation with CSF markers.    I have reviewed and interpreted all pertinent imaging results/findings within the past 24 hours.    Assessment and Plan:     Encephalitis  the patient is a 43 yo man. neurology initially cosulted for concerns of breakthrough seizures in the setting of signs and symptoms concerning for meningitis. He has a history of anxiety and possibly seizure disorder. Regarding his seizures. They are mostly GTC, per the patient's significant other and he follows at [facility]. He is on keppra 1 g bid. MRI in the past as well as EEG have been unremarkable, per the patient's significant other. He admitted to sometimes not take his keppra. He had multiple episodes today concerning for seizures. Besides this, he also complained of myalgias, neck pain. At arrival, he was found to be tachycardic. Have high WBC and high lactic acid. CT head showed no abnormalities. He was loaded with keppra and an LP was done given concerns for possible meningitis. our initial recommendations included continuing keppra 1 g bid and depending on LP results, let ID take over if infectious or call us back if non-specific and still symptomatic. LP results non specific but not concerning for infections. the patient is still having short term memory problems. the primary team got an MRI w wo c on 5/19 which showed difusse restriction of bilateral mesial temporal lobes. 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 No obvious clinical seizures since admission, on keppra. LP results not concerning for infectious ethology. Case reviewed with ID who stated this presentation is not  concerning for infection at this time. Given location and history, this would favor autoimmune as the most likely ethology. Will plan to repeat LP focusing on autoimmune pathology and will plan to start IVIG    - Continue EEG monitoring   - Continue keppra 1.5 g bid. Loaded with 2 g on 5/10 after EEG review.   - Avoid common inpatient medications that lower seizure threshold like benadryl, toradol, baclofen.   - Seizure precautions   - LP planned for 5/21-5/22 per primary team.   - IVIG for 5 days. Dynamic approach, will monitor response closely.             VTE Risk Mitigation (From admission, onward)         Ordered     enoxaparin injection 40 mg  Daily         05/18/23 1744     IP VTE HIGH RISK PATIENT  Once         05/18/23 1744     Place sequential compression device  Until discontinued         05/18/23 1744                Te Garcia MD  Neurology  [facility] - Neurosurgery ([facility])

## 2023-05-21 NOTE — PROCEDURES
VIDEO ELECTROENCEPHALOGRAM  REPORT    DATE OF SERVICE: 5/20/23-5/21/23  EEG NUMBER: [id number]  REQUESTED BY:  Dr Zhu  LOCATION OF SERVICE:  [facility]   Electroencephalographic (EEG) recording is with electrodes placed according to the International 10-20 placement system.  Thirty two (32) channels of digital signal (sampling rate of 512/sec) including T1 and T2 was simultaneously recorded from the scalp and may include  EKG, EMG, and/or eye monitors.  Recording band pass was 0.1 to 512 hz.  Digital video recording of the patient is simultaneously recorded with the EEG.  The patient is instructed report clinical symptoms which may occur during the recording session.  EEG and video recording is stored and archived in digital format.  Activation procedures which include photic stimulation, hyperventilation and instructing patients to perform simple task are done in selected patients.   The EEG is displayed on a monitor screen and can be reviewed using different montages.  Computer assisted analysis is employed to detect spike and electrographic seizure activity.   The entire record is submitted for computer analysis.  The entire recording is visually reviewed and the times identified by computer analysis as being spikes or seizures are reviewed again.  Compresses spectral analysis (CSA) is also performed on the activity recorded from each individual channel.  This is displayed as a power display of frequencies from 0 to 30 Hz over time.   The CSA is reviewed looking for asymmetries in power between homologous areas of the scalp and then compared with the original EEG recording.     [employer] software was also utilized in the review of this study.  This software suite analyzes the EEG recording in multiple domains.  Coherence and rhythmicity is computed to identify EEG sections which may contain organized seizures.  Each channel undergoes analysis to detect presence of spike and sharp waves which have  special and morphological characteristic of epileptic activity.  The routine EEG recording is converted from spacial into frequency domain.  This is then displayed comparing homologous areas to identify areas of significant asymmetry.  Algorithm to identify non-cortically generated artifact is used to separate eye movement, EMG and other artifact from the EEG.      ELECTROENCEPHALOGRAM:    DAY 1:  RECORDING TIMES:  Start on 5/20/23 at 15:04  Stop on 5/20/23 at 17:02 - 2 hrs 16  Start on 5/20/23 at 17:31  Stop on 5/21/23 at 07:00 - 13 hours 29 minutes    A total of 15 hrs and 45 min of EEG recording was obtained.    Indication: 43 yo male with possible autoimmune encephalitis, currently maintained on Keppra.     State of Consciousness:   Awake and asleep    Background:   The background is  mildly disorganized , symmetric and continuous.  At maximum alertness there is a 9 hz PDR present that is symmetric and reactive to eye opening and closure.  There is intermittent theta and delta range slowing during the alert state.     Sleep:   Transition to sleep with symmetric vertex waves and sleep spindles is noted    Non-epileptiform Abnormalities:  Intermittent slow, generalized    Epileptiform Abnormalities:   - Electrographic seizure occurs on 5/20/23   EEG seizure pattern: rhythmic delta activity maximum left temporal that evolves into paroxysmal alpha activity.  Seizure is brief (about 40 seconds) and without clinical signs.  - At times there is rhythmic slowing most prominent in the temporal regions without clear evolution      EKG:   Regular rate and rhythm on single lead EKG    Activating procedures:   - Hyperventilation and photic not performed      Impression:   Abnormal awake and sleep EEG due to one electrographic seizure arising from the left temporal lobe.  There is additionally evidence of a mild diffuse encephalopathy.      Jackelyn Ordonez MD  [facility]   Department of Neurology

## 2023-05-21 NOTE — PROCEDURES
Jayesh Rose is a 42 y.o. male patient.    Temp: 98.8 °F (37.1 °C) (05/21/23 1140)  Pulse: (!) 50 (05/21/23 1201)  Resp: (!) 25 (05/21/23 1201)  BP: 123/77 (05/21/23 1201)  SpO2: (!) 92 % (05/21/23 1201)  Weight: 99.8 kg (220 lb) (05/18/23 2315)  Height: 6' (182.9 cm) (05/18/23 2315)       Lumbar Puncture    Date/Time: 5/21/2023 1:50 PM  Location procedure was performed: [facility] MEDICINE  Performed by: Jose Frankel MD  Authorized by: Jose Frankel MD   Assisting provider: Ochoa Sullivan MD  Pre-operative diagnosis:  Encephalitis  Post-operative diagnosis: encephalitis  Consent Done: Yes  Indications: evaluation for altered mental status and evaluation for infection  Anesthesia: local infiltration    Anesthesia:  Local Anesthetic: lidocaine 1% without epinephrine  Anesthetic total: 5 mL    Patient sedated: no  Description of findings: clear CSF   Preparation: Patient was prepped and draped in the usual sterile fashion.  Lumbar space: L4-L5 interspace  Patient's position: sitting  Needle gauge: 18  Needle type: spinal needle - Quincke tip  Needle length: 5.0 in  Number of attempts: 1  Fluid appearance: clear  Tubes of fluid: 4  Total volume: 20 ml  Post-procedure: site cleaned and adhesive bandage applied  Complications: No  Estimated blood loss (mL): 2  Specimens: No  Implants: No  Patient tolerance: Patient tolerated the procedure well with no immediate complications        5/21/2023

## 2023-05-21 NOTE — ASSESSMENT & PLAN NOTE
Complains that some of his symptoms have felt like panic attacks.   -Holding SSRI to limit psychoactive medications  -Avoid seizure theshold lowering medications

## 2023-05-21 NOTE — PROGRESS NOTES
"[facility] - Neurosurgery ([facility])  [facility] Medicine  Progress Note    Patient Name: Jayesh Rose  MRN: [id number]  Patient Class: IP- Inpatient   Admission Date: 5/18/2023  Length of Stay: 3 days  Attending Physician: Ochoa Sullivan MD  Primary Care Provider: Du Sahw MD        Subjective:     Principal Problem:Encephalitis        HPI:  Patient is a 41 yo male with a PMH of anxiety, GERD, and seizure disorder on Keppra who presents to [facility] with altered mental status per wife since waking up the morning of admission. She reports that he has had confusion, retrograde amnesia, and abnormal behavior since this morning. She left for work and received odd messages from him that did not make since so she returned home and found that his confusion and amnesia were worse and he was frothing from the mouth. She states that he was out drinking at a bar with a friend the night before, something he commonly dose, and came back around midnight and seemed in usual state of health. She asked the friend today and friend states there was nothing unusual about their outing. He has a history of tonic clonic seizures that he has been free from for the past several months although last year had to increase the dose of his keppra for breakthrough seizures. His seizures are typically preceded by a prodrome that the patient can feel coming on and followed by a short post ictal state that may involve some confusion, although wife states this current episode is nothing like seizures he has had in the past. He takes his seizure medication daily but often not twice a day as prescribed. Patient is alert with very poor short term memory. He does expresses recent headaches that he is unable to say are different or the same as headaches that he often gets and pain in his neck, back, and hips that is acute and chronic. He repeatedly asks the same questions such as "Why am I here?". He had an episode of vomiting earlier in the morning. " He denies chest pain, shortness of breath, vision changes, or dizziness. He denies sick contacts, any recent travel, recent trauma, and they do not own pets.     He has no smoking history, he does admit to occasional binge drinking of around maybe 8 drinks every once in a while and admits to occasional marijuana use last used 2 weeks ago but no other illicit drug use. He lives with his wife and their young child who have had no abnormal symptoms.    ED course:Vitals stable with high BP and pulse. WBC 25.5. Lactate 6.0. Neurology consulted and LP performed for concern of possible meningitis. CT head unremarkable.       Overview/Hospital Course:  Patient with history of seizures on keppra presented with altered mental status and confusion with short term memory loss after potential seizure activity. Has other symptoms including nausea, diaphoresis, and fever. Admitted for concern of seizure activity with potential meningitis. LP performed but was not suspicious for meningitis. Neurology consulted and MRI brain obtained. MRI brain showed evidence of diffuse restriction in bilateral temporal lobes suspicious for autoimmune encephalitis but can also be seen in postictal encephalitics. ID states low suspicion for infectious etiology. Antimicrobials discontinued and leukocytosis continued to resolve. IVIG started on 5/20 with plans for 5 day course for dynamic approach. Repeat LP pending. EEG from 5/20 showing one L temporal seizure and ictal rhythmic changes so pt loaded with 2g Keppra by neurology.       Interval History: Overnight patient received atarax and melatonin. Today, patient's status is unchanged. MRI brain reveals diffuse restriction suspicious for autoimmune encephalitis. Neurology states that additional LP will likely need to be performed.    Review of Systems   Reason unable to perform ROS: supplemented by wife, AMS.   Constitutional:  Negative for chills and fever.   HENT:  Negative for congestion and  trouble swallowing.    Eyes:  Negative for redness and visual disturbance.   Respiratory:  Negative for cough.    Cardiovascular:  Negative for chest pain and leg swelling.   Gastrointestinal:  Positive for nausea. Negative for abdominal pain, constipation and diarrhea.   Genitourinary:  Negative for difficulty urinating and dysuria.   Musculoskeletal:  Positive for back pain (chronic) and neck pain (chronic).   Skin:  Negative for rash and wound.   Neurological:  Negative for dizziness and headaches.   Psychiatric/Behavioral:  Positive for confusion and decreased concentration. The patient is nervous/anxious.    Objective:     Vital Signs (Most Recent):  Temp: 98.8 °F (37.1 °C) (05/21/23 1140)  Pulse: (!) 50 (05/21/23 1201)  Resp: (!) 25 (05/21/23 1201)  BP: 123/77 (05/21/23 1201)  SpO2: (!) 92 % (05/21/23 1201) Vital Signs (24h Range):  Temp:  [97.7 °F (36.5 °C)-100.4 °F (38 °C)] 98.8 °F (37.1 °C)  Pulse:  [50-82] 50  Resp:  [15-25] 25  SpO2:  [92 %-99 %] 92 %  BP: (121-146)/(76-90) 123/77     Weight: 99.8 kg (220 lb)  Body mass index is 29.84 kg/m².  No intake or output data in the 24 hours ending 05/21/23 1245        Physical Exam  Constitutional:       General: He is not in acute distress.     Appearance: Normal appearance. He is not ill-appearing, toxic-appearing or diaphoretic.   HENT:      Head: Normocephalic and atraumatic.      Mouth/Throat:      Mouth: Mucous membranes are moist.      Tongue: Lesions present.   Eyes:      Extraocular Movements: Extraocular movements intact.      Conjunctiva/sclera: Conjunctivae normal.      Pupils: Pupils are equal, round, and reactive to light.   Cardiovascular:      Rate and Rhythm: Normal rate and regular rhythm.      Pulses: Normal pulses.      Heart sounds: No murmur heard.  Pulmonary:      Effort: Pulmonary effort is normal. No respiratory distress.      Breath sounds: Normal breath sounds. No wheezing, rhonchi or rales.   Abdominal:      General: Abdomen is flat.  Bowel sounds are normal. There is no distension.      Palpations: Abdomen is soft. There is no mass.   Musculoskeletal:      Cervical back: Neck supple. No rigidity.      Right lower leg: No edema.      Left lower leg: No edema.   Skin:     General: Skin is warm and dry.      Capillary Refill: Capillary refill takes less than 2 seconds.      Findings: No bruising or rash.   Neurological:      Mental Status: He is alert. He is disoriented.      Comments: Oriented to person and place. Disoriented to time.    Asks repeated questions with poor short term memory.    Occasionally becomes anxious with diaphoresis   Psychiatric:      Comments: Anxious about health.           Significant Labs: All pertinent labs within the past 24 hours have been reviewed.  CBC:   Recent Labs   Lab 05/20/23  0313 05/21/23  0355   WBC 17.10* 10.05   HGB 13.0* 12.7*   HCT 40.0 41.0    231       CMP:   Recent Labs   Lab 05/20/23  0313 05/20/23  1632 05/21/23  0355    144 142   K 3.9 2.9* 3.2*    113* 111*   CO2 24 22* 23   * 113* 109   BUN 15 12 14   CREATININE 1.6* 1.2 1.3   CALCIUM 8.7 8.3* 8.0*   PROT 6.2  --  7.1   ALBUMIN 3.6  --  3.4*   BILITOT 0.4  --  0.6   ALKPHOS 67  --  68   AST 23  --  16   ALT 15  --  17   ANIONGAP 9 9 8         Significant Imaging: I have reviewed all pertinent imaging results/findings within the past 24 hours.      Assessment/Plan:      * Encephalitis  See seizure disorder.       Anxiety  Complains that some of his symptoms have felt like panic attacks.   -Holding SSRI to limit psychoactive medications  -Avoid seizure theshold lowering medications      Encephalopathy, metabolic  Seizure vs encephalitis vs meningitis vs intracranial hemorrhage vs toxic encephalopathy with possible intoxification .  -See seizure for plan.      Seizure  Patient admitted with seizure like activity with prolonged post ictal period vs concern for menengitis as seizure is unlike previous episodes he has had  before.Wife states he is inconsistent with prior seizures that he has had. Vitals stable. Physical exam relatively benign. WBC 25.5. Lactate 6.0. Lumbar puncture performed and initial labs positive for slightly elevated glucose and protein but normal WBC not suspicious for meningitis. CT unremarkable.       Differential includes seizure with post ictal state vs encephalitis vs autoimmune or oncologic pathology.    WBC and lactate improving. UDS negative. MRI brain suspicious for autoimmune vs postictal encephalitis.     Plan:   -Continuing keppra 1g BID  -Neurology consulted, recs appreciated.   - Bedside LP attempt today for additional CSF work-up  - Seizure precautions in place   - F/u CSF and blood cultures  - extended EEG to check seizure activity, sz noted on 5/20 and loaded with 2g Keppra  -Thiamine supplementation        Class 1 obesity in adult  Body mass index is 29.84 kg/m². Morbid obesity complicates all aspects of disease management from diagnostic modalities to treatment. Weight loss encouraged and health benefits explained to patient.         DDD (degenerative disc disease), lumbar  Likely contributing to current back pain.      GERD (gastroesophageal reflux disease)  Stable.  -Holding PPI inpatient         VTE Risk Mitigation (From admission, onward)         Ordered     enoxaparin injection 40 mg  Daily         05/18/23 1744     IP VTE HIGH RISK PATIENT  Once         05/18/23 1744     Place sequential compression device  Until discontinued         05/18/23 1744                Discharge Planning   ALEJA: 5/21/2023     Code Status: Full Code   Is the patient medically ready for discharge?: No    Reason for patient still in hospital (select all that apply): Patient trending condition, Laboratory test, Treatment, Consult recommendations and Pending disposition                     Shanice Wilkes MD  Department of Hospital Medicine   [facility] - Neurosurgery ([facility])

## 2023-05-21 NOTE — SUBJECTIVE & OBJECTIVE
Subjective:     Interval History: EEG showed active seizure overnight. Loaded with keppra 2 g. Now on scheduled 1.5 bid. LP to happen today or tomorrow per primary team. Day 2 of IVIG     Current Neurological Medications: as below     Current Facility-Administered Medications   Medication Dose Route Frequency Provider Last Rate Last Admin    0.9%  NaCl infusion   Intravenous Continuous Te Garcia MD 75 mL/hr at 05/20/23 2102 New Bag at 05/20/23 2102    acetaminophen tablet 1,000 mg  1,000 mg Oral Q6H PRN Miles Beavers DO   1,000 mg at 05/20/23 1615    dextrose 10% bolus 125 mL 125 mL  12.5 g Intravenous PRN Theodora Zhu MD        dextrose 10% bolus 250 mL 250 mL  25 g Intravenous PRN Theodora Zhu MD        enoxaparin injection 40 mg  40 mg Subcutaneous Daily Velasquez Mas, DO   40 mg at 05/20/23 1617    glucagon (human recombinant) injection 1 mg  1 mg Intramuscular PRN Velasquez Mas, DO        glucose chewable tablet 16 g  16 g Oral PRN Velasquez Mas DO        glucose chewable tablet 24 g  24 g Oral PRN Velasquez Mas DO        hydrOXYzine HCL tablet 25 mg  25 mg Oral TID PRN Elda Manzanares MD   25 mg at 05/19/23 2042    Immune Globulin G (IGG)-PRO-IGA 10 % injection (Privigen) 10 % injection 40 g  0.4 g/kg Intravenous Q24H Te Garcia MD   Stopped at 05/20/23 2055    levETIRAcetam tablet 1,500 mg  1,500 mg Oral Q12H Velasquez Mas, DO   1,500 mg at 05/21/23 0948    melatonin tablet 6 mg  6 mg Oral Nightly PRN Elda Manzanares MD   6 mg at 05/19/23 2042    naloxone 0.4 mg/mL injection 0.02 mg  0.02 mg Intravenous PRN Velasquez Mas DO        ondansetron tablet 4 mg  4 mg Oral Q6H PRN Miles Beavers DO   4 mg at 05/21/23 0949    prochlorperazine tablet 5 mg  5 mg Oral QID PRN Velasquez Mas, DO   5 mg at 05/19/23 0951    sodium chloride 0.9% flush 10 mL  10 mL Intravenous Q12H PRN Velasquez Mas DO        thiamine tablet 100 mg  100 mg Oral Daily Velasquez Mas DO   100 mg at 05/21/23 0949        Review of Systems   Constitutional:  Positive for fatigue. Negative for chills, diaphoresis and fever.   HENT:  Negative for congestion and trouble swallowing.    Eyes:  Negative for redness and visual disturbance.   Respiratory:  Negative for cough.    Cardiovascular:  Negative for chest pain.   Gastrointestinal:  Positive for nausea and vomiting.   Genitourinary:  Negative for difficulty urinating and dysuria.   Musculoskeletal:  Positive for back pain, myalgias (shoulders) and neck pain (acute on chronic).   Skin:  Negative for rash and wound.   Neurological:  Positive for headaches. Negative for dizziness, tremors, seizures, syncope, facial asymmetry, speech difficulty, weakness, light-headedness and numbness.   Psychiatric/Behavioral:  Positive for confusion and decreased concentration. Negative for agitation, behavioral problems, dysphoric mood, hallucinations, self-injury, sleep disturbance and suicidal ideas. The patient is nervous/anxious. The patient is not hyperactive.    Objective:     Vital Signs (Most Recent):  Temp: 99 °F (37.2 °C) (05/21/23 0500)  Pulse: (!) 51 (05/21/23 0500)  Resp: 16 (05/20/23 2057)  BP: 137/85 (05/21/23 0500)  SpO2: (!) 94 % (05/21/23 0500) Vital Signs (24h Range):  Temp:  [98.1 °F (36.7 °C)-100.4 °F (38 °C)] 99 °F (37.2 °C)  Pulse:  [51-85] 51  Resp:  [15-20] 16  SpO2:  [94 %-99 %] 94 %  BP: (130-146)/(78-90) 137/85     Weight: 99.8 kg (220 lb)  Body mass index is 29.84 kg/m².     Physical Exam  Constitutional:       General: He is not in acute distress.     Appearance: Normal appearance. He is not ill-appearing, toxic-appearing or diaphoretic.   HENT:      Head: Normocephalic and atraumatic.      Mouth/Throat:      Mouth: Mucous membranes are moist.      Tongue: Lesions present.   Eyes:      Extraocular Movements: Extraocular movements intact.      Conjunctiva/sclera: Conjunctivae normal.      Pupils: Pupils are equal, round, and reactive to light.   Cardiovascular:       Rate and Rhythm: Normal rate and regular rhythm.      Pulses: Normal pulses.      Heart sounds: No murmur heard.  Pulmonary:      Effort: Pulmonary effort is normal. No respiratory distress.      Breath sounds: Normal breath sounds. No wheezing, rhonchi or rales.   Abdominal:      General: Abdomen is flat. Bowel sounds are normal. There is no distension.      Palpations: Abdomen is soft. There is no mass.   Musculoskeletal:      Right lower leg: No edema.      Left lower leg: No edema.   Skin:     General: Skin is warm and dry.      Capillary Refill: Capillary refill takes less than 2 seconds.      Findings: No bruising or rash.   Neurological:      Mental Status: He is alert.      Cranial Nerves: Cranial nerves 2-12 are intact.      Coordination: Finger-Nose-Finger Test normal.      Deep Tendon Reflexes:      Reflex Scores:       Tricep reflexes are 2+ on the right side and 2+ on the left side.       Bicep reflexes are 2+ on the right side and 2+ on the left side.       Brachioradialis reflexes are 2+ on the right side and 2+ on the left side.       Patellar reflexes are 2+ on the right side and 2+ on the left side.       Achilles reflexes are 2+ on the right side and 2+ on the left side.  Psychiatric:         Speech: Speech normal.        NEUROLOGICAL EXAMINATION:     MENTAL STATUS   Disoriented to time.   Attention: decreased. Concentration: decreased.   Speech: speech is normal   Level of consciousness: alert    CRANIAL NERVES   Cranial nerves II through XII intact.     CN II   Visual fields full to confrontation.     CN III, IV, VI   Pupils are equal, round, and reactive to light.    CN V   Facial sensation intact.     CN VII   Facial expression full, symmetric.     CN VIII   CN VIII normal.     CN IX, X   CN IX normal.   CN X normal.     CN XI   CN XI normal.     CN XII   CN XII normal.     MOTOR EXAM   Muscle bulk: normal  Overall muscle tone: normal    Strength   Right neck flexion: 5/5  Left neck flexion:  5/5  Right neck extension: 5/5  Left neck extension: 5/  Right deltoid: 5/5  Left deltoid: 5/  Right biceps: 5  Left biceps: 5/  Right triceps: 5/5  Left triceps: 5/5  Right wrist flexion: 5/  Left wrist flexion: 5  Right wrist extension:   Left wrist extension:   Right interossei:   Left interossei:   Right abdominals:   Left abdominals:   Right iliopsoas:   Left iliopsoas:   Right quadriceps:   Left quadriceps:   Right hamstrin/5  Left hamstrin/5  Right glutei:   Left glutei:   Right anterior tibial:   Left anterior tibial:   Right posterior tibial:   Left posterior tibial:   Right peroneal:   Left peroneal:   Right gastroc:   Left gastroc:     REFLEXES     Reflexes   Right brachioradialis: 2+  Left brachioradialis: 2+  Right biceps: 2+  Left biceps: 2+  Right triceps: 2+  Left triceps: 2+  Right patellar: 2+  Left patellar: 2+  Right achilles: 2+  Left achilles: 2+  Right : 2+  Left : 2+    SENSORY EXAM   Right arm light touch: normal  Left arm light touch: normal  Right leg light touch: normal  Left leg light touch: normal    GAIT AND COORDINATION      Coordination   Finger to nose coordination: normal    Tremor   Resting tremor: absent    Significant Labs:   Blood Culture:   No results for input(s): LABBLOO in the last 48 hours.    BMP:   Recent Labs   Lab 233 23  1632 23  0355   * 113* 109    144 142   K 3.9 2.9* 3.2*    113* 111*   CO2 24 22* 23   BUN 15 12 14   CREATININE 1.6* 1.2 1.3   CALCIUM 8.7 8.3* 8.0*   MG 2.6  --  2.2     CBC:   Recent Labs   Lab 23  1149 23  0313 23  0355   WBC 22.32* 17.10* 10.05   HGB 12.5* 13.0* 12.7*   HCT 40.1 40.0 41.0    256 231     CMP:   Recent Labs   Lab 23  0313 23  1632 23  0355   * 113* 109    144 142   K 3.9 2.9* 3.2*    113* 111*   CO2 24 22* 23   BUN 15 12 14   CREATININE 1.6* 1.2  1.3   CALCIUM 8.7 8.3* 8.0*   MG 2.6  --  2.2   PROT 6.2  --  7.1   ALBUMIN 3.6  --  3.4*   BILITOT 0.4  --  0.6   ALKPHOS 67  --  68   AST 23  --  16   ALT 15  --  17   ANIONGAP 9 9 8     All pertinent lab results from the past 24 hours have been reviewed.    Significant Imaging:     MRI brain w wo contrast 5/20:        Symmetric FLAIR a diffusion restriction involving the bilateral mesial temporal lobes.  No associated abnormal enhancement.  While findings can be seen in the reported postictal setting, distribution is suggestive of autoimmune encephalitis.  Infectious encephalitis felt less likely.  Consider further evaluation with CSF markers.    I have reviewed and interpreted all pertinent imaging results/findings within the past 24 hours.

## 2023-05-21 NOTE — SUBJECTIVE & OBJECTIVE
Interval History: Overnight patient received atarax and melatonin. Today, patient's status is unchanged. MRI brain reveals diffuse restriction suspicious for autoimmune encephalitis. Neurology states that additional LP will likely need to be performed.    Review of Systems   Reason unable to perform ROS: supplemented by wife, CHRIS.   Constitutional:  Negative for chills and fever.   HENT:  Negative for congestion and trouble swallowing.    Eyes:  Negative for redness and visual disturbance.   Respiratory:  Negative for cough.    Cardiovascular:  Negative for chest pain and leg swelling.   Gastrointestinal:  Positive for nausea. Negative for abdominal pain, constipation and diarrhea.   Genitourinary:  Negative for difficulty urinating and dysuria.   Musculoskeletal:  Positive for back pain (chronic) and neck pain (chronic).   Skin:  Negative for rash and wound.   Neurological:  Negative for dizziness and headaches.   Psychiatric/Behavioral:  Positive for confusion and decreased concentration. The patient is nervous/anxious.    Objective:     Vital Signs (Most Recent):  Temp: 98.8 °F (37.1 °C) (05/21/23 1140)  Pulse: (!) 50 (05/21/23 1201)  Resp: (!) 25 (05/21/23 1201)  BP: 123/77 (05/21/23 1201)  SpO2: (!) 92 % (05/21/23 1201) Vital Signs (24h Range):  Temp:  [97.7 °F (36.5 °C)-100.4 °F (38 °C)] 98.8 °F (37.1 °C)  Pulse:  [50-82] 50  Resp:  [15-25] 25  SpO2:  [92 %-99 %] 92 %  BP: (121-146)/(76-90) 123/77     Weight: 99.8 kg (220 lb)  Body mass index is 29.84 kg/m².  No intake or output data in the 24 hours ending 05/21/23 1245        Physical Exam  Constitutional:       General: He is not in acute distress.     Appearance: Normal appearance. He is not ill-appearing, toxic-appearing or diaphoretic.   HENT:      Head: Normocephalic and atraumatic.      Mouth/Throat:      Mouth: Mucous membranes are moist.      Tongue: Lesions present.   Eyes:      Extraocular Movements: Extraocular movements intact.       Conjunctiva/sclera: Conjunctivae normal.      Pupils: Pupils are equal, round, and reactive to light.   Cardiovascular:      Rate and Rhythm: Normal rate and regular rhythm.      Pulses: Normal pulses.      Heart sounds: No murmur heard.  Pulmonary:      Effort: Pulmonary effort is normal. No respiratory distress.      Breath sounds: Normal breath sounds. No wheezing, rhonchi or rales.   Abdominal:      General: Abdomen is flat. Bowel sounds are normal. There is no distension.      Palpations: Abdomen is soft. There is no mass.   Musculoskeletal:      Cervical back: Neck supple. No rigidity.      Right lower leg: No edema.      Left lower leg: No edema.   Skin:     General: Skin is warm and dry.      Capillary Refill: Capillary refill takes less than 2 seconds.      Findings: No bruising or rash.   Neurological:      Mental Status: He is alert. He is disoriented.      Comments: Oriented to person and place. Disoriented to time.    Asks repeated questions with poor short term memory.    Occasionally becomes anxious with diaphoresis   Psychiatric:      Comments: Anxious about health.           Significant Labs: All pertinent labs within the past 24 hours have been reviewed.  CBC:   Recent Labs   Lab 05/20/23  0313 05/21/23  0355   WBC 17.10* 10.05   HGB 13.0* 12.7*   HCT 40.0 41.0    231       CMP:   Recent Labs   Lab 05/20/23  0313 05/20/23  1632 05/21/23  0355    144 142   K 3.9 2.9* 3.2*    113* 111*   CO2 24 22* 23   * 113* 109   BUN 15 12 14   CREATININE 1.6* 1.2 1.3   CALCIUM 8.7 8.3* 8.0*   PROT 6.2  --  7.1   ALBUMIN 3.6  --  3.4*   BILITOT 0.4  --  0.6   ALKPHOS 67  --  68   AST 23  --  16   ALT 15  --  17   ANIONGAP 9 9 8         Significant Imaging: I have reviewed all pertinent imaging results/findings within the past 24 hours.

## 2023-05-21 NOTE — PLAN OF CARE
Problem: Adult Inpatient Plan of Care  Goal: Plan of Care Review  Outcome: Ongoing, Progressing  Goal: Patient-Specific Goal (Individualized)  Outcome: Ongoing, Progressing  Goal: Absence of Hospital-Acquired Illness or Injury  Outcome: Ongoing, Progressing  Goal: Optimal Comfort and Wellbeing  Outcome: Ongoing, Progressing  Goal: Readiness for Transition of Care  Outcome: Ongoing, Progressing     Problem: Impaired Wound Healing  Goal: Optimal Wound Healing  Outcome: Ongoing, Progressing     Problem: Fall Injury Risk  Goal: Absence of Fall and Fall-Related Injury  Outcome: Ongoing, Progressing     Problem: Self-Care Deficit  Goal: Improved Ability to Complete Activities of Daily Living  Outcome: Ongoing, Progressing     Problem: Hypertension Acute  Goal: Blood Pressure Within Desired Range  Outcome: Ongoing, Progressing     Problem: Confusion Acute  Goal: Optimal Cognitive Function  Outcome: Ongoing, Progressing     Problem: Seizure, Active Management  Goal: Absence of Seizure/Seizure-Related Injury  Outcome: Ongoing, Progressing     Problem: Fever  Goal: Body Temperature in Desired Range  Outcome: Ongoing, Progressing    No seizure like activity noted this shift, EEG cap remains in place,T max was 100 degrees, no c/o pain, pt. continues to be forgetful/confused. Significant other is at the bedside.

## 2023-05-21 NOTE — ASSESSMENT & PLAN NOTE
the patient is a 43 yo man. neurology initially cosulted for concerns of breakthrough seizures in the setting of signs and symptoms concerning for meningitis. He has a history of anxiety and possibly seizure disorder. Regarding his seizures. They are mostly GTC, per the patient's significant other and he follows at [facility]. He is on keppra 1 g bid. MRI in the past as well as EEG have been unremarkable, per the patient's significant other. He admitted to sometimes not take his keppra. He had multiple episodes today concerning for seizures. Besides this, he also complained of myalgias, neck pain. At arrival, he was found to be tachycardic. Have high WBC and high lactic acid. CT head showed no abnormalities. He was loaded with keppra and an LP was done given concerns for possible meningitis. our initial recommendations included continuing keppra 1 g bid and depending on LP results, let ID take over if infectious or call us back if non-specific and still symptomatic. LP results non specific but not concerning for infections. the patient is still having short term memory problems. the primary team got an MRI w wo c on 5/19 which showed difusse restriction of bilateral mesial temporal lobes. 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 No obvious clinical seizures since admission, on keppra. LP results not concerning for infectious ethology. Case reviewed with ID who stated this presentation is not concerning for infection at this time. Given location and history, this would favor autoimmune as the most likely ethology. Will plan to repeat LP focusing on autoimmune pathology and will plan to start IVIG    - Continue EEG monitoring   - Continue keppra 1.5 g bid. Loaded with 2 g on 5/10 after EEG review.   - Avoid common inpatient medications that lower seizure threshold like benadryl, toradol, baclofen.    - Seizure precautions   - LP planned for 5/21-5/22 per primary team.   - IVIG for 5 days. Dynamic approach, will monitor response closely.

## 2023-05-21 NOTE — ASSESSMENT & PLAN NOTE
Patient admitted with seizure like activity with prolonged post ictal period vs concern for menengitis as seizure is unlike previous episodes he has had before.Wife states he is inconsistent with prior seizures that he has had. Vitals stable. Physical exam relatively benign. WBC 25.5. Lactate 6.0. Lumbar puncture performed and initial labs positive for slightly elevated glucose and protein but normal WBC not suspicious for meningitis. CT unremarkable.       Differential includes seizure with post ictal state vs encephalitis vs autoimmune or oncologic pathology.    WBC and lactate improving. UDS negative. MRI brain suspicious for autoimmune vs postictal encephalitis.     Plan:   -Continuing keppra 1g BID  -Neurology consulted, recs appreciated.   - Bedside LP attempt today for additional CSF work-up  - Seizure precautions in place   - F/u CSF and blood cultures  - extended EEG to check seizure activity, sz noted on 5/20 and loaded with 2g Keppra  -Thiamine supplementation

## 2023-05-22 PROBLEM — E66.811 CLASS 1 OBESITY IN ADULT: Status: RESOLVED | Noted: 2023-05-18 | Resolved: 2023-05-22

## 2023-05-22 PROBLEM — E66.9 CLASS 1 OBESITY IN ADULT: Status: RESOLVED | Noted: 2023-05-18 | Resolved: 2023-05-22

## 2023-05-22 LAB
ALBUMIN SERPL BCP-MCNC: 3.1 G/DL (ref 3.5–5.2)
ALP SERPL-CCNC: 54 U/L (ref 55–135)
ALT SERPL W/O P-5'-P-CCNC: 15 U/L (ref 10–44)
ANION GAP SERPL CALC-SCNC: 8 MMOL/L (ref 8–16)
AST SERPL-CCNC: 15 U/L (ref 10–40)
BASOPHILS # BLD AUTO: 0.03 K/UL (ref 0–0.2)
BASOPHILS NFR BLD: 0.4 % (ref 0–1.9)
BILIRUB SERPL-MCNC: 0.4 MG/DL (ref 0.1–1)
BUN SERPL-MCNC: 15 MG/DL (ref 6–20)
CALCIUM SERPL-MCNC: 8.2 MG/DL (ref 8.7–10.5)
CHLORIDE SERPL-SCNC: 111 MMOL/L (ref 95–110)
CO2 SERPL-SCNC: 23 MMOL/L (ref 23–29)
CREAT SERPL-MCNC: 1.1 MG/DL (ref 0.5–1.4)
CRYPTOC AG CSF QL LA: NEGATIVE
DIFFERENTIAL METHOD: ABNORMAL
EOSINOPHIL # BLD AUTO: 0 K/UL (ref 0–0.5)
EOSINOPHIL NFR BLD: 0.4 % (ref 0–8)
ERYTHROCYTE [DISTWIDTH] IN BLOOD BY AUTOMATED COUNT: 14.6 % (ref 11.5–14.5)
EST. GFR  (NO RACE VARIABLE): >60 ML/MIN/1.73 M^2
GLUCOSE SERPL-MCNC: 84 MG/DL (ref 70–110)
HCT VFR BLD AUTO: 38.6 % (ref 40–54)
HGB BLD-MCNC: 11.8 G/DL (ref 14–18)
IMM GRANULOCYTES # BLD AUTO: 0.02 K/UL (ref 0–0.04)
IMM GRANULOCYTES NFR BLD AUTO: 0.3 % (ref 0–0.5)
LEVETIRACETAM SERPL-MCNC: 2.7 UG/ML (ref 3–60)
LYMPHOCYTES # BLD AUTO: 1.8 K/UL (ref 1–4.8)
LYMPHOCYTES NFR BLD: 23.4 % (ref 18–48)
MCH RBC QN AUTO: 24.5 PG (ref 27–31)
MCHC RBC AUTO-ENTMCNC: 30.6 G/DL (ref 32–36)
MCV RBC AUTO: 80 FL (ref 82–98)
MONOCYTES # BLD AUTO: 0.8 K/UL (ref 0.3–1)
MONOCYTES NFR BLD: 10.1 % (ref 4–15)
NEUTROPHILS # BLD AUTO: 4.9 K/UL (ref 1.8–7.7)
NEUTROPHILS NFR BLD: 65.4 % (ref 38–73)
NRBC BLD-RTO: 0 /100 WBC
PATH REV BLD -IMP: NORMAL
PLATELET # BLD AUTO: 221 K/UL (ref 150–450)
PMV BLD AUTO: 12.2 FL (ref 9.2–12.9)
POTASSIUM SERPL-SCNC: 3.6 MMOL/L (ref 3.5–5.1)
PROT SERPL-MCNC: 7.3 G/DL (ref 6–8.4)
RBC # BLD AUTO: 4.81 M/UL (ref 4.6–6.2)
SODIUM SERPL-SCNC: 142 MMOL/L (ref 136–145)
VDRL CSF QL: NEGATIVE
WBC # BLD AUTO: 7.52 K/UL (ref 3.9–12.7)

## 2023-05-22 PROCEDURE — 25000003 PHARM REV CODE 250

## 2023-05-22 PROCEDURE — 95718 EEG PHYS/QHP 2-12 HR W/VEEG: CPT | Mod: ,,, | Performed by: STUDENT IN AN ORGANIZED HEALTH CARE EDUCATION/TRAINING PROGRAM

## 2023-05-22 PROCEDURE — 63600175 PHARM REV CODE 636 W HCPCS

## 2023-05-22 PROCEDURE — 99233 SBSQ HOSP IP/OBS HIGH 50: CPT | Mod: FS,,, | Performed by: PHYSICIAN ASSISTANT

## 2023-05-22 PROCEDURE — 11000001 HC ACUTE MED/SURG PRIVATE ROOM

## 2023-05-22 PROCEDURE — 85025 COMPLETE CBC W/AUTO DIFF WBC: CPT

## 2023-05-22 PROCEDURE — 36415 COLL VENOUS BLD VENIPUNCTURE: CPT

## 2023-05-22 PROCEDURE — 99232 PR SUBSEQUENT HOSPITAL CARE,LEVL II: ICD-10-PCS | Mod: ,,, | Performed by: HOSPITALIST

## 2023-05-22 PROCEDURE — 99233 PR SUBSEQUENT HOSPITAL CARE,LEVL III: ICD-10-PCS | Mod: FS,,, | Performed by: PHYSICIAN ASSISTANT

## 2023-05-22 PROCEDURE — 94761 N-INVAS EAR/PLS OXIMETRY MLT: CPT

## 2023-05-22 PROCEDURE — 99232 SBSQ HOSP IP/OBS MODERATE 35: CPT | Mod: ,,, | Performed by: HOSPITALIST

## 2023-05-22 PROCEDURE — 63600175 PHARM REV CODE 636 W HCPCS: Mod: JZ,JG | Performed by: STUDENT IN AN ORGANIZED HEALTH CARE EDUCATION/TRAINING PROGRAM

## 2023-05-22 PROCEDURE — 80053 COMPREHEN METABOLIC PANEL: CPT

## 2023-05-22 PROCEDURE — 99233 PR SUBSEQUENT HOSPITAL CARE,LEVL III: ICD-10-PCS | Mod: ,,, | Performed by: PSYCHIATRY & NEUROLOGY

## 2023-05-22 PROCEDURE — 99233 SBSQ HOSP IP/OBS HIGH 50: CPT | Mod: ,,, | Performed by: PSYCHIATRY & NEUROLOGY

## 2023-05-22 PROCEDURE — 95718 PR EEG, W/VIDEO, CONT RECORD, I&R, 2-12 HRS: ICD-10-PCS | Mod: ,,, | Performed by: STUDENT IN AN ORGANIZED HEALTH CARE EDUCATION/TRAINING PROGRAM

## 2023-05-22 RX ADMIN — HYDROXYZINE HYDROCHLORIDE 25 MG: 25 TABLET, FILM COATED ORAL at 08:05

## 2023-05-22 RX ADMIN — Medication 100 MG: at 08:05

## 2023-05-22 RX ADMIN — HUMAN IMMUNOGLOBULIN G 40 G: 40 LIQUID INTRAVENOUS at 03:05

## 2023-05-22 RX ADMIN — ACETAMINOPHEN 1000 MG: 500 TABLET ORAL at 04:05

## 2023-05-22 RX ADMIN — LEVETIRACETAM 1500 MG: 750 TABLET, FILM COATED ORAL at 08:05

## 2023-05-22 RX ADMIN — ONDANSETRON 4 MG: 4 TABLET ORAL at 08:05

## 2023-05-22 RX ADMIN — ENOXAPARIN SODIUM 40 MG: 40 INJECTION SUBCUTANEOUS at 03:05

## 2023-05-22 RX ADMIN — LEVETIRACETAM 1500 MG: 750 TABLET, FILM COATED ORAL at 09:05

## 2023-05-22 RX ADMIN — HYDROXYZINE HYDROCHLORIDE 25 MG: 25 TABLET, FILM COATED ORAL at 09:05

## 2023-05-22 RX ADMIN — HYDROXYZINE HYDROCHLORIDE 25 MG: 25 TABLET, FILM COATED ORAL at 04:05

## 2023-05-22 RX ADMIN — Medication 6 MG: at 09:05

## 2023-05-22 NOTE — PLAN OF CARE
Problem: Adult Inpatient Plan of Care  Goal: Plan of Care Review  Outcome: Ongoing, Progressing  Goal: Patient-Specific Goal (Individualized)  Outcome: Ongoing, Progressing  Goal: Absence of Hospital-Acquired Illness or Injury  Outcome: Ongoing, Progressing  Goal: Optimal Comfort and Wellbeing  Outcome: Ongoing, Progressing  Goal: Readiness for Transition of Care  Outcome: Ongoing, Progressing     Problem: Impaired Wound Healing  Goal: Optimal Wound Healing  Outcome: Ongoing, Progressing     Problem: Fall Injury Risk  Goal: Absence of Fall and Fall-Related Injury  Outcome: Ongoing, Progressing     Problem: Self-Care Deficit  Goal: Improved Ability to Complete Activities of Daily Living  Outcome: Ongoing, Progressing     Problem: Hypertension Acute  Goal: Blood Pressure Within Desired Range  Outcome: Ongoing, Progressing     Problem: Confusion Acute  Goal: Optimal Cognitive Function  Outcome: Ongoing, Progressing     Problem: Seizure, Active Management  Goal: Absence of Seizure/Seizure-Related Injury  Outcome: Ongoing, Progressing     Problem: Fever  Goal: Body Temperature in Desired Range  Outcome: Ongoing, Progressing    Patient continues to be forgetful, IVF running, VSS, afebrile, EEG cap remains in place, melatonin given for sleep. No seizure-like events noted. Will continue to monitor.

## 2023-05-22 NOTE — PROCEDURES
VIDEO ELECTROENCEPHALOGRAM  REPORT    DATE OF SERVICE: 5/22/23  EEG NUMBER: [id number]  REQUESTED BY:  Dr Zhu  LOCATION OF SERVICE:  [facility]   Electroencephalographic (EEG) recording is with electrodes placed according to the International 10-20 placement system.  Thirty two (32) channels of digital signal (sampling rate of 512/sec) including T1 and T2 was simultaneously recorded from the scalp and may include  EKG, EMG, and/or eye monitors.  Recording band pass was 0.1 to 512 hz.  Digital video recording of the patient is simultaneously recorded with the EEG.  The patient is instructed report clinical symptoms which may occur during the recording session.  EEG and video recording is stored and archived in digital format.  Activation procedures which include photic stimulation, hyperventilation and instructing patients to perform simple task are done in selected patients.   The EEG is displayed on a monitor screen and can be reviewed using different montages.  Computer assisted analysis is employed to detect spike and electrographic seizure activity.   The entire record is submitted for computer analysis.  The entire recording is visually reviewed and the times identified by computer analysis as being spikes or seizures are reviewed again.  Compresses spectral analysis (CSA) is also performed on the activity recorded from each individual channel.  This is displayed as a power display of frequencies from 0 to 30 Hz over time.   The CSA is reviewed looking for asymmetries in power between homologous areas of the scalp and then compared with the original EEG recording.     [employer] software was also utilized in the review of this study.  This software suite analyzes the EEG recording in multiple domains.  Coherence and rhythmicity is computed to identify EEG sections which may contain organized seizures.  Each channel undergoes analysis to detect presence of spike and sharp waves which have special  and morphological characteristic of epileptic activity.  The routine EEG recording is converted from spacial into frequency domain.  This is then displayed comparing homologous areas to identify areas of significant asymmetry.  Algorithm to identify non-cortically generated artifact is used to separate eye movement, EMG and other artifact from the EEG.      ELECTROENCEPHALOGRAM:    DAY 3:  RECORDING TIMES:  Start on 5/22/23 at 07:00  Stop on 5/22/23 at 16:42    A total of 9 hours and 12 minutes was obtained      Indication: 43 yo male with possible autoimmune encephalitis, currently maintained on Keppra.     State of Consciousness:   Awake and asleep    Background:   The background is  mildly disorganized , symmetric and continuous.  At maximum alertness there is a 9 hz PDR present that is symmetric and reactive to eye opening and closure.  There is intermittent theta and delta range slowing during the alert state.     Sleep:   Transition to sleep with symmetric vertex waves and sleep spindles is noted    Non-epileptiform Abnormalities:  Intermittent slow, generalized        Epileptiform Abnormalities:   - No seizures and no clear interictal epileptiform discharges      EKG:   Regular rate and rhythm on single lead EKG    Activating procedures:   - Hyperventilation and photic not performed      Impression:   Abnormal awake and sleep EEG due to background slowing consistent with a mild to moderate diffuse encephalopathy.  No epileptiform discharges or seizures are recorded.  Compared to prior record, there is no significant change.    Jackelyn Ordonez MD  [facility]   Department of Neurology

## 2023-05-22 NOTE — PROGRESS NOTES
"[facility] Neuro[facility] ([facility])  [facility] Medicine  Progress Note    Patient Name: Jayesh Rose  MRN: [id number]  Patient Class: IP- Inpatient   Admission Date: 5/18/2023  Length of Stay: 4 days  Attending Physician: Jose Amezquita MD  Primary Care Provider: Du Shaw MD        Subjective:     Principal Problem:Encephalitis        HPI:  Patient is a 43 yo male with a PMH of anxiety, GERD, and seizure disorder on Keppra who presents to [facility] with altered mental status per wife since waking up the morning of admission. She reports that he has had confusion, retrograde amnesia, and abnormal behavior since this morning. She left for work and received odd messages from him that did not make since so she returned home and found that his confusion and amnesia were worse and he was frothing from the mouth. She states that he was out drinking at a bar with a friend the night before, something he commonly dose, and came back around midnight and seemed in usual state of health. She asked the friend today and friend states there was nothing unusual about their outing. He has a history of tonic clonic seizures that he has been free from for the past several months although last year had to increase the dose of his keppra for breakthrough seizures. His seizures are typically preceded by a prodrome that the patient can feel coming on and followed by a short post ictal state that may involve some confusion, although wife states this current episode is nothing like seizures he has had in the past. He takes his seizure medication daily but often not twice a day as prescribed. Patient is alert with very poor short term memory. He does expresses recent headaches that he is unable to say are different or the same as headaches that he often gets and pain in his neck, back, and hips that is acute and chronic. He repeatedly asks the same questions such as "Why am I here?". He had an episode of vomiting earlier in the " morning. He denies chest pain, shortness of breath, vision changes, or dizziness. He denies sick contacts, any recent travel, recent trauma, and they do not own pets.     He has no smoking history, he does admit to occasional binge drinking of around maybe 8 drinks every once in a while and admits to occasional marijuana use last used 2 weeks ago but no other illicit drug use. He lives with his wife and their young child who have had no abnormal symptoms.    ED course:Vitals stable with high BP and pulse. WBC 25.5. Lactate 6.0. Neurology consulted and LP performed for concern of possible meningitis. CT head unremarkable.       Overview/Hospital Course:  Patient with history of seizures on keppra presented with altered mental status and confusion with short term memory loss after potential seizure activity. Has other symptoms including nausea, diaphoresis, and fever. Admitted for concern of seizure activity with potential meningitis. LP performed but was not suspicious for meningitis. Neurology consulted and MRI brain obtained. MRI brain showed evidence of diffuse restriction in bilateral temporal lobes suspicious for autoimmune encephalitis but can also be seen in postictal encephalitics. ID states low suspicion for infectious etiology. Antimicrobials discontinued and leukocytosis continued to resolve. IVIG started on 5/20 with plans for 5 day course for dynamic approach. Repeat LP pending. EEG from 5/20 showing one L temporal seizure and ictal rhythmic changes so pt loaded with 2g Keppra by neurology. No further seizures on EEG. Continued on keppra 1500 BID.      Interval History: Got LP yesterday for additional send out labs. EEG w/o recurrence of seizures. No major changes. Remains disoriented.    Review of Systems   Reason unable to perform ROS: supplemented by wife, AMS.   Constitutional:  Negative for chills and fever.   HENT:  Negative for congestion and trouble swallowing.    Eyes:  Negative for redness and  visual disturbance.   Respiratory:  Negative for cough.    Cardiovascular:  Negative for chest pain and leg swelling.   Gastrointestinal:  Positive for nausea. Negative for abdominal pain, constipation and diarrhea.   Genitourinary:  Negative for difficulty urinating and dysuria.   Musculoskeletal:  Positive for back pain (chronic) and neck pain (chronic).   Skin:  Negative for rash and wound.   Neurological:  Negative for dizziness and headaches.   Psychiatric/Behavioral:  Positive for confusion and decreased concentration. The patient is nervous/anxious.    Objective:     Vital Signs (Most Recent):  Temp: 99 °F (37.2 °C) (05/22/23 1621)  Pulse: 60 (05/22/23 1621)  Resp: 16 (05/22/23 1621)  BP: (!) 141/89 (05/22/23 1621)  SpO2: 97 % (05/22/23 1621) Vital Signs (24h Range):  Temp:  [98.3 °F (36.8 °C)-99 °F (37.2 °C)] 99 °F (37.2 °C)  Pulse:  [45-77] 60  Resp:  [16-20] 16  SpO2:  [93 %-98 %] 97 %  BP: (115-153)/(73-97) 141/89     Weight: 99.8 kg (220 lb)  Body mass index is 29.84 kg/m².  No intake or output data in the 24 hours ending 05/22/23 1655        Physical Exam  Constitutional:       General: He is not in acute distress.     Appearance: Normal appearance. He is not ill-appearing, toxic-appearing or diaphoretic.   HENT:      Head: Normocephalic and atraumatic.      Mouth/Throat:      Mouth: Mucous membranes are moist.      Tongue: Lesions present.   Eyes:      Extraocular Movements: Extraocular movements intact.      Conjunctiva/sclera: Conjunctivae normal.      Pupils: Pupils are equal, round, and reactive to light.   Cardiovascular:      Rate and Rhythm: Normal rate and regular rhythm.      Pulses: Normal pulses.      Heart sounds: No murmur heard.  Pulmonary:      Effort: Pulmonary effort is normal. No respiratory distress.      Breath sounds: Normal breath sounds. No wheezing, rhonchi or rales.   Abdominal:      General: Abdomen is flat. Bowel sounds are normal. There is no distension.      Palpations:  Abdomen is soft. There is no mass.   Musculoskeletal:      Cervical back: Neck supple. No rigidity.      Right lower leg: No edema.      Left lower leg: No edema.   Skin:     General: Skin is warm and dry.      Capillary Refill: Capillary refill takes less than 2 seconds.      Findings: No bruising or rash.   Neurological:      Mental Status: He is alert. He is disoriented.      Comments: Oriented to person and place. Disoriented to time.    Asks repeated questions with poor short term memory.    Occasionally becomes anxious with diaphoresis   Psychiatric:      Comments: Anxious about health.           Significant Labs: All pertinent labs within the past 24 hours have been reviewed.  CBC:   Recent Labs   Lab 05/21/23  0355 05/22/23  0252   WBC 10.05 7.52   HGB 12.7* 11.8*   HCT 41.0 38.6*    221       CMP:   Recent Labs   Lab 05/21/23  0355 05/22/23  0252    142   K 3.2* 3.6   * 111*   CO2 23 23    84   BUN 14 15   CREATININE 1.3 1.1   CALCIUM 8.0* 8.2*   PROT 7.1 7.3   ALBUMIN 3.4* 3.1*   BILITOT 0.6 0.4   ALKPHOS 68 54*   AST 16 15   ALT 17 15   ANIONGAP 8 8         Significant Imaging: I have reviewed all pertinent imaging results/findings within the past 24 hours.      Assessment/Plan:      * Encephalitis  -See seizure disorder.       Anxiety  -atarax PRN  -Holding SSRI to limit psychoactive medications  -Avoid seizure theshold lowering medications      Encephalopathy, metabolic  -See seizure for plan.      Seizure  Patient has hx of seizures. Was admitted with seizure like activity with prolonged post ictal period. This seizure is unlike previous episodes. WBC 25.5. Lactate 6.0. Lumbar puncture performed and initial labs positive for slightly elevated glucose and protein but normal WBC not suspicious for bacterial meningitis. CT head unremarkable.       Differential includes seizure with post ictal state vs encephalitis vs autoimmune or oncologic pathology.    WBC and lactate  improving. UDS negative. MRI brain suspicious for autoimmune vs postictal encephalitis.     Plan:   -Continuing keppra 1.5g BID  -Neurology consulted, recs appreciated. OK to stop EEG now.    -Thiamine supplementation      DDD (degenerative disc disease), lumbar  -PRNs for pain      GERD (gastroesophageal reflux disease)  Stable.  -Holding PPI inpatient         VTE Risk Mitigation (From admission, onward)         Ordered     enoxaparin injection 40 mg  Daily         05/18/23 1744     IP VTE HIGH RISK PATIENT  Once         05/18/23 1744     Place sequential compression device  Until discontinued         05/18/23 1744                Discharge Planning   ALEJA: 5/25/2023     Code Status: Full Code   Is the patient medically ready for discharge?: No    Reason for patient still in hospital (select all that apply): Patient trending condition             Miles Walters MD  Department of Hospital Medicine   [facility] - Neurosurgery ([facility])

## 2023-05-22 NOTE — SUBJECTIVE & OBJECTIVE
Subjective:     Interval History:   No acute events overnight  LTM EEG reviewed, mild to moderate diffuse encephalopathy  No epileptiform discharges or seizures recorded, degree of epileptogenicity improved from prior recording   IVIg day 3    Current Facility-Administered Medications   Medication Dose Route Frequency Provider Last Rate Last Admin    0.9%  NaCl infusion   Intravenous Continuous Te Garcia MD 75 mL/hr at 05/20/23 2102 New Bag at 05/20/23 2102    acetaminophen tablet 1,000 mg  1,000 mg Oral Q6H PRN Miles Beavers DO   1,000 mg at 05/21/23 1431    dextrose 10% bolus 125 mL 125 mL  12.5 g Intravenous PRN Theodora Zhu MD        dextrose 10% bolus 250 mL 250 mL  25 g Intravenous PRN Theodora Zhu MD        enoxaparin injection 40 mg  40 mg Subcutaneous Daily Velasuqez Mas, DO   40 mg at 05/21/23 1744    glucagon (human recombinant) injection 1 mg  1 mg Intramuscular PRN Velasquez Mas, DO        glucose chewable tablet 16 g  16 g Oral PRN Velasquez Mas, DO        glucose chewable tablet 24 g  24 g Oral PRN Velasquez Mas, DO        hydrOXYzine HCL tablet 25 mg  25 mg Oral TID PRN Elda Manzanares MD   25 mg at 05/22/23 0846    Immune Globulin G (IGG)-PRO-IGA 10 % injection (Privigen) 10 % injection 40 g  0.4 g/kg Intravenous Q24H Te Garcia MD   Stopped at 05/21/23 2050    levETIRAcetam tablet 1,500 mg  1,500 mg Oral Q12H Velasquez Mas, DO   1,500 mg at 05/22/23 0829    LIDOcaine HCL 10 mg/ml (1%) injection 10 mL  10 mL Other On Call Procedure Jose Frankel MD        melatonin tablet 6 mg  6 mg Oral Nightly PRN Elda Manzanares MD   6 mg at 05/21/23 2149    naloxone 0.4 mg/mL injection 0.02 mg  0.02 mg Intravenous PRN Velasquez Mas, DO        ondansetron tablet 4 mg  4 mg Oral Q6H PRN Miles Beavers DO   4 mg at 05/22/23 0846    prochlorperazine tablet 5 mg  5 mg Oral QID PRN Velasquez Mas DO   5 mg at 05/19/23 0951    sodium chloride 0.9% flush 10 mL  10 mL Intravenous Q12H PRN Velasquez  DO Chace        thiamine tablet 100 mg  100 mg Oral Daily Velasquez Mas DO   100 mg at 05/22/23 0829     Review of Systems   Constitutional:  Positive for activity change and fatigue. Negative for fever.   HENT:  Negative for voice change.    Respiratory:  Negative for shortness of breath.    Cardiovascular:  Negative for chest pain.   Gastrointestinal:  Negative for vomiting.   Musculoskeletal:  Negative for gait problem.   Neurological:  Positive for headaches.   Psychiatric/Behavioral:  Positive for confusion, decreased concentration and sleep disturbance. Negative for agitation.    Objective:     Vital Signs (Most Recent):  Temp: 98.9 °F (37.2 °C) (05/22/23 1422)  Pulse: 60 (05/22/23 1422)  Resp: 18 (05/22/23 1422)  BP: 127/81 (05/22/23 1422)  SpO2: 95 % (05/22/23 1422) Vital Signs (24h Range):  Temp:  [98.3 °F (36.8 °C)-99.5 °F (37.5 °C)] 98.9 °F (37.2 °C)  Pulse:  [45-77] 60  Resp:  [17-20] 18  SpO2:  [93 %-98 %] 95 %  BP: (122-153)/(78-97) 127/81     Weight: 99.8 kg (220 lb)  Body mass index is 29.84 kg/m².     Physical Exam  Eyes:      Extraocular Movements: EOM normal.      Pupils: Pupils are equal, round, and reactive to light.   Psychiatric:         Speech: Speech normal.        NEUROLOGICAL EXAMINATION:     MENTAL STATUS   Attention: decreased. Concentration: decreased.   Speech: speech is normal   Level of consciousness: drowsy ,  arousable by verbal stimuli       Oriented to wife and hospital, unable to name [facility]  Not sure of the month  Says he feels like he is waking up for the first time      CRANIAL NERVES     CN III, IV, VI   Pupils are equal, round, and reactive to light.  Extraocular motions are normal.   Nystagmus: none   Diplopia: none  Ophthalmoparesis: none    CN VII   Facial expression full, symmetric.     CN VIII   Hearing: intact    CN IX, X   Palate: symmetric    MOTOR EXAM   Muscle bulk: normal  Overall muscle tone: normal       Moving all extremities against gravity spontaneously       GAIT AND COORDINATION     Gait  Gait: (not assessed)    Tremor   Resting tremor: absent    Significant Labs: All pertinent lab results from the past 24 hours have been reviewed.    Pending: LUCI, autoimmune/paraneoplastic panel, HSV, VZV, Enterovirus, ACE, NMO, MS    EEG (5/21-5/22/23):  Abnormal awake and sleep EEG due to background slowing consistent with a mild to moderate diffuse encephalopathy.  No epileptiform discharges or seizures are recorded.  Compared to prior record, the degree of epileptogenicity has improved (last seizure 5/20/23).     Significant Imaging: I have reviewed all pertinent imaging results/findings within the past 24 hours.

## 2023-05-22 NOTE — SUBJECTIVE & OBJECTIVE
Interval History: Got LP yesterday for additional send out labs. EEG w/o recurrence of seizures. No major changes. Remains disoriented.    Review of Systems   Reason unable to perform ROS: supplemented by wife, AMS.   Constitutional:  Negative for chills and fever.   HENT:  Negative for congestion and trouble swallowing.    Eyes:  Negative for redness and visual disturbance.   Respiratory:  Negative for cough.    Cardiovascular:  Negative for chest pain and leg swelling.   Gastrointestinal:  Positive for nausea. Negative for abdominal pain, constipation and diarrhea.   Genitourinary:  Negative for difficulty urinating and dysuria.   Musculoskeletal:  Positive for back pain (chronic) and neck pain (chronic).   Skin:  Negative for rash and wound.   Neurological:  Negative for dizziness and headaches.   Psychiatric/Behavioral:  Positive for confusion and decreased concentration. The patient is nervous/anxious.    Objective:     Vital Signs (Most Recent):  Temp: 99 °F (37.2 °C) (05/22/23 1621)  Pulse: 60 (05/22/23 1621)  Resp: 16 (05/22/23 1621)  BP: (!) 141/89 (05/22/23 1621)  SpO2: 97 % (05/22/23 1621) Vital Signs (24h Range):  Temp:  [98.3 °F (36.8 °C)-99 °F (37.2 °C)] 99 °F (37.2 °C)  Pulse:  [45-77] 60  Resp:  [16-20] 16  SpO2:  [93 %-98 %] 97 %  BP: (115-153)/(73-97) 141/89     Weight: 99.8 kg (220 lb)  Body mass index is 29.84 kg/m².  No intake or output data in the 24 hours ending 05/22/23 1655        Physical Exam  Constitutional:       General: He is not in acute distress.     Appearance: Normal appearance. He is not ill-appearing, toxic-appearing or diaphoretic.   HENT:      Head: Normocephalic and atraumatic.      Mouth/Throat:      Mouth: Mucous membranes are moist.      Tongue: Lesions present.   Eyes:      Extraocular Movements: Extraocular movements intact.      Conjunctiva/sclera: Conjunctivae normal.      Pupils: Pupils are equal, round, and reactive to light.   Cardiovascular:      Rate and Rhythm:  Normal rate and regular rhythm.      Pulses: Normal pulses.      Heart sounds: No murmur heard.  Pulmonary:      Effort: Pulmonary effort is normal. No respiratory distress.      Breath sounds: Normal breath sounds. No wheezing, rhonchi or rales.   Abdominal:      General: Abdomen is flat. Bowel sounds are normal. There is no distension.      Palpations: Abdomen is soft. There is no mass.   Musculoskeletal:      Cervical back: Neck supple. No rigidity.      Right lower leg: No edema.      Left lower leg: No edema.   Skin:     General: Skin is warm and dry.      Capillary Refill: Capillary refill takes less than 2 seconds.      Findings: No bruising or rash.   Neurological:      Mental Status: He is alert. He is disoriented.      Comments: Oriented to person and place. Disoriented to time.    Asks repeated questions with poor short term memory.    Occasionally becomes anxious with diaphoresis   Psychiatric:      Comments: Anxious about health.           Significant Labs: All pertinent labs within the past 24 hours have been reviewed.  CBC:   Recent Labs   Lab 05/21/23  0355 05/22/23  0252   WBC 10.05 7.52   HGB 12.7* 11.8*   HCT 41.0 38.6*    221       CMP:   Recent Labs   Lab 05/21/23  0355 05/22/23  0252    142   K 3.2* 3.6   * 111*   CO2 23 23    84   BUN 14 15   CREATININE 1.3 1.1   CALCIUM 8.0* 8.2*   PROT 7.1 7.3   ALBUMIN 3.4* 3.1*   BILITOT 0.6 0.4   ALKPHOS 68 54*   AST 16 15   ALT 17 15   ANIONGAP 8 8         Significant Imaging: I have reviewed all pertinent imaging results/findings within the past 24 hours.

## 2023-05-22 NOTE — PROGRESS NOTES
[facility] ([facility])  Neurology  Progress Note    Patient Name: Jayesh Rose  MRN: [id number]  Admission Date: 5/18/2023  Hospital Length of Stay: 4 days  Code Status: Full Code   Attending Provider: Jose Amezquita MD  Primary Care Physician: Du Shaw MD   Principal Problem:Encephalitis    HPI:   41 yo man. neurology initially cosulted for concerns of breakthrough seizures in the setting of signs and symptoms concerning for meningitis. He has a history of anxiety and possibly seizure disorder. Regarding his seizures. They are mostly GTC, per the patient's significant other and he follows at [facility]. He is on keppra 1 g bid. MRI in the past as well as EEG have been unremarkable, per the patient's significant other. He admitted to sometimes not take his keppra. He had multiple episodes today concerning for seizures. Besides this, he also complained of myalgias, neck pain. At arrival, he was found to be tachycardic. Have high WBC and high lactic acid. CT head showed no abnormalities. He was loaded with keppra and an LP was done given concerns for possible meningitis. our initial recommendations included continuing keppra 1 g bid and depending on LP results, let ID take over if infectious or call us back if non-specific and still symptomatic. LP results non specific but not concerning for infections. the patient is still having short term memory problems. the primary team got an MRI w wo c on 5/19 which showed difusse restriction of bilateral mesial temporal lobes.     Subjective:     Interval History:   No acute events overnight  LTM EEG reviewed, mild to moderate diffuse encephalopathy  No epileptiform discharges or seizures recorded, degree of epileptogenicity improved from prior recording   IVIg day 3    Current Facility-Administered Medications   Medication Dose Route Frequency Provider Last Rate Last Admin    0.9%  NaCl infusion   Intravenous Continuous Te Garcia MD 75 mL/hr  at 05/20/23 2102 New Bag at 05/20/23 2102    acetaminophen tablet 1,000 mg  1,000 mg Oral Q6H PRN Miles Beavers, DO   1,000 mg at 05/21/23 1431    dextrose 10% bolus 125 mL 125 mL  12.5 g Intravenous PRN Theodora Zhu MD        dextrose 10% bolus 250 mL 250 mL  25 g Intravenous PRN Theodora Zhu MD        enoxaparin injection 40 mg  40 mg Subcutaneous Daily Velasquez Mas, DO   40 mg at 05/21/23 1744    glucagon (human recombinant) injection 1 mg  1 mg Intramuscular PRN Velasquez Mas, DO        glucose chewable tablet 16 g  16 g Oral PRN Velasquez Mas, DO        glucose chewable tablet 24 g  24 g Oral PRN Velasquez Mas, DO        hydrOXYzine HCL tablet 25 mg  25 mg Oral TID PRN Elda Manzanares MD   25 mg at 05/22/23 0846    Immune Globulin G (IGG)-PRO-IGA 10 % injection (Privigen) 10 % injection 40 g  0.4 g/kg Intravenous Q24H Te Garcia MD   Stopped at 05/21/23 2050    levETIRAcetam tablet 1,500 mg  1,500 mg Oral Q12H Velasquez Mas, DO   1,500 mg at 05/22/23 0829    LIDOcaine HCL 10 mg/ml (1%) injection 10 mL  10 mL Other On Call Procedure Jose Frankel MD        melatonin tablet 6 mg  6 mg Oral Nightly PRN Elda Manzanares MD   6 mg at 05/21/23 2149    naloxone 0.4 mg/mL injection 0.02 mg  0.02 mg Intravenous PRN Velasquez Mas, DO        ondansetron tablet 4 mg  4 mg Oral Q6H PRN Miles Beavers DO   4 mg at 05/22/23 0846    prochlorperazine tablet 5 mg  5 mg Oral QID PRN Velasquez Mas, DO   5 mg at 05/19/23 0951    sodium chloride 0.9% flush 10 mL  10 mL Intravenous Q12H PRN Velasquez Mas, DO        thiamine tablet 100 mg  100 mg Oral Daily Velasquez Mas, DO   100 mg at 05/22/23 0829     Review of Systems   Constitutional:  Positive for activity change and fatigue. Negative for fever.   HENT:  Negative for voice change.    Respiratory:  Negative for shortness of breath.    Cardiovascular:  Negative for chest pain.   Gastrointestinal:  Negative for vomiting.   Musculoskeletal:  Negative for  gait problem.   Neurological:  Positive for headaches.   Psychiatric/Behavioral:  Positive for confusion, decreased concentration and sleep disturbance. Negative for agitation.    Objective:     Vital Signs (Most Recent):  Temp: 98.9 °F (37.2 °C) (05/22/23 1422)  Pulse: 60 (05/22/23 1422)  Resp: 18 (05/22/23 1422)  BP: 127/81 (05/22/23 1422)  SpO2: 95 % (05/22/23 1422) Vital Signs (24h Range):  Temp:  [98.3 °F (36.8 °C)-99.5 °F (37.5 °C)] 98.9 °F (37.2 °C)  Pulse:  [45-77] 60  Resp:  [17-20] 18  SpO2:  [93 %-98 %] 95 %  BP: (122-153)/(78-97) 127/81     Weight: 99.8 kg (220 lb)  Body mass index is 29.84 kg/m².     Physical Exam  Eyes:      Extraocular Movements: EOM normal.      Pupils: Pupils are equal, round, and reactive to light.   Psychiatric:         Speech: Speech normal.        NEUROLOGICAL EXAMINATION:     MENTAL STATUS   Attention: decreased. Concentration: decreased.   Speech: speech is normal   Level of consciousness: drowsy ,  arousable by verbal stimuli       Oriented to wife and hospital, unable to name [facility]  Not sure of the month  Says he feels like he is waking up for the first time      CRANIAL NERVES     CN III, IV, VI   Pupils are equal, round, and reactive to light.  Extraocular motions are normal.   Nystagmus: none   Diplopia: none  Ophthalmoparesis: none    CN VII   Facial expression full, symmetric.     CN VIII   Hearing: intact    CN IX, X   Palate: symmetric    MOTOR EXAM   Muscle bulk: normal  Overall muscle tone: normal       Moving all extremities against gravity spontaneously      GAIT AND COORDINATION     Gait  Gait: (not assessed)    Tremor   Resting tremor: absent    Significant Labs: All pertinent lab results from the past 24 hours have been reviewed.    Pending: LUCI, autoimmune/paraneoplastic panel, HSV, VZV, Enterovirus, ACE, NMO, MS    EEG (5/21-5/22/23):  Abnormal awake and sleep EEG due to background slowing consistent with a mild to moderate diffuse encephalopathy.  No  epileptiform discharges or seizures are recorded.  Compared to prior record, the degree of epileptogenicity has improved (last seizure 5/20/23).     Significant Imaging: I have reviewed all pertinent imaging results/findings within the past 24 hours.    Assessment and Plan:     * Encephalitis  41 yo man with seizures was admitted with concerns of breakthrough seizures in the setting of abrupt change on 5/18. He was loaded with keppra and an LP x 2 (5/18 & 5/21) with noninfectious CSF. Initial LP with slightly elevated protein (45), repeat unremarkable. MRI brain W WO contrast 5/19 with symmetric FLAIR diffusion restriction bilateral mesial temporal lobes without associated abnormal enhancement. Started on empiric IVIG on 5/20 for presumed autoimmune encephalitis while awaiting autoimmune/paraneoplastic panels.     5/22-no acute events overnight  24 hr EEG with mild diffuse slowing, but no epileptiform discharges or electrographic seizures  Wife says he keeps waking up and thinking he woke up for the first time, not knowing when he was admitted    Recommendations:  --continue Keppra 1500 mg BID   Ok to discontinue EEG and continue seizure precautions  --continue IVIG (day 3)   Will f/u pending serum and CSF results  --PT/OT as able to tolerate     VTE Risk Mitigation (From admission, onward)         Ordered     enoxaparin injection 40 mg  Daily         05/18/23 1744     IP VTE HIGH RISK PATIENT  Once         05/18/23 1744     Place sequential compression device  Until discontinued         05/18/23 1744              Will continue to follow along. Please call with any questions or clarifications    Rosa Desai PA-C  General Neurology Consult

## 2023-05-22 NOTE — ASSESSMENT & PLAN NOTE
-atarax PRN  -Holding SSRI to limit psychoactive medications  -Avoid seizure theshold lowering medications

## 2023-05-22 NOTE — MEDICAL/APP STUDENT
"  INPATIENT PROGRESS NOTE  [facility] [place] [facility] 4      Admitted on 5/18/2023   Hospital Day: 5     SUBJECTIVE:               Pt is a 41y/o male with a history of anxiety, seizure disorder, and chronic back pain. He presents with confusion/amnesia and persistently altered mental status following an unwitnessed seizure this morning. Patient's wife (Lakesha) reports that patient went out drinking with a friend last night and returned around midnight. This morning she noted that he had an episode of vomiting and appeared diaphoretic and feverish afterwards. She states that after she left for work he began sending text messages that "made no sense" stating that he was "asleep." At some point after returning home due to concerns about his confusion she found him obtunded, "foaming at the mouth," and not verbally responsive but able to follow some commands. Per wife pt is on Keppra for seizures and sees neurologist at [facility] but does not regularly take full dose as prescribed. Last seizure reported to be approx 1 year ago. Pt states he gets chronic headaches as well as neck and back pain but is unable to say at this time whether this pain is changed from baseline. He currently denies any nausea, vomiting, chills, abdominal pain, neck stiffness, photophobia, sore throat, or cough. His wife states she did not note him having any symptoms of respiratory infection over the past week and per her knowledge he has not been exposed to any sick contacts. Denies recent travel. Patient is an unreliable historian as throughout interview he continues to exhibit short term memory deficits and disorientation to time (states that year is 2021; continues to repeat questions).          Interval history: Pt increasingly confused/disoriented to place and time. States that he recognizes he is in a hospital only because he is able to read the informational whiteboard in his room. States he does not have any appetite, also complains of dysphagia. Unclear " if 2/2 anxiety or nausea. Witnessed pt swallowing meds safely at bedside.      Please refer to H&P for comprehensive past medical, social, and family history.    Review of Systems   Constitutional:  Negative for chills and fever.   HENT:  Negative for sore throat.    Eyes:  Negative for blurred vision, double vision and photophobia.   Respiratory:  Negative for cough, hemoptysis and stridor.    Cardiovascular:  Negative for chest pain and palpitations.   Gastrointestinal:  Positive for nausea. Negative for abdominal pain and vomiting.   Musculoskeletal:  Negative for myalgias and neck pain.   Skin:  Negative for rash.   Neurological:  Negative for dizziness and headaches.   Psychiatric/Behavioral:  Positive for memory loss. The patient is nervous/anxious.        OBJECTIVE:     Vital Signs Recent:  Temp: 99 °F (37.2 °C) (05/22/23 1100)  Pulse: 77 (05/22/23 1100)  Resp: 18 (05/22/23 1100)  BP: 133/85 (05/22/23 1100)  SpO2: 97 % (05/22/23 1100)  Oxygen Documentation:    Flow (L/min): 4           Device (Oxygen Therapy): room air  $ Is the patient on Low Flow Oxygen?: Yes      Vital Signs Range (Last 24H):  Temp:  [98.3 °F (36.8 °C)-99.5 °F (37.5 °C)]   Pulse:  [45-77]   Resp:  [17-20]   BP: (122-153)/(78-97)   SpO2:  [93 %-98 %]        I & O (Last 24H):No intake or output data in the 24 hours ending 05/22/23 1223         Physical Exam  HENT:      Head: Normocephalic and atraumatic.   Eyes:      Extraocular Movements: Extraocular movements intact.      Pupils: Pupils are equal, round, and reactive to light.   Cardiovascular:      Rate and Rhythm: Normal rate and regular rhythm.   Pulmonary:      Effort: Pulmonary effort is normal.      Breath sounds: Normal breath sounds. No wheezing.   Abdominal:      General: Abdomen is flat. Bowel sounds are normal. There is no distension.      Palpations: Abdomen is soft.   Musculoskeletal:      Cervical back: Normal range of motion. No rigidity.   Skin:     Coloration: Skin is not  jaundiced.      Findings: No lesion or rash.   Neurological:      Mental Status: He is alert. He is disoriented.      Cranial Nerves: No cranial nerve deficit.      Comments: Disoriented to time and place.          Labs:   Recent Labs   Lab 05/20/23 0313 05/20/23  1632 05/21/23  0355 05/22/23  0252    144 142 142   K 3.9 2.9* 3.2* 3.6    113* 111* 111*   CO2 24 22* 23 23   BUN 15 12 14 15   CREATININE 1.6* 1.2 1.3 1.1   * 113* 109 84   CALCIUM 8.7 8.3* 8.0* 8.2*   MG 2.6  --  2.2  --    PHOS 3.5  --  2.5*  --        Recent Labs   Lab 05/20/23 0313 05/21/23  0355 05/22/23  0252   ALKPHOS 67 68 54*   ALT 15 17 15   AST 23 16 15   ALBUMIN 3.6 3.4* 3.1*   PROT 6.2 7.1 7.3   BILITOT 0.4 0.6 0.4         Lab Results   Component Value Date    WBC 7.52 05/22/2023    HGB 11.8 (L) 05/22/2023    HCT 38.6 (L) 05/22/2023    MCV 80 (L) 05/22/2023     05/22/2023           ASSESSMENT/PLAN:          Assessment : Jayesh Rose is a 42 y.o. male admitted for Encephalitis        *Encephelopathy  Pt with history of seizure disorder with presentation suspicious for breakthrough seizure and subsequent encephalopathy, now suspected 2/2 autoimmune process vs infection. MRI w/contrast suggestive of autoimmune encephalitis,  Repeat LP remarkable only for mildly elevated segmented neutrophils (16, decreased from prior 32)              -Empiric acyclovir and ceftriaxone given for mengingitis in ED              -F/u remaining repeat LP results and bcx    -CSF with elevated segmented neutrophils (30%)    -meningitis/encephalitis panel negative    -f/u remaining csf studies  -Neuro consulted, following recs                          -f/u EEG                          -keppra increased to 1500g BID              -Continue to monitor for fever or changes in symptoms        -neuro checks q4     Acute kidney injury  Likely 2/2 sepsis and/or poor PO fluid intake   -maintenance IVF   -f/u CMP, CPK   -will continue to trend  creatinine   -avoid NSAIDs and any nephrotoxic agents  Lab Results   Component Value Date/Time    CREATININE 1.1 05/22/2023 02:52 AM    CREATININE 1.3 05/21/2023 03:55 AM    CREATININE 1.2 05/20/2023 04:32 PM       Seizure disorder  Pt with reported history of seizure disorder. Follows with neurologist at [facility], but not fully compliant with medication   -cont. Home Keppra 1.5g BID   -f/u EEG   -neuro checks q4   -seizure precautions   -IgG 40g daily, first dose given Sat 05/20, monitoring response   -thiamine supplement 100mg daily    Anxiety disorder  Pt reports recent increase in frequency of panic attacks. Does not c/o symptoms of anxiety at this time.              -Holding home SSRI for now in the setting of altered mentation and neurological concerns              -Hydroxyzine 25mg prn    Dysphagia  Pt with complaints of dysphagia, potentially related to anxiety. Per family, has been a long term issue.    -Consider SLP evaluation   -anxiety meds prn as above        Lumbar Degenerative Disc Disease              -Holding home mobic and robaxin for now due to altered mentation              -Will continue to monitor for pain and reassess plan as needed           VTE Risk Mitigation (From admission, onward)              Ordered       enoxaparin injection 40 mg  Daily         05/18/23 1744       IP VTE HIGH RISK PATIENT  Once         05/18/23 1744       Place sequential compression device  Until discontinued         05/18/23 1744                             Kori Rabago MS4  [employer]

## 2023-05-22 NOTE — ASSESSMENT & PLAN NOTE
Patient has hx of seizures. Was admitted with seizure like activity with prolonged post ictal period. This seizure is unlike previous episodes. WBC 25.5. Lactate 6.0. Lumbar puncture performed and initial labs positive for slightly elevated glucose and protein but normal WBC not suspicious for bacterial meningitis. CT head unremarkable.       Differential includes seizure with post ictal state vs encephalitis vs autoimmune or oncologic pathology.    WBC and lactate improving. UDS negative. MRI brain suspicious for autoimmune vs postictal encephalitis.     Plan:   -Continuing keppra 1.5g BID  -Neurology consulted, recs appreciated. OK to stop EEG now.    -Thiamine supplementation

## 2023-05-22 NOTE — PROCEDURES
VIDEO ELECTROENCEPHALOGRAM  REPORT    DATE OF SERVICE: 5/21/23-5/22/23  EEG NUMBER: [id number]  REQUESTED BY:  Dr Zhu  LOCATION OF SERVICE:  [facility]    [facility]   Electroencephalographic (EEG) recording is with electrodes placed according to the International 10-20 placement system.  Thirty two (32) channels of digital signal (sampling rate of 512/sec) including T1 and T2 was simultaneously recorded from the scalp and may include  EKG, EMG, and/or eye monitors.  Recording band pass was 0.1 to 512 hz.  Digital video recording of the patient is simultaneously recorded with the EEG.  The patient is instructed report clinical symptoms which may occur during the recording session.  EEG and video recording is stored and archived in digital format.  Activation procedures which include photic stimulation, hyperventilation and instructing patients to perform simple task are done in selected patients.   The EEG is displayed on a monitor screen and can be reviewed using different montages.  Computer assisted analysis is employed to detect spike and electrographic seizure activity.   The entire record is submitted for computer analysis.  The entire recording is visually reviewed and the times identified by computer analysis as being spikes or seizures are reviewed again.  Compresses spectral analysis (CSA) is also performed on the activity recorded from each individual channel.  This is displayed as a power display of frequencies from 0 to 30 Hz over time.   The CSA is reviewed looking for asymmetries in power between homologous areas of the scalp and then compared with the original EEG recording.     [employer] software was also utilized in the review of this study.  This software suite analyzes the EEG recording in multiple domains.  Coherence and rhythmicity is computed to identify EEG sections which may contain organized seizures.  Each channel undergoes analysis to detect presence of spike and sharp waves which have  special and morphological characteristic of epileptic activity.  The routine EEG recording is converted from spacial into frequency domain.  This is then displayed comparing homologous areas to identify areas of significant asymmetry.  Algorithm to identify non-cortically generated artifact is used to separate eye movement, EMG and other artifact from the EEG.      ELECTROENCEPHALOGRAM:    DAY 2:  RECORDING TIMES:  Start on 5/21/23 at 07:00  Stop on 5/22/23 at 07:00    A total of 24 hours of EEG recording was obtained.    Indication: 43 yo male with possible autoimmune encephalitis, currently maintained on Keppra.     State of Consciousness:   Awake and asleep    Background:   The background is  mildly disorganized , symmetric and continuous.  At maximum alertness there is a 9 hz PDR present that is symmetric and reactive to eye opening and closure.  There is intermittent theta and delta range slowing during the alert state.     Sleep:   Transition to sleep with symmetric vertex waves and sleep spindles is noted    Non-epileptiform Abnormalities:  Intermittent slow, generalized        Epileptiform Abnormalities:   - No seizures and no clear interictal epileptiform discharges      EKG:   Regular rate and rhythm on single lead EKG    Activating procedures:   - Hyperventilation and photic not performed      Impression:   Abnormal awake and sleep EEG due to background slowing consistent with a mild to moderate diffuse encephalopathy.  No epileptiform discharges or seizures are recorded.  Compared to prior record, the degree of epileptogenicity has improved (last seizure 5/20/23).     Jackelyn Ordonez MD  [facility]   Department of Neurology

## 2023-05-22 NOTE — ASSESSMENT & PLAN NOTE
41 yo man with seizures was admitted with concerns of breakthrough seizures in the setting of abrupt change on 5/18. He was loaded with keppra and an LP x 2 (5/18 & 5/21) with noninfectious CSF. Initial LP with slightly elevated protein (45), repeat unremarkable. MRI brain W WO contrast 5/19 with symmetric FLAIR diffusion restriction bilateral mesial temporal lobes without associated abnormal enhancement. Started on empiric IVIG on 5/20 for presumed autoimmune encephalitis while awaiting autoimmune/paraneoplastic panels.     5/22-no acute events overnight  24 hr EEG with mild diffuse slowing, but no epileptiform discharges or electrographic seizures  Wife says he keeps waking up and thinking he woke up for the first time, not knowing when he was admitted    Recommendations:  --continue Keppra 1500 mg BID   Ok to discontinue EEG  Continue seizure precautions  --continue IVIG (day 3)   Will f/u pending serum and CSF results  --PT/OT as able to tolerate

## 2023-05-23 LAB
ACE CSF-CCNC: 1.7 U/L (ref 0–2.5)
ALBUMIN SERPL BCP-MCNC: 2.8 G/DL (ref 3.5–5.2)
ALP SERPL-CCNC: 47 U/L (ref 55–135)
ALT SERPL W/O P-5'-P-CCNC: 13 U/L (ref 10–44)
ANION GAP SERPL CALC-SCNC: 7 MMOL/L (ref 8–16)
AST SERPL-CCNC: 15 U/L (ref 10–40)
BACTERIA BLD CULT: NORMAL
BACTERIA BLD CULT: NORMAL
BACTERIA CSF CULT: NO GROWTH
BILIRUB SERPL-MCNC: 0.3 MG/DL (ref 0.1–1)
BUN SERPL-MCNC: 13 MG/DL (ref 6–20)
CALCIUM SERPL-MCNC: 8.2 MG/DL (ref 8.7–10.5)
CHLORIDE SERPL-SCNC: 111 MMOL/L (ref 95–110)
CO2 SERPL-SCNC: 23 MMOL/L (ref 23–29)
CREAT SERPL-MCNC: 1.1 MG/DL (ref 0.5–1.4)
EST. GFR  (NO RACE VARIABLE): >60 ML/MIN/1.73 M^2
GLUCOSE SERPL-MCNC: 83 MG/DL (ref 70–110)
GRAM STN SPEC: NORMAL
HSV1, PCR, CSF: NEGATIVE
HSV2, PCR, CSF: NEGATIVE
MAGNESIUM SERPL-MCNC: 2 MG/DL (ref 1.6–2.6)
PHOSPHATE SERPL-MCNC: 3.4 MG/DL (ref 2.7–4.5)
POCT GLUCOSE: 122 MG/DL (ref 70–110)
POTASSIUM SERPL-SCNC: 3 MMOL/L (ref 3.5–5.1)
PROT SERPL-MCNC: 7.5 G/DL (ref 6–8.4)
SODIUM SERPL-SCNC: 141 MMOL/L (ref 136–145)

## 2023-05-23 PROCEDURE — 97165 OT EVAL LOW COMPLEX 30 MIN: CPT

## 2023-05-23 PROCEDURE — 25000003 PHARM REV CODE 250

## 2023-05-23 PROCEDURE — 97129 THER IVNTJ 1ST 15 MIN: CPT

## 2023-05-23 PROCEDURE — 80053 COMPREHEN METABOLIC PANEL: CPT

## 2023-05-23 PROCEDURE — 99232 PR SUBSEQUENT HOSPITAL CARE,LEVL II: ICD-10-PCS | Mod: ,,, | Performed by: HOSPITALIST

## 2023-05-23 PROCEDURE — 63600175 PHARM REV CODE 636 W HCPCS: Mod: JZ,JG | Performed by: STUDENT IN AN ORGANIZED HEALTH CARE EDUCATION/TRAINING PROGRAM

## 2023-05-23 PROCEDURE — 11000001 HC ACUTE MED/SURG PRIVATE ROOM

## 2023-05-23 PROCEDURE — 97116 GAIT TRAINING THERAPY: CPT

## 2023-05-23 PROCEDURE — 36415 COLL VENOUS BLD VENIPUNCTURE: CPT

## 2023-05-23 PROCEDURE — 99232 SBSQ HOSP IP/OBS MODERATE 35: CPT | Mod: ,,, | Performed by: HOSPITALIST

## 2023-05-23 PROCEDURE — 97535 SELF CARE MNGMENT TRAINING: CPT

## 2023-05-23 PROCEDURE — 63600175 PHARM REV CODE 636 W HCPCS

## 2023-05-23 PROCEDURE — 92610 EVALUATE SWALLOWING FUNCTION: CPT

## 2023-05-23 PROCEDURE — 84100 ASSAY OF PHOSPHORUS: CPT

## 2023-05-23 PROCEDURE — 99233 SBSQ HOSP IP/OBS HIGH 50: CPT | Mod: FS,,, | Performed by: PHYSICIAN ASSISTANT

## 2023-05-23 PROCEDURE — 97161 PT EVAL LOW COMPLEX 20 MIN: CPT

## 2023-05-23 PROCEDURE — 99233 PR SUBSEQUENT HOSPITAL CARE,LEVL III: ICD-10-PCS | Mod: FS,,, | Performed by: PHYSICIAN ASSISTANT

## 2023-05-23 PROCEDURE — 99233 SBSQ HOSP IP/OBS HIGH 50: CPT | Mod: ,,, | Performed by: PSYCHIATRY & NEUROLOGY

## 2023-05-23 PROCEDURE — 99233 PR SUBSEQUENT HOSPITAL CARE,LEVL III: ICD-10-PCS | Mod: ,,, | Performed by: PSYCHIATRY & NEUROLOGY

## 2023-05-23 PROCEDURE — 83735 ASSAY OF MAGNESIUM: CPT

## 2023-05-23 RX ORDER — DEXTROSE 40 %
15 GEL (GRAM) ORAL
Status: DISCONTINUED | OUTPATIENT
Start: 2023-05-23 | End: 2023-05-24 | Stop reason: HOSPADM

## 2023-05-23 RX ORDER — DEXTROSE 40 %
30 GEL (GRAM) ORAL
Status: DISCONTINUED | OUTPATIENT
Start: 2023-05-23 | End: 2023-05-24 | Stop reason: HOSPADM

## 2023-05-23 RX ADMIN — ACETAMINOPHEN 1000 MG: 500 TABLET ORAL at 08:05

## 2023-05-23 RX ADMIN — HUMAN IMMUNOGLOBULIN G 40 G: 40 LIQUID INTRAVENOUS at 05:05

## 2023-05-23 RX ADMIN — ACETAMINOPHEN 1000 MG: 500 TABLET ORAL at 05:05

## 2023-05-23 RX ADMIN — POTASSIUM BICARBONATE 40 MEQ: 391 TABLET, EFFERVESCENT ORAL at 02:05

## 2023-05-23 RX ADMIN — POTASSIUM BICARBONATE 40 MEQ: 391 TABLET, EFFERVESCENT ORAL at 10:05

## 2023-05-23 RX ADMIN — LEVETIRACETAM 1500 MG: 750 TABLET, FILM COATED ORAL at 08:05

## 2023-05-23 RX ADMIN — Medication 100 MG: at 08:05

## 2023-05-23 RX ADMIN — HYDROXYZINE HYDROCHLORIDE 25 MG: 25 TABLET, FILM COATED ORAL at 08:05

## 2023-05-23 RX ADMIN — HYDROXYZINE HYDROCHLORIDE 25 MG: 25 TABLET, FILM COATED ORAL at 05:05

## 2023-05-23 RX ADMIN — ENOXAPARIN SODIUM 40 MG: 40 INJECTION SUBCUTANEOUS at 05:05

## 2023-05-23 NOTE — ASSESSMENT & PLAN NOTE
41 yo man with seizures was admitted with concerns of breakthrough seizures in the setting of abrupt change on 5/18. He was loaded with keppra and an LP x 2 (5/18 & 5/21) with noninfectious CSF. Initial LP with slightly elevated protein (45), repeat unremarkable. MRI brain W WO contrast 5/19 with symmetric FLAIR diffusion restriction bilateral mesial temporal lobes without associated abnormal enhancement. Started on empiric IVIG on 5/20 for presumed autoimmune encephalitis while awaiting autoimmune/paraneoplastic panels.     5/23-wife feels memory is better today  IVIG day 4  Asks about repeat imaging outpatient and how long he will be taking Keppra    Recommendations:  --continue Keppra 1500 mg BID indefinitely  Can discuss when safe to wean as outpatient  Discussed seizure precautions and [place] state law, no driving for 6 months  --IVIG (day 4), will complete course tomorrow   --message sent to support staff to arrange resident clinic follow-up in 3 weeks  Will repeat MRI brain prior to visit to assess for interval change  Pending serum and CSF studies to be discussed at clinic visit  --ok to resume home zoloft 100 mg qd from Neuro standpoint       Yes - the patient is able to be screened

## 2023-05-23 NOTE — PT/OT/SLP EVAL
"Speech Language Pathology Evaluation  Bedside Swallow/ Discharge Note    Patient Name:  Jayesh Rose   MRN:  [id number]  Admitting Diagnosis: Encephalitis    Recommendations:                 General Recommendations:  Follow-up not indicated  Diet recommendations:  Regular, Thin   Aspiration Precautions: Meds whole buried in puree, Meds whole 1 at a time, and Standard aspiration precautions   General Precautions: Standard, fall, aspiration  Communication strategies:  none    Assessment:     Jayesh Rose is a 42 y.o. male with an SLP diagnosis of  swallowing wfl .  He presents with no further speech tx needs.    History:     Past Medical History:   Diagnosis Date    Anxiety 5/18/2023    Class 1 obesity in adult 5/18/2023    GERD (gastroesophageal reflux disease) 1/10/2019    Seizures        History reviewed. No pertinent surgical history.    Social History: Patient lives with his spouse and children.    Prior Intubation HX:  n/a    Modified Barium Swallow: n/a      Prior diet: regular/thin.    Occupation/hobbies/homemaking: .    Subjective     "Everything taste like plastic"  Patient goals: to go home     Pain/Comfort:  Pain Rating 1: 0/10  Pain Rating Post-Intervention 1: 0/10    Respiratory Status: Room air    Objective:     Oral Musculature Evaluation  Oral Musculature: WFL  Dentition: present and adequate  Mucosal Quality: good  Mandibular Strength and Mobility: WFL  Oral Labial Strength and Mobility: WFL  Lingual Strength and Mobility: WFL  Buccal Strength and Mobility: WFL  Volitional Cough: strong  Voice Prior to PO Intake: clear    Bedside Swallow Eval:   Consistencies Assessed:  Thin liquids cup sips  Solids one cracker      Oral Phase:   WFL    Pharyngeal Phase:   no overt clinical signs/symptoms of aspiration    Compensatory Strategies  None    Treatment: Nursing cleared pt for session. Pt's wife reported pt with poor appetite since admission. Pt reported everything taste like " "plastic and he is not very hungry. He and wife report difficulty with taking large pills and some difficulty with meats " getting stuck" since he was a young child. No overt s/s of aspiration noted. Reviewed swallowing precautions and option for taking larger pills including crushing pills, burying them whole in pureed bolus or cutting in half and burying. Pt expressed that he avoids dry chewy meats and other foods that often get stuck. Pt and family expressed understanding of swallowing precautions and s/s of aspiration. No further speech tx recommended at this time.     Goals:   Multidisciplinary Problems       SLP Goals          Problem: SLP    Goal Priority Disciplines Outcome   SLP Goal     SLP                        Plan:     Patient to be seen:      Plan of Care expires:     Plan of Care reviewed with:  patient, spouse, family   SLP Follow-Up:  No       Discharge recommendations:  home   Barriers to Discharge:  None    Time Tracking:     SLP Treatment Date:   05/23/23  Speech Start Time:  0904  Speech Stop Time:  0920     Speech Total Time (min):  16 min    Billable Minutes: Eval Swallow and Oral Function 8 and Self Care/Home Management Training 8    05/23/2023         "

## 2023-05-23 NOTE — MEDICAL/APP STUDENT
"  INPATIENT PROGRESS NOTE  [facility] MED 4      Admitted on 5/18/2023   Hospital Day: 6     SUBJECTIVE:               Pt is a 41y/o male with a history of anxiety, seizure disorder, and chronic back pain. He presents with confusion/amnesia and persistently altered mental status following an unwitnessed seizure this morning. Patient's wife (Lakesha) reports that patient went out drinking with a friend last night and returned around midnight. This morning she noted that he had an episode of vomiting and appeared diaphoretic and feverish afterwards. She states that after she left for work he began sending text messages that "made no sense" stating that he was "asleep." At some point after returning home due to concerns about his confusion she found him obtunded, "foaming at the mouth," and not verbally responsive but able to follow some commands. Per wife pt is on Keppra for seizures and sees neurologist at [facility] but does not regularly take full dose as prescribed. Last seizure reported to be approx 1 year ago. Pt states he gets chronic headaches as well as neck and back pain but is unable to say at this time whether this pain is changed from baseline. He currently denies any nausea, vomiting, chills, abdominal pain, neck stiffness, photophobia, sore throat, or cough. His wife states she did not note him having any symptoms of respiratory infection over the past week and per her knowledge he has not been exposed to any sick contacts. Denies recent travel. Patient is an unreliable historian as throughout interview he continues to exhibit short term memory deficits and disorientation to time (states that year is 2021; continues to repeat questions).          Interval history: No acute events overnight. Per wife, pt has still not been eating. Of note she mentions he has had 2 bowel movements since admission and states he "does not feel them coming on."       Please refer to H&P for comprehensive past medical, social, and " family history.    Review of Systems   Constitutional:  Negative for chills and fever.   HENT:  Negative for sore throat.    Eyes:  Negative for blurred vision, double vision and photophobia.   Respiratory:  Negative for cough, hemoptysis and stridor.    Cardiovascular:  Negative for chest pain and palpitations.   Gastrointestinal:  Positive for nausea. Negative for abdominal pain and vomiting.   Musculoskeletal:  Negative for myalgias and neck pain.   Skin:  Negative for rash.   Neurological:  Negative for dizziness and headaches.   Psychiatric/Behavioral:  Positive for memory loss. The patient is nervous/anxious.        OBJECTIVE:     Vital Signs Recent:  Temp: 98.4 °F (36.9 °C) (05/23/23 0400)  Pulse: (!) 48 (05/23/23 0600)  Resp: (!) 23 (05/23/23 0600)  BP: 134/87 (05/23/23 0600)  SpO2: (!) 94 % (05/23/23 0600)  Oxygen Documentation:    Flow (L/min): 4           Device (Oxygen Therapy): room air  $ Is the patient on Low Flow Oxygen?: Yes      Vital Signs Range (Last 24H):  Temp:  [98.4 °F (36.9 °C)-99.7 °F (37.6 °C)]   Pulse:  [47-66]   Resp:  [8-23]   BP: (115-143)/(73-91)   SpO2:  [91 %-98 %]        I & O (Last 24H):  Intake/Output Summary (Last 24 hours) at 5/23/2023 1108  Last data filed at 5/23/2023 0006  Gross per 24 hour   Intake 200 ml   Output 750 ml   Net -550 ml            Physical Exam  HENT:      Head: Normocephalic and atraumatic.   Eyes:      Extraocular Movements: Extraocular movements intact.      Pupils: Pupils are equal, round, and reactive to light.   Cardiovascular:      Rate and Rhythm: Normal rate and regular rhythm.   Pulmonary:      Effort: Pulmonary effort is normal.      Breath sounds: Normal breath sounds. No wheezing.   Abdominal:      General: Abdomen is flat. Bowel sounds are normal. There is no distension.      Palpations: Abdomen is soft.   Musculoskeletal:      Cervical back: Normal range of motion. No rigidity.   Skin:     Coloration: Skin is not jaundiced.      Findings: No  lesion or rash.   Neurological:      Mental Status: He is alert. He is disoriented.      Cranial Nerves: No cranial nerve deficit.      Comments: Disoriented to time and place.          Labs:   Recent Labs   Lab 05/21/23  0355 05/22/23  0252 05/23/23  0331    142 141   K 3.2* 3.6 3.0*   * 111* 111*   CO2 23 23 23   BUN 14 15 13   CREATININE 1.3 1.1 1.1    84 83   CALCIUM 8.0* 8.2* 8.2*   MG 2.2  --  2.0   PHOS 2.5*  --  3.4       Recent Labs   Lab 05/21/23  0355 05/22/23  0252 05/23/23  0331   ALKPHOS 68 54* 47*   ALT 17 15 13   AST 16 15 15   ALBUMIN 3.4* 3.1* 2.8*   PROT 7.1 7.3 7.5   BILITOT 0.6 0.4 0.3         Lab Results   Component Value Date    WBC 7.52 05/22/2023    HGB 11.8 (L) 05/22/2023    HCT 38.6 (L) 05/22/2023    MCV 80 (L) 05/22/2023     05/22/2023           ASSESSMENT/PLAN:          Assessment : Jayesh Rose is a 42 y.o. male admitted for Encephalitis        *Encephelopathy  Pt with history of seizure disorder with presentation suspicious for breakthrough seizure and subsequent encephalopathy, now suspected 2/2 autoimmune process vs infection. MRI w/contrast suggestive of autoimmune encephalitis,  Repeat LP remarkable only for mildly elevated segmented neutrophils (16, decreased from prior 32)              -Empiric acyclovir and ceftriaxone given for mengingitis in ED              -F/u remaining repeat LP results and bcx    -meningitis/encephalitis panel negative    -f/u remaining csf studies  -Neuro consulted, following recs                          -keppra increased to 1500g BID              -Continue to monitor for fever or changes in symptoms        -neuro checks q4    -continue IVIG (day 4 of 5)  -thiamine supplement 100mg daily    Acute kidney injury  Likely 2/2 sepsis and/or poor PO fluid intake   -maintenance IVF   -f/u CMP, CPK   -will continue to trend creatinine   -avoid NSAIDs and any nephrotoxic agents  Lab Results   Component Value Date/Time     CREATININE 1.1 05/23/2023 03:31 AM    CREATININE 1.1 05/22/2023 02:52 AM    CREATININE 1.3 05/21/2023 03:55 AM       Seizure disorder  Pt with reported history of seizure disorder. Follows with neurologist at [facility], but not fully compliant with medication   -cont. Home Keppra 1.5g BID   -neuro checks q4   -seizure precautions        Anxiety disorder  Pt reports recent increase in frequency of panic attacks. Does not c/o symptoms of anxiety at this time.              -Holding home SSRI for now in the setting of altered mentation and neurological concerns              -Hydroxyzine 25mg prn    Dysphagia  Pt with complaints of dysphagia, potentially related to anxiety. Per family, has been a long term issue.    -encourage PO intake, adding order for boost nutritional supplement   -anxiety meds prn as above        Lumbar Degenerative Disc Disease              -Holding home mobic and robaxin for now due to altered mentation              -Will continue to monitor for pain and reassess plan as needed           VTE Risk Mitigation (From admission, onward)              Ordered       enoxaparin injection 40 mg  Daily         05/18/23 1744       IP VTE HIGH RISK PATIENT  Once         05/18/23 1744       Place sequential compression device  Until discontinued         05/18/23 1744                             Kori Rabago MS4  [employer]

## 2023-05-23 NOTE — PT/OT/SLP PROGRESS
"Occupational Therapy   Evaluation    Name: Jayesh Rose  MRN: [id number]  Admitting Diagnosis:  Encephalitis       Recommendations:     Discharge Recommendations: outpatient OT  Discharge Equipment Recommendations:  none  Barriers to discharge:  None    Assessment:     Jayesh Rose is a 42 y.o. male with a medical diagnosis of Encephalitis.  He presents with performance deficits affecting function are weakness, impaired endurance, impaired self care skills, impaired functional mobility, impaired balance, impaired cognition, decreased coordination.     Rehab Prognosis:  Good; patient would benefit from acute skilled OT services to address these deficits and reach maximum level of function.       Plan:     Patient to be seen 3 x/week to address the above listed problems via neuromuscular re-education, therapeutic exercises, therapeutic activities, self-care/home management  Plan of Care Expires: 06/20/23  Plan of Care Reviewed with: patient    Subjective     Patient:  "I am confused and shaky.  It feels like I have been in a dream. It is hard for me to know what is real and what is not."    Pain/Comfort:  Pain Rating 1: 0/10  Pain Rating Post-Intervention 1: 0/10    Objective:     Communicated with: Nurse prior to session.  Patient found supine with telemetry, peripheral IV, bed alarm upon OT entry to room.    General Precautions: Standard, aspiration, fall    Orthopedic Precautions:N/A  Braces: N/A  Respiratory Status: Room air  Occupational Profile:  Patient resides in [place] with wife and 2 year old son in one story home with one step to enter.  Patient is right handed.  PTA patient independent with ADLs including driving.  Works: .  Hobbies:   being a dad.  Currently owns no DME.     Occupational Performance:   AROM:  WNL  Strength:  4/5    Bed Mobility:    Patient completed Rolling/Turning to Left with  supervision  Patient completed Rolling/Turning to Right with " supervision  Patient completed Supine to Sit with supervision  Patient completed Sit to Supine with supervision     Functional Mobility/Transfers:  Patient completed Sit <> Stand Transfer with supervision  with  no assistive device   Patient completed Bed <> Chair Transfer using Stand Pivot technique with supervision with no assistive device    Activities of Daily Living:  Grooming: Supervision while standing  UB dressing: Supervision   LB dressing:  Supervision   Toileting:  Supervision    [facility] 6 Click ADL: 18    Treatment & Education:  Patient alert and oriented x 3; able to follow 4/4 one step commands.  Patient attentive and interactive throughout the session.  Patient alert and oriented x 3; able to follow 4/4 one step commands.  Patient attentive and interactive throughout the session.  Patient able to identify 5/5 body parts.  Able to name 5/5 objects.  Able to sequence 7/7 days of the week and 12/12 months of the year.      Patient left supine with all lines intact, call button in reach, and bed alarm on    GOALS:   Multidisciplinary Problems       Occupational Therapy Goals          Problem: Occupational Therapy    Goal Priority Disciplines Outcome Interventions   Occupational Therapy Goal     OT, PT/OT Ongoing, Progressing    Description: Goals set 5/23 to be addressed for 14 days with expiration date, 6/6:  Patient will increase functional independence with ADLs by performing:    Patient will demonstrate rolling to the right with modified independence.  Not met   Patient will demonstrate rolling to the left with modified independence.   Not met  Patient will demonstrate supine -sit with modified independence.   Not met  Patient will demonstrate stand pivot transfers with modified independence.   Not met  Patient will demonstrate grooming while standing with modified independence.   Not met  Patient will demonstrate upper body dressing with modified independence while seated EOB.   Not met  Patient will  demonstrate lower body dressing with modified independence while seated EOB.   Not met  Patient will demonstrate toileting with modified independence.   Not met  Patient's family / caregiver will demonstrate independence and safety with assisting patient with self-care skills and functional mobility.     Not met                               Time Tracking:     OT Date of Treatment: 05/23/23  OT Start Time: 0524  OT Stop Time: 0544  OT Total Time (min): 20 min    Billable Minutes:Evaluation 10  Cognitive Retraining 10    OT/GAGE: OT          5/23/2023

## 2023-05-23 NOTE — PLAN OF CARE
Goals remain appropriate.  Problem: Occupational Therapy  Goal: Occupational Therapy Goal  Description: Goals set 5/23 to be addressed for 14 days with expiration date, 6/6:  Patient will increase functional independence with ADLs by performing:    Patient will demonstrate rolling to the right with modified independence.  Not met   Patient will demonstrate rolling to the left with modified independence.   Not met  Patient will demonstrate supine -sit with modified independence.   Not met  Patient will demonstrate stand pivot transfers with modified independence.   Not met  Patient will demonstrate grooming while standing with modified independence.   Not met  Patient will demonstrate upper body dressing with modified independence while seated EOB.   Not met  Patient will demonstrate lower body dressing with modified independence while seated EOB.   Not met  Patient will demonstrate toileting with modified independence.   Not met  Patient's family / caregiver will demonstrate independence and safety with assisting patient with self-care skills and functional mobility.     Not met          Outcome: Ongoing, Progressing

## 2023-05-23 NOTE — CARE UPDATE
RAPID RESPONSE NURSE ROUND       Rounding completed with charge RN, Yoli for bradycardia reports baseline hr, no symptoms. No additional concerns verbalized at this time. Instructed to call [phone] for further concerns or assistance.

## 2023-05-23 NOTE — SUBJECTIVE & OBJECTIVE
Subjective:     Interval History:   Day 4 of IVIG  Pt wife feels today is the first day he has shown significant improvement with memory  Discharge plan and follow-up discussed at length     Current Neurological Medications:   Keppra 1500 mg BID    Current Facility-Administered Medications   Medication Dose Route Frequency Provider Last Rate Last Admin    0.9%  NaCl infusion   Intravenous Continuous Te Garcia MD 75 mL/hr at 05/20/23 2102 New Bag at 05/20/23 2102    acetaminophen tablet 1,000 mg  1,000 mg Oral Q6H PRN Miles Beavers DO   1,000 mg at 05/23/23 0840    dextrose 10% bolus 125 mL 125 mL  12.5 g Intravenous PRN Theodora Zhu MD        dextrose 10% bolus 250 mL 250 mL  25 g Intravenous PRN Theodora Zhu MD        dextrose 40 % gel 15,000 mg  15 g Oral PRN Jose Amezquita MD        dextrose 40 % gel 30,000 mg  30 g Oral PRN Jose Amezquita MD        enoxaparin injection 40 mg  40 mg Subcutaneous Daily Velasquez Mas, DO   40 mg at 05/22/23 1554    glucagon (human recombinant) injection 1 mg  1 mg Intramuscular PRN Velasquez Mas DO        hydrOXYzine HCL tablet 25 mg  25 mg Oral TID PRN Elda Manzanares MD   25 mg at 05/23/23 0840    Immune Globulin G (IGG)-PRO-IGA 10 % injection (Privigen) 10 % injection 40 g  0.4 g/kg Intravenous Q24H Te Garcia  mL/hr at 05/22/23 1703 Rate Change at 05/22/23 1703    levETIRAcetam tablet 1,500 mg  1,500 mg Oral Q12H Velasquez Mas, DO   1,500 mg at 05/23/23 0840    LIDOcaine HCL 10 mg/ml (1%) injection 10 mL  10 mL Other On Call Procedure Jose Frankel MD        melatonin tablet 6 mg  6 mg Oral Nightly PRN Elda Manzanares MD   6 mg at 05/22/23 2130    naloxone 0.4 mg/mL injection 0.02 mg  0.02 mg Intravenous PRN Velasquez Mas, DO        ondansetron tablet 4 mg  4 mg Oral Q6H PRN Miles Beavers DO   4 mg at 05/22/23 0846    potassium bicarbonate disintegrating tablet 40 mEq  40 mEq Oral Q4H Miles Walters MD        prochlorperazine tablet 5  mg  5 mg Oral QID PRN Velasquez Mas DO   5 mg at 05/19/23 0951    sodium chloride 0.9% flush 10 mL  10 mL Intravenous Q12H PRN Velasquez Mas DO        thiamine tablet 100 mg  100 mg Oral Daily Velasquez Mas DO   100 mg at 05/23/23 0840     Review of Systems   Constitutional:  Positive for activity change and fatigue. Negative for fever.   HENT:  Negative for trouble swallowing and voice change.    Respiratory:  Negative for cough and shortness of breath.    Cardiovascular:  Negative for chest pain and leg swelling.   Gastrointestinal:  Negative for vomiting.   Musculoskeletal:  Negative for gait problem and neck stiffness.   Neurological:  Negative for tremors, seizures, speech difficulty, weakness and headaches.   Psychiatric/Behavioral:  Positive for decreased concentration, dysphoric mood and sleep disturbance. Negative for agitation.    Objective:     Vital Signs (Most Recent):  Temp: 98.3 °F (36.8 °C) (05/23/23 1100)  Pulse: 60 (05/23/23 1100)  Resp: 18 (05/23/23 1100)  BP: 128/83 (05/23/23 1100)  SpO2: 98 % (05/23/23 1100) Vital Signs (24h Range):  Temp:  [98.3 °F (36.8 °C)-99.7 °F (37.6 °C)] 98.3 °F (36.8 °C)  Pulse:  [47-66] 60  Resp:  [8-23] 18  SpO2:  [91 %-98 %] 98 %  BP: (115-143)/(73-91) 128/83     Weight: 99.8 kg (220 lb)  Body mass index is 29.84 kg/m².     Physical Exam  Eyes:      Extraocular Movements: EOM normal.   Neurological:      Motor: Motor strength is normal.   Psychiatric:         Speech: Speech normal.        NEUROLOGICAL EXAMINATION:     MENTAL STATUS   Speech: speech is normal   Level of consciousness: alert  Able to name object. Able to repeat. Normal comprehension.        Oriented to self, wife, hospital  Able to name [facility]   Says the last few days have been a blur and he felt like he was in a dream-like state    Able to recall his son's name and birth date      CRANIAL NERVES     CN III, IV, VI   Extraocular motions are normal.   Nystagmus: none   Ophthalmoparesis: none    CN  VII   Facial expression full, symmetric.     CN VIII   Hearing: intact    CN IX, X   Palate: symmetric    CN XII   CN XII normal.     MOTOR EXAM   Muscle bulk: normal    Strength   Strength 5/5 throughout.     SENSORY EXAM   Light touch normal.     GAIT AND COORDINATION     Tremor   Resting tremor: absent    Significant Labs: All pertinent lab results from the past 24 hours have been reviewed.    Significant Imaging: I have reviewed all pertinent imaging results/findings within the past 24 hours.

## 2023-05-23 NOTE — PLAN OF CARE
Problem: Physical Therapy  Goal: Physical Therapy Goal  Outcome: Met   PT D/Ricardo due to pt able to perform functional mobility. Sabra Gilmore PT 5/23/23

## 2023-05-23 NOTE — PROGRESS NOTES
[facility] - Neurosurgery ([facility])  Neurology  Progress Note    Patient Name: Jayesh Rose  MRN: [id number]  Admission Date: 5/18/2023  Hospital Length of Stay: 5 days  Code Status: Full Code   Attending Provider: Jose Amezquita MD  Primary Care Physician: Du Shaw MD   Principal Problem:Encephalitis    HPI:   43 yo man. neurology initially cosulted for concerns of breakthrough seizures in the setting of signs and symptoms concerning for meningitis. He has a history of anxiety and possibly seizure disorder. Regarding his seizures. They are mostly GTC, per the patient's significant other and he follows at [facility]. He is on keppra 1 g bid. MRI in the past as well as EEG have been unremarkable, per the patient's significant other. He admitted to sometimes not take his keppra. He had multiple episodes today concerning for seizures. Besides this, he also complained of myalgias, neck pain. At arrival, he was found to be tachycardic. Have high WBC and high lactic acid. CT head showed no abnormalities. He was loaded with keppra and an LP was done given concerns for possible meningitis. our initial recommendations included continuing keppra 1 g bid and depending on LP results, let ID take over if infectious or call us back if non-specific and still symptomatic. LP results non specific but not concerning for infections. the patient is still having short term memory problems. the primary team got an MRI w wo c on 5/19 which showed difusse restriction of bilateral mesial temporal lobes.       Subjective:     Interval History:   Day 4 of IVIG  Pt wife feels today is the first day he has shown significant improvement with memory  Discharge plan and follow-up discussed at length     Current Neurological Medications:   Keppra 1500 mg BID    Current Facility-Administered Medications   Medication Dose Route Frequency Provider Last Rate Last Admin    0.9%  NaCl infusion   Intravenous Continuous Te Garcia MD 75  mL/hr at 05/20/23 2102 New Bag at 05/20/23 2102    acetaminophen tablet 1,000 mg  1,000 mg Oral Q6H PRN Miles Beavers, DO   1,000 mg at 05/23/23 0840    dextrose 10% bolus 125 mL 125 mL  12.5 g Intravenous PRN Theodora Zhu MD        dextrose 10% bolus 250 mL 250 mL  25 g Intravenous PRN Theodora Zhu MD        dextrose 40 % gel 15,000 mg  15 g Oral PRN Jose Amezquita MD        dextrose 40 % gel 30,000 mg  30 g Oral PRN Jose Amezquita MD        enoxaparin injection 40 mg  40 mg Subcutaneous Daily Velasquez Mas, DO   40 mg at 05/22/23 1554    glucagon (human recombinant) injection 1 mg  1 mg Intramuscular PRN Velasquez Mas, DO        hydrOXYzine HCL tablet 25 mg  25 mg Oral TID PRN Elda Manzanares MD   25 mg at 05/23/23 0840    Immune Globulin G (IGG)-PRO-IGA 10 % injection (Privigen) 10 % injection 40 g  0.4 g/kg Intravenous Q24H Te Garcia  mL/hr at 05/22/23 1703 Rate Change at 05/22/23 1703    levETIRAcetam tablet 1,500 mg  1,500 mg Oral Q12H Velasquez Mas, DO   1,500 mg at 05/23/23 0840    LIDOcaine HCL 10 mg/ml (1%) injection 10 mL  10 mL Other On Call Procedure Jose Frankel MD        melatonin tablet 6 mg  6 mg Oral Nightly PRN Elda Manzanares MD   6 mg at 05/22/23 2130    naloxone 0.4 mg/mL injection 0.02 mg  0.02 mg Intravenous PRN Velasquez Mas, DO        ondansetron tablet 4 mg  4 mg Oral Q6H PRN Miles Beavers DO   4 mg at 05/22/23 0846    potassium bicarbonate disintegrating tablet 40 mEq  40 mEq Oral Q4H Miles Walters MD        prochlorperazine tablet 5 mg  5 mg Oral QID PRN Velasquez Mas, DO   5 mg at 05/19/23 0951    sodium chloride 0.9% flush 10 mL  10 mL Intravenous Q12H PRN Velasquez Mas, DO        thiamine tablet 100 mg  100 mg Oral Daily Velasquez Ozborn, DO   100 mg at 05/23/23 0840     Review of Systems   Constitutional:  Positive for activity change and fatigue. Negative for fever.   HENT:  Negative for trouble swallowing and voice change.     Respiratory:  Negative for cough and shortness of breath.    Cardiovascular:  Negative for chest pain and leg swelling.   Gastrointestinal:  Negative for vomiting.   Musculoskeletal:  Negative for gait problem and neck stiffness.   Neurological:  Negative for tremors, seizures, speech difficulty, weakness and headaches.   Psychiatric/Behavioral:  Positive for decreased concentration, dysphoric mood and sleep disturbance. Negative for agitation.    Objective:     Vital Signs (Most Recent):  Temp: 98.3 °F (36.8 °C) (05/23/23 1100)  Pulse: 60 (05/23/23 1100)  Resp: 18 (05/23/23 1100)  BP: 128/83 (05/23/23 1100)  SpO2: 98 % (05/23/23 1100) Vital Signs (24h Range):  Temp:  [98.3 °F (36.8 °C)-99.7 °F (37.6 °C)] 98.3 °F (36.8 °C)  Pulse:  [47-66] 60  Resp:  [8-23] 18  SpO2:  [91 %-98 %] 98 %  BP: (115-143)/(73-91) 128/83     Weight: 99.8 kg (220 lb)  Body mass index is 29.84 kg/m².     Physical Exam  Eyes:      Extraocular Movements: EOM normal.   Neurological:      Motor: Motor strength is normal.   Psychiatric:         Speech: Speech normal.        NEUROLOGICAL EXAMINATION:     MENTAL STATUS   Speech: speech is normal   Level of consciousness: alert  Able to name object. Able to repeat. Normal comprehension.        Oriented to self, wife, hospital  Able to name [facility]   Says the last few days have been a blur and he felt like he was in a dream-like state    Able to recall his son's name and birth date      CRANIAL NERVES     CN III, IV, VI   Extraocular motions are normal.   Nystagmus: none   Ophthalmoparesis: none    CN VII   Facial expression full, symmetric.     CN VIII   Hearing: intact    CN IX, X   Palate: symmetric    CN XII   CN XII normal.     MOTOR EXAM   Muscle bulk: normal    Strength   Strength 5/5 throughout.     SENSORY EXAM   Light touch normal.     GAIT AND COORDINATION     Tremor   Resting tremor: absent    Significant Labs: All pertinent lab results from the past 24 hours have been  reviewed.    Significant Imaging: I have reviewed all pertinent imaging results/findings within the past 24 hours.    Assessment and Plan:     * Encephalitis  41 yo man with seizures was admitted with concerns of breakthrough seizures in the setting of abrupt change on 5/18. He was loaded with keppra and an LP x 2 (5/18 & 5/21) with noninfectious CSF. Initial LP with slightly elevated protein (45), repeat unremarkable. MRI brain W WO contrast 5/19 with symmetric FLAIR diffusion restriction bilateral mesial temporal lobes without associated abnormal enhancement. Started on empiric IVIG on 5/20 for presumed autoimmune encephalitis while awaiting autoimmune/paraneoplastic panels.     5/23-wife feels memory is better today  IVIG day 4  Asks about repeat imaging outpatient and how long he will be taking Keppra    Recommendations:  --continue Keppra 1500 mg BID indefinitely  Can discuss when safe to wean as outpatient  Discussed seizure precautions and [place] state law, no driving for 6 months  --IVIG (day 4), will complete 5th infusion tomorrow   --message sent to support staff to arrange resident clinic follow-up in 3-4 weeks  Will repeat MRI brain prior to visit to assess for interval change  Pending serum and CSF studies to be discussed at clinic visit  --ok to resume home zoloft 100 mg qd from Neuro standpoint    VTE Risk Mitigation (From admission, onward)         Ordered     enoxaparin injection 40 mg  Daily         05/18/23 1744     IP VTE HIGH RISK PATIENT  Once         05/18/23 1744     Place sequential compression device  Until discontinued         05/18/23 1744              Rosa Desai PA-C  General Neurology Consult

## 2023-05-23 NOTE — PLAN OF CARE
Problem: Cognitive Impairment (Dementia Signs/Symptoms)  Goal: Optimized Cognitive Function (Dementia Signs/Symptoms)  Outcome: Ongoing, Progressing     Problem: Self-Care Deficit  Goal: Improved Ability to Complete Activities of Daily Living  Outcome: Ongoing, Progressing.care    POC reviewed with the patient and they verbalized understanding. All comments and concerns addressed. Bed locked in lowest position with bed alarm set, call light within reach. Safety precautions maintained. VSS, see flow sheet    Will continue to monitor for changes to POC and clinical condition.

## 2023-05-23 NOTE — PLAN OF CARE
Swallow eval completed. Pt safe for regular diet and thin liquids. Large pills may need to be halved or buried in pureed bolus.

## 2023-05-23 NOTE — PT/OT/SLP EVAL
"Physical Therapy Evaluation/D/C Summary    Patient Name:  Jayesh Rose   MRN:  [id number]    Recommendations:     Discharge Recommendations: home   Discharge Equipment Recommendations: none   Barriers to discharge: None    Assessment:     Jayesh Rose is a 42 y.o. male admitted with a medical diagnosis of Encephalitis.  PT D/ana due to pt able to perform functional mobility.Pt educated in safety with mobility, pt expressed understanding.    Recent Surgery: * No surgery found *      Plan:       (PT D/Ana due to pt able to perform functional mobility)   Plan of Care Expires:  06/22/23    Subjective   "I just woke up and I need a minute" "I had a bad dream that something happened to my 2 yr old son"    Pain/Comfort:  Pain Rating 1: 0/10  Pain Rating Post-Intervention 1: 0/10    Patients cultural, spiritual, Mu-ism conflicts given the current situation: no    Living Environment:  Pt lives with his wife and 2 yr old son in  1 story home with [facility].   Prior to admission, patients level of function was independent.  Equipment used at home: none.   Upon discharge, patient will have assistance from family.    Objective:     Communicated with nurse prior to session.  Patient found HOB elevated with bed alarm, peripheral IV, telemetry  upon PT entry to room.    General Precautions: Standard, aspiration, fall  Orthopedic Precautions:N/A   Braces: N/A  Respiratory Status: Room air    Exams:  Cognitive Exam:  Patient is oriented to Person, Place, and Time  Sensation:    -       Intact  light/touch B LE  RLE ROM: WFL  RLE Strength: WFL  LLE ROM: WFL  LLE Strength: WFL    Functional Mobility:  Bed Mobility:     Supine to Sit: independence  Sit to Supine: independence  Transfers:     Sit to Stand:  modified independence with no AD  Gait: 250ft x 2 trials with no AD with independent. Pt ambulated with toe in gait noted. Pt performed standing balance activities:high knee gait, ambulated backwards and single leg " stance with no instability noted.    AM-PAC 6 CLICK MOBILITY  Total Score:24     Treatment & Education:  Pt ambulated to the bathroom and urinated in standing independently. Pt able to clean himself.    Patient left HOB elevated with all lines intact, call button in reach, bed alarm on, and nurse notified.    GOALS:   Multidisciplinary Problems       Physical Therapy Goals       Not on file              Multidisciplinary Problems (Resolved)          Problem: Physical Therapy    Goal Priority Disciplines Outcome Goal Variances Interventions   Physical Therapy Goal   (Resolved)     PT, PT/OT Met                         History:     Past Medical History:   Diagnosis Date    Anxiety 5/18/2023    Class 1 obesity in adult 5/18/2023    GERD (gastroesophageal reflux disease) 1/10/2019    Seizures        History reviewed. No pertinent surgical history.    Time Tracking:     PT Received On: 05/23/23  PT Start Time: 0804     PT Stop Time: 0820  PT Total Time (min): 16 min     Billable Minutes: Evaluation 8 and Gait Training 8      05/23/2023

## 2023-05-23 NOTE — PROGRESS NOTES
"[facility] - Neurosurgery ([facility])  [facility] Medicine  Progress Note    Patient Name: Jayesh Rose  MRN: [id number]  Patient Class: IP- Inpatient   Admission Date: 5/18/2023  Length of Stay: 5 days  Attending Physician: Jose Amezquita MD  Primary Care Provider: Du Shaw MD        Subjective:     Principal Problem:Encephalitis        HPI:  Patient is a 41 yo male with a PMH of anxiety, GERD, and seizure disorder on Keppra who presents to [facility] with altered mental status per wife since waking up the morning of admission. She reports that he has had confusion, retrograde amnesia, and abnormal behavior since this morning. She left for work and received odd messages from him that did not make since so she returned home and found that his confusion and amnesia were worse and he was frothing from the mouth. She states that he was out drinking at a bar with a friend the night before, something he commonly dose, and came back around midnight and seemed in usual state of health. She asked the friend today and friend states there was nothing unusual about their outing. He has a history of tonic clonic seizures that he has been free from for the past several months although last year had to increase the dose of his keppra for breakthrough seizures. His seizures are typically preceded by a prodrome that the patient can feel coming on and followed by a short post ictal state that may involve some confusion, although wife states this current episode is nothing like seizures he has had in the past. He takes his seizure medication daily but often not twice a day as prescribed. Patient is alert with very poor short term memory. He does expresses recent headaches that he is unable to say are different or the same as headaches that he often gets and pain in his neck, back, and hips that is acute and chronic. He repeatedly asks the same questions such as "Why am I here?". He had an episode of vomiting earlier in the " morning. He denies chest pain, shortness of breath, vision changes, or dizziness. He denies sick contacts, any recent travel, recent trauma, and they do not own pets.     He has no smoking history, he does admit to occasional binge drinking of around maybe 8 drinks every once in a while and admits to occasional marijuana use last used 2 weeks ago but no other illicit drug use. He lives with his wife and their young child who have had no abnormal symptoms.    ED course:Vitals stable with high BP and pulse. WBC 25.5. Lactate 6.0. Neurology consulted and LP performed for concern of possible meningitis. CT head unremarkable.       Overview/Hospital Course:  Patient with history of seizures on keppra presented with altered mental status and confusion with short term memory loss after potential seizure activity. Has other symptoms including nausea, diaphoresis, and fever. Admitted for concern of seizure activity with potential meningitis. LP performed but was not suspicious for meningitis. Neurology consulted and MRI brain obtained. MRI brain showed evidence of diffuse restriction in bilateral temporal lobes suspicious for autoimmune encephalitis but can also be seen in postictal encephalitics. ID states low suspicion for infectious etiology. Antimicrobials discontinued and leukocytosis continued to resolve. IVIG started on 5/20 with plans for 5 day course for dynamic approach. Repeat LP pending. EEG from 5/20 showing one L temporal seizure and ictal rhythmic changes so pt loaded with 2g Keppra by neurology. No further seizures on EEG. Continued on keppra 1500 BID.      Interval History: Send out labs pending. Continuing IVIG. Somewhat more oriented today.    Review of Systems   Reason unable to perform ROS: supplemented by wife, AMS.   Constitutional:  Negative for chills and fever.   HENT:  Negative for congestion and trouble swallowing.    Eyes:  Negative for redness and visual disturbance.   Respiratory:  Negative for  cough.    Cardiovascular:  Negative for chest pain and leg swelling.   Gastrointestinal:  Positive for nausea. Negative for abdominal pain, constipation and diarrhea.   Genitourinary:  Negative for difficulty urinating and dysuria.   Musculoskeletal:  Positive for back pain (chronic) and neck pain (chronic).   Skin:  Negative for rash and wound.   Neurological:  Negative for dizziness and headaches.   Psychiatric/Behavioral:  Positive for confusion and decreased concentration. The patient is nervous/anxious.    Objective:     Vital Signs (Most Recent):  Temp: 98.4 °F (36.9 °C) (05/23/23 0400)  Pulse: (!) 48 (05/23/23 0600)  Resp: (!) 23 (05/23/23 0600)  BP: 134/87 (05/23/23 0600)  SpO2: (!) 94 % (05/23/23 0600) Vital Signs (24h Range):  Temp:  [98.4 °F (36.9 °C)-99.7 °F (37.6 °C)] 98.4 °F (36.9 °C)  Pulse:  [47-66] 48  Resp:  [8-23] 23  SpO2:  [91 %-98 %] 94 %  BP: (115-143)/(73-91) 134/87     Weight: 99.8 kg (220 lb)  Body mass index is 29.84 kg/m².    Intake/Output Summary (Last 24 hours) at 5/23/2023 1129  Last data filed at 5/23/2023 0006  Gross per 24 hour   Intake 200 ml   Output 750 ml   Net -550 ml           Physical Exam  Constitutional:       General: He is not in acute distress.     Appearance: Normal appearance. He is not ill-appearing, toxic-appearing or diaphoretic.   HENT:      Head: Normocephalic and atraumatic.      Mouth/Throat:      Mouth: Mucous membranes are moist.      Tongue: Lesions present.   Eyes:      Extraocular Movements: Extraocular movements intact.      Conjunctiva/sclera: Conjunctivae normal.      Pupils: Pupils are equal, round, and reactive to light.   Cardiovascular:      Rate and Rhythm: Normal rate and regular rhythm.      Pulses: Normal pulses.      Heart sounds: No murmur heard.  Pulmonary:      Effort: Pulmonary effort is normal. No respiratory distress.      Breath sounds: Normal breath sounds. No wheezing, rhonchi or rales.   Abdominal:      General: Abdomen is flat. Bowel  sounds are normal. There is no distension.      Palpations: Abdomen is soft. There is no mass.   Musculoskeletal:      Cervical back: Neck supple. No rigidity.      Right lower leg: No edema.      Left lower leg: No edema.   Skin:     General: Skin is warm and dry.      Capillary Refill: Capillary refill takes less than 2 seconds.      Findings: No bruising or rash.   Neurological:      Mental Status: He is alert. He is disoriented.      Comments: Oriented to person and place. Disoriented to time.    Asks repeated questions with poor short term memory.    Occasionally becomes anxious with diaphoresis   Psychiatric:      Comments: Anxious about health.           Significant Labs: All pertinent labs within the past 24 hours have been reviewed.  CBC:   Recent Labs   Lab 05/22/23  0252   WBC 7.52   HGB 11.8*   HCT 38.6*          CMP:   Recent Labs   Lab 05/22/23 0252 05/23/23  0331    141   K 3.6 3.0*   * 111*   CO2 23 23   GLU 84 83   BUN 15 13   CREATININE 1.1 1.1   CALCIUM 8.2* 8.2*   PROT 7.3 7.5   ALBUMIN 3.1* 2.8*   BILITOT 0.4 0.3   ALKPHOS 54* 47*   AST 15 15   ALT 15 13   ANIONGAP 8 7*         Significant Imaging: I have reviewed all pertinent imaging results/findings within the past 24 hours.      Assessment/Plan:      * Encephalitis  -See seizure disorder.       Anxiety  -atarax PRN  -Holding SSRI to limit psychoactive medications  -Avoid seizure theshold lowering medications      Encephalopathy, metabolic  -See seizure for plan.      Seizure  Patient has hx of seizures. Was admitted with seizure like activity with prolonged post ictal period. This seizure is unlike previous episodes. WBC 25.5. Lactate 6.0. Lumbar puncture performed and initial labs positive for slightly elevated glucose and protein but normal WBC not suspicious for bacterial meningitis. CT head unremarkable.       Differential includes seizure with post ictal state vs encephalitis vs autoimmune or oncologic  pathology.    WBC and lactate improving. UDS negative. MRI brain suspicious for autoimmune vs postictal encephalitis.     Plan:   -Continuing keppra 1.5g BID  -Neurology consulted, recs appreciated. OK to stop EEG now.    -Thiamine supplementation      DDD (degenerative disc disease), lumbar  -PRNs for pain      GERD (gastroesophageal reflux disease)  Stable.  -Holding PPI inpatient         VTE Risk Mitigation (From admission, onward)         Ordered     enoxaparin injection 40 mg  Daily         05/18/23 1744     IP VTE HIGH RISK PATIENT  Once         05/18/23 1744     Place sequential compression device  Until discontinued         05/18/23 1744                Discharge Planning   ALEJA: 5/25/2023     Code Status: Full Code   Is the patient medically ready for discharge?: No    Reason for patient still in hospital (select all that apply): Patient trending condition and Treatment           Miles Walters MD  Department of Hospital Medicine   [facility] - Neurosurgery ([facility])

## 2023-05-23 NOTE — SUBJECTIVE & OBJECTIVE
Interval History: Send out labs pending. Continuing IVIG. Somewhat more oriented today.    Review of Systems   Reason unable to perform ROS: supplemented by wife, AMS.   Constitutional:  Negative for chills and fever.   HENT:  Negative for congestion and trouble swallowing.    Eyes:  Negative for redness and visual disturbance.   Respiratory:  Negative for cough.    Cardiovascular:  Negative for chest pain and leg swelling.   Gastrointestinal:  Positive for nausea. Negative for abdominal pain, constipation and diarrhea.   Genitourinary:  Negative for difficulty urinating and dysuria.   Musculoskeletal:  Positive for back pain (chronic) and neck pain (chronic).   Skin:  Negative for rash and wound.   Neurological:  Negative for dizziness and headaches.   Psychiatric/Behavioral:  Positive for confusion and decreased concentration. The patient is nervous/anxious.    Objective:     Vital Signs (Most Recent):  Temp: 98.4 °F (36.9 °C) (05/23/23 0400)  Pulse: (!) 48 (05/23/23 0600)  Resp: (!) 23 (05/23/23 0600)  BP: 134/87 (05/23/23 0600)  SpO2: (!) 94 % (05/23/23 0600) Vital Signs (24h Range):  Temp:  [98.4 °F (36.9 °C)-99.7 °F (37.6 °C)] 98.4 °F (36.9 °C)  Pulse:  [47-66] 48  Resp:  [8-23] 23  SpO2:  [91 %-98 %] 94 %  BP: (115-143)/(73-91) 134/87     Weight: 99.8 kg (220 lb)  Body mass index is 29.84 kg/m².    Intake/Output Summary (Last 24 hours) at 5/23/2023 1129  Last data filed at 5/23/2023 0006  Gross per 24 hour   Intake 200 ml   Output 750 ml   Net -550 ml           Physical Exam  Constitutional:       General: He is not in acute distress.     Appearance: Normal appearance. He is not ill-appearing, toxic-appearing or diaphoretic.   HENT:      Head: Normocephalic and atraumatic.      Mouth/Throat:      Mouth: Mucous membranes are moist.      Tongue: Lesions present.   Eyes:      Extraocular Movements: Extraocular movements intact.      Conjunctiva/sclera: Conjunctivae normal.      Pupils: Pupils are equal, round,  and reactive to light.   Cardiovascular:      Rate and Rhythm: Normal rate and regular rhythm.      Pulses: Normal pulses.      Heart sounds: No murmur heard.  Pulmonary:      Effort: Pulmonary effort is normal. No respiratory distress.      Breath sounds: Normal breath sounds. No wheezing, rhonchi or rales.   Abdominal:      General: Abdomen is flat. Bowel sounds are normal. There is no distension.      Palpations: Abdomen is soft. There is no mass.   Musculoskeletal:      Cervical back: Neck supple. No rigidity.      Right lower leg: No edema.      Left lower leg: No edema.   Skin:     General: Skin is warm and dry.      Capillary Refill: Capillary refill takes less than 2 seconds.      Findings: No bruising or rash.   Neurological:      Mental Status: He is alert. He is disoriented.      Comments: Oriented to person and place. Disoriented to time.    Asks repeated questions with poor short term memory.    Occasionally becomes anxious with diaphoresis   Psychiatric:      Comments: Anxious about health.           Significant Labs: All pertinent labs within the past 24 hours have been reviewed.  CBC:   Recent Labs   Lab 05/22/23  0252   WBC 7.52   HGB 11.8*   HCT 38.6*          CMP:   Recent Labs   Lab 05/22/23  0252 05/23/23  0331    141   K 3.6 3.0*   * 111*   CO2 23 23   GLU 84 83   BUN 15 13   CREATININE 1.1 1.1   CALCIUM 8.2* 8.2*   PROT 7.3 7.5   ALBUMIN 3.1* 2.8*   BILITOT 0.4 0.3   ALKPHOS 54* 47*   AST 15 15   ALT 15 13   ANIONGAP 8 7*         Significant Imaging: I have reviewed all pertinent imaging results/findings within the past 24 hours.

## 2023-05-24 ENCOUNTER — PATIENT MESSAGE (OUTPATIENT)
Dept: NEUROLOGY | Facility: CLINIC | Age: 43
End: 2023-05-24
Payer: COMMERCIAL

## 2023-05-24 VITALS
SYSTOLIC BLOOD PRESSURE: 137 MMHG | HEIGHT: 72 IN | DIASTOLIC BLOOD PRESSURE: 83 MMHG | TEMPERATURE: 97 F | BODY MASS INDEX: 29.8 KG/M2 | OXYGEN SATURATION: 98 % | HEART RATE: 53 BPM | RESPIRATION RATE: 18 BRPM | WEIGHT: 220 LBS

## 2023-05-24 LAB
ALBUMIN SERPL BCP-MCNC: 3 G/DL (ref 3.5–5.2)
ALP SERPL-CCNC: 50 U/L (ref 55–135)
ALT SERPL W/O P-5'-P-CCNC: 19 U/L (ref 10–44)
ANION GAP SERPL CALC-SCNC: 8 MMOL/L (ref 8–16)
AST SERPL-CCNC: 20 U/L (ref 10–40)
BASOPHILS # BLD AUTO: 0.03 K/UL (ref 0–0.2)
BASOPHILS NFR BLD: 0.5 % (ref 0–1.9)
BILIRUB SERPL-MCNC: 0.3 MG/DL (ref 0.1–1)
BUN SERPL-MCNC: 12 MG/DL (ref 6–20)
CALCIUM SERPL-MCNC: 8.3 MG/DL (ref 8.7–10.5)
CHLORIDE SERPL-SCNC: 109 MMOL/L (ref 95–110)
CO2 SERPL-SCNC: 23 MMOL/L (ref 23–29)
CREAT SERPL-MCNC: 1 MG/DL (ref 0.5–1.4)
DIFFERENTIAL METHOD: ABNORMAL
EOSINOPHIL # BLD AUTO: 0.1 K/UL (ref 0–0.5)
EOSINOPHIL NFR BLD: 1.6 % (ref 0–8)
ERYTHROCYTE [DISTWIDTH] IN BLOOD BY AUTOMATED COUNT: 14.6 % (ref 11.5–14.5)
EST. GFR  (NO RACE VARIABLE): >60 ML/MIN/1.73 M^2
EV RNA SPEC QL NAA+PROBE: NEGATIVE
GLUCOSE SERPL-MCNC: 82 MG/DL (ref 70–110)
HCT VFR BLD AUTO: 38.6 % (ref 40–54)
HGB BLD-MCNC: 12.9 G/DL (ref 14–18)
IMM GRANULOCYTES # BLD AUTO: 0.02 K/UL (ref 0–0.04)
IMM GRANULOCYTES NFR BLD AUTO: 0.4 % (ref 0–0.5)
KAPPA LC FREE CSF-MCNC: 0.01 MG/DL
LYMPHOCYTES # BLD AUTO: 1.9 K/UL (ref 1–4.8)
LYMPHOCYTES NFR BLD: 34 % (ref 18–48)
MAGNESIUM SERPL-MCNC: 1.9 MG/DL (ref 1.6–2.6)
MCH RBC QN AUTO: 25.7 PG (ref 27–31)
MCHC RBC AUTO-ENTMCNC: 33.4 G/DL (ref 32–36)
MCV RBC AUTO: 77 FL (ref 82–98)
MONOCYTES # BLD AUTO: 0.4 K/UL (ref 0.3–1)
MONOCYTES NFR BLD: 7.8 % (ref 4–15)
NEUTROPHILS # BLD AUTO: 3.2 K/UL (ref 1.8–7.7)
NEUTROPHILS NFR BLD: 55.7 % (ref 38–73)
NRBC BLD-RTO: 0 /100 WBC
PHOSPHATE SERPL-MCNC: 3.5 MG/DL (ref 2.7–4.5)
PLATELET # BLD AUTO: 220 K/UL (ref 150–450)
PMV BLD AUTO: 11.4 FL (ref 9.2–12.9)
POTASSIUM SERPL-SCNC: 3.4 MMOL/L (ref 3.5–5.1)
PROT SERPL-MCNC: 7.8 G/DL (ref 6–8.4)
RBC # BLD AUTO: 5.02 M/UL (ref 4.6–6.2)
SODIUM SERPL-SCNC: 140 MMOL/L (ref 136–145)
SPECIMEN SOURCE: NORMAL
SPECIMEN SOURCE: NORMAL
VARICELLA ZOSTER BY PCR RESULT: NEGATIVE
VDRL CSF QL: NEGATIVE
WBC # BLD AUTO: 5.65 K/UL (ref 3.9–12.7)

## 2023-05-24 PROCEDURE — 83735 ASSAY OF MAGNESIUM: CPT

## 2023-05-24 PROCEDURE — 99238 HOSP IP/OBS DSCHRG MGMT 30/<: CPT | Mod: ,,, | Performed by: HOSPITALIST

## 2023-05-24 PROCEDURE — 25000003 PHARM REV CODE 250

## 2023-05-24 PROCEDURE — 99233 SBSQ HOSP IP/OBS HIGH 50: CPT | Mod: ,,, | Performed by: PSYCHIATRY & NEUROLOGY

## 2023-05-24 PROCEDURE — 25000003 PHARM REV CODE 250: Performed by: STUDENT IN AN ORGANIZED HEALTH CARE EDUCATION/TRAINING PROGRAM

## 2023-05-24 PROCEDURE — 99233 PR SUBSEQUENT HOSPITAL CARE,LEVL III: ICD-10-PCS | Mod: ,,, | Performed by: PSYCHIATRY & NEUROLOGY

## 2023-05-24 PROCEDURE — 99233 PR SUBSEQUENT HOSPITAL CARE,LEVL III: ICD-10-PCS | Mod: FS,,, | Performed by: PHYSICIAN ASSISTANT

## 2023-05-24 PROCEDURE — 63600175 PHARM REV CODE 636 W HCPCS: Mod: JZ,JG

## 2023-05-24 PROCEDURE — 84100 ASSAY OF PHOSPHORUS: CPT

## 2023-05-24 PROCEDURE — 36415 COLL VENOUS BLD VENIPUNCTURE: CPT

## 2023-05-24 PROCEDURE — 80053 COMPREHEN METABOLIC PANEL: CPT

## 2023-05-24 PROCEDURE — 85025 COMPLETE CBC W/AUTO DIFF WBC: CPT | Performed by: HOSPITALIST

## 2023-05-24 PROCEDURE — 99238 PR HOSPITAL DISCHARGE DAY,<30 MIN: ICD-10-PCS | Mod: ,,, | Performed by: HOSPITALIST

## 2023-05-24 PROCEDURE — 99233 SBSQ HOSP IP/OBS HIGH 50: CPT | Mod: FS,,, | Performed by: PHYSICIAN ASSISTANT

## 2023-05-24 RX ORDER — ONDANSETRON 4 MG/1
4 TABLET, FILM COATED ORAL EVERY 6 HOURS PRN
Qty: 60 TABLET | Refills: 0 | Status: SHIPPED | OUTPATIENT
Start: 2023-05-24 | End: 2023-06-08

## 2023-05-24 RX ORDER — HYDROXYZINE HYDROCHLORIDE 25 MG/1
25 TABLET, FILM COATED ORAL 3 TIMES DAILY PRN
Qty: 90 TABLET | Refills: 1 | Status: SHIPPED | OUTPATIENT
Start: 2023-05-24 | End: 2023-07-23

## 2023-05-24 RX ORDER — LEVETIRACETAM 750 MG/1
1500 TABLET ORAL EVERY 12 HOURS
Qty: 120 TABLET | Refills: 2 | Status: SHIPPED | OUTPATIENT
Start: 2023-05-24 | End: 2024-04-02

## 2023-05-24 RX ADMIN — SODIUM CHLORIDE: 9 INJECTION, SOLUTION INTRAVENOUS at 05:05

## 2023-05-24 RX ADMIN — ACETAMINOPHEN 1000 MG: 500 TABLET ORAL at 12:05

## 2023-05-24 RX ADMIN — Medication 100 MG: at 08:05

## 2023-05-24 RX ADMIN — LEVETIRACETAM 1500 MG: 750 TABLET, FILM COATED ORAL at 08:05

## 2023-05-24 RX ADMIN — ONDANSETRON 4 MG: 4 TABLET ORAL at 12:05

## 2023-05-24 RX ADMIN — HUMAN IMMUNOGLOBULIN G 40 G: 40 LIQUID INTRAVENOUS at 03:05

## 2023-05-24 RX ADMIN — POTASSIUM BICARBONATE 40 MEQ: 391 TABLET, EFFERVESCENT ORAL at 08:05

## 2023-05-24 NOTE — DISCHARGE INSTRUCTIONS
Patient discharged to home after IVIG infusion.  Patient to follow up with new PCP next week, at scheduled time.  Patient and wife verbalize understanding of teaching given concerning signs and symptoms that will require a follow up in ED.  IV removed, and site covered with gauze.  Patient transferred to home in care of wife.

## 2023-05-24 NOTE — DISCHARGE SUMMARY
[facility] - Neurosurgery ([facility])  [facility] Medicine  Discharge Summary      Patient Name: Jayesh Rose  MRN: [id number]  NU: [id number]  Patient Class: IP- Inpatient  Admission Date: 5/18/2023  Hospital Length of Stay: 6 days  Discharge Date and Time:  05/24/2023 4:28 PM  Attending Physician: Jose Amezquita MD   Discharging Provider: Miles Walters MD  Primary Care Provider: Du Shaw MD  [facility] Medicine Team: [facility] HOSP MED 4 Miles Walters MD  Primary Care Team: [facility] HOSP MED 4    HPI:   Patient is a 43 yo male with a PMH of anxiety, GERD, and seizure disorder on Keppra who presents to [facility] with altered mental status per wife since waking up the morning of admission. She reports that he has had confusion, retrograde amnesia, and abnormal behavior since this morning. She left for work and received odd messages from him that did not make since so she returned home and found that his confusion and amnesia were worse and he was frothing from the mouth. She states that he was out drinking at a bar with a friend the night before, something he commonly dose, and came back around midnight and seemed in usual state of health. She asked the friend today and friend states there was nothing unusual about their outing. He has a history of tonic clonic seizures that he has been free from for the past several months although last year had to increase the dose of his keppra for breakthrough seizures. His seizures are typically preceded by a prodrome that the patient can feel coming on and followed by a short post ictal state that may involve some confusion, although wife states this current episode is nothing like seizures he has had in the past. He takes his seizure medication daily but often not twice a day as prescribed. Patient is alert with very poor short term memory. He does expresses recent headaches that he is unable to say are different or the same as headaches that he often gets and pain  "in his neck, back, and hips that is acute and chronic. He repeatedly asks the same questions such as "Why am I here?". He had an episode of vomiting earlier in the morning. He denies chest pain, shortness of breath, vision changes, or dizziness. He denies sick contacts, any recent travel, recent trauma, and they do not own pets.     He has no smoking history, he does admit to occasional binge drinking of around maybe 8 drinks every once in a while and admits to occasional marijuana use last used 2 weeks ago but no other illicit drug use. He lives with his wife and their young child who have had no abnormal symptoms.    ED course:Vitals stable with high BP and pulse. WBC 25.5. Lactate 6.0. Neurology consulted and LP performed for concern of possible meningitis. CT head unremarkable.       * No surgery found *      Hospital Course:   Patient with history of seizures on keppra presented with altered mental status and confusion with short term memory loss after potential seizure activity. Has other symptoms including nausea, diaphoresis, and fever. Admitted for concern of seizure activity with potential meningitis. LP performed but was not suspicious for meningitis. Neurology consulted and MRI brain obtained. MRI brain showed evidence of diffuse restriction in bilateral temporal lobes suspicious for autoimmune encephalitis but can also be seen in postictal encephalitics. ID states low suspicion for infectious etiology. Antimicrobials discontinued and leukocytosis continued to resolve. IVIG started on 5/20 with plans for 5 day course. Repeat LP done, labs are pending. EEG from 5/20 showing one L temporal seizure and ictal rhythmic changes so pt loaded with 2g Keppra by neurology. No further seizures on EEG. Continued on keppra 1500 BID. To get MRI as outpatient and follow up w/ neurology on 6/15.       Goals of Care Treatment Preferences:  Code Status: Full Code      Consults:   Consults (From admission, onward)        " Status Ordering Provider     Inpatient consult to Neurology  Once        Provider:  (Not yet assigned)    Completed ANDRE, NOOR          No new Assessment & Plan notes have been filed under this hospital service since the last note was generated.  Service: Hospital Medicine    Final Active Diagnoses:    Diagnosis Date Noted POA    PRINCIPAL PROBLEM:  Encephalitis [G04.90] 05/20/2023 Yes    Seizure [R56.9] 05/18/2023 Yes    Encephalopathy, metabolic [G93.41] 05/18/2023 Yes    Anxiety [F41.9] 05/18/2023 Yes    DDD (degenerative disc disease), lumbar [M51.36] 05/12/2023 Yes    GERD (gastroesophageal reflux disease) [K21.9] 01/10/2019 No      Problems Resolved During this Admission:    Diagnosis Date Noted Date Resolved POA    Class 1 obesity in adult [E66.9] 05/18/2023 05/22/2023 Yes       Discharged Condition: stable    Disposition: Home or Self Care    Follow Up:   Follow-up Information     [facility] - Neurology 7th Fl. Go on 6/15/2023.    Specialty: Neurology  Contact information:  [address]  [facility] [address] [phone]  Additional information:  Neuro[facility] [place] [facility], 7th Floor   Please park in [facility] and take Clinic elevator           Cosmo Torres MD. Go on 6/5/2023.    Specialty: Internal Medicine  Why: PCP [facility] discharge - June 5 @ 10 AM  Contact information:  [address] [facility]  [place] [address]  [phone]                       Patient Instructions:      MRI Brain W WO Contrast   Standing Status: Future Standing Exp. Date: 05/24/24   Scheduling Instructions: Schedule on or around 6/7/2023     Order Specific Question Answer Comments   Does the patient have a pacemaker or a defibrillator (Note: Some facilities may not be able to schedule an MRI for patients with pacemakers and defibrillators. You should contact your local radiology department to determine if this is the case.)? No    Does the patient have an aneurysm or surgical  clip, pump, nerve/brain stimulator, middle/inner ear prosthesis, or other metal implant or foreign object (bullet, shrapnel)? If they have a card related to their implant, ask them to bring it. Issues related to the implant may cause the MRI to be delayed. No    Is the patient claustrophobic? No    Will the patient require sedation? No    Does the patient have any of the following conditions? Diabetes, History of Renal Disease or Hypertension requiring medical therapy? No    May the Radiologist modify the order per protocol to meet the clinical needs of the patient? Yes    Is this part of a Research Study? No    Does the patient have on a skin patch for medication with aluminized backing? No      Ambulatory referral/consult to Physical/Occupational Therapy   Standing Status: Future   Referral Priority: Routine Referral Type: Physical Medicine   Referral Reason: Specialty Services Required   Number of Visits Requested: 1     Ambulatory referral/consult to Internal Medicine   Standing Status: Future   Referral Priority: Routine Referral Type: Consultation   Referral Reason: Specialty Services Required   Requested Specialty: Internal Medicine   Number of Visits Requested: 1       Significant Diagnostic Studies: Labs: All labs within the past 24 hours have been reviewed    Pending Diagnostic Studies:     Procedure Component Value Units Date/Time    Enterovirus RNA by PCR [facility]; Cerebrospinal Fluid [[id number]] Collected: 05/21/23 1348    Order Status: Sent Lab Status: In process Updated: 05/21/23 1424    Freeze and Hold, [facility] [[id number]] Collected: 05/21/23 1347    Order Status: Sent Lab Status: No result     Specimen: CSF (Spinal Fluid) from Cerebrospinal Fluid     Freeze and Hold,  [[id number]] Collected: 05/18/23 1624    Order Status: Sent Lab Status: No result     Specimen: CSF (Spinal Fluid) from Cerebrospinal Fluid     GAD65 Antibody, CSF [[id number]] Collected: 05/21/23 1348    Order Status: Sent Lab Status: In  process Updated: 05/21/23 1424    Specimen: CSF (Spinal Fluid) from Cerebrospinal Fluid     NMO AQUAPORIN-4-IGG, CSF [[id number]] Collected: 05/21/23 1347    Order Status: Sent Lab Status: In process Updated: 05/21/23 1423    Specimen: CSF (Spinal Fluid) from Cerebrospinal Fluid     West Nile Virus by PCR, CSF [[id number]] Collected: 05/21/23 1347    Order Status: Sent Lab Status: In process Updated: 05/21/23 1424    Specimen: CSF (Spinal Fluid) from Cerebrospinal Fluid     West Nile Virus by PCR, CSF [[id number]] Collected: 05/21/23 1347    Order Status: Sent Lab Status: In process Updated: 05/21/23 1424    Specimen: CSF (Spinal Fluid) from Cerebrospinal Fluid          Medications:  Reconciled Home Medications:      Medication List      START taking these medications    hydrOXYzine HCL 25 MG tablet  Commonly known as: ATARAX  Take 1 tablet (25 mg total) by mouth 3 (three) times daily as needed for Anxiety.     ondansetron 4 MG tablet  Commonly known as: ZOFRAN  Take 1 tablet (4 mg total) by mouth every 6 (six) hours as needed for Nausea.        CHANGE how you take these medications    levETIRAcetam 750 MG Tab  Commonly known as: KEPPRA  Take 2 tablets (1,500 mg total) by mouth every 12 (twelve) hours.  What changed:   · medication strength  · how much to take  · when to take this        CONTINUE taking these medications    ibuprofen 200 MG tablet  Commonly known as: ADVIL,MOTRIN  Take 400-800 mg by mouth 2 (two) times daily as needed for Pain.     MUCINEX ORAL  Take 1 tablet by mouth 2 (two) times daily as needed (allergies).     multivitamin per tablet  Commonly known as: THERAGRAN  Take 1 tablet by mouth once daily.     omeprazole 20 MG tablet  Commonly known as: PRILOSEC OTC  Take 20 mg by mouth daily as needed (heartburn).     sertraline 100 MG tablet  Commonly known as: ZOLOFT  Take 100 mg by mouth once daily.        STOP taking these medications    diazePAM 5 MG tablet  Commonly known as: VALIUM     meloxicam  7.5 MG tablet  Commonly known as: MOBIC     methocarbamoL 750 MG Tab  Commonly known as: ROBAXIN            Indwelling Lines/Drains at time of discharge:   Lines/Drains/Airways     None                 Time spent on the discharge of patient: 30 minutes         Miles Walters MD  Department of Hospital Medicine  [facility] - Neurosurgery ([facility])

## 2023-05-24 NOTE — PLAN OF CARE
[facility] (Hospital)  Discharge Final Note    Primary Care Provider: Du Shaw MD  Expected Discharge Date: 5/24/2023    Patient medically ready for discharge to home.   Transportation by wife.   Is family/patient aware of discharge: yes   Hospital follow up scheduled: yes       Final Discharge Note (most recent)       Final Note - 05/24/23 1531          Final Note    Assessment Type Final Discharge Note     Anticipated Discharge Disposition Home or Self Care     Hospital Resources/Appts/Education Provided Provided patient/caregiver with written discharge plan information                     Important Message from Medicare         Referral Info (most recent)       Referral Info    No documentation.                 Contact Info       [facility]th Fl   Specialty: Neurology    [address]  [facility] [address]   Phone: [phone]       Next Steps: Go on 6/15/2023    Cosmo Torres MD   Specialty: Internal Medicine    [address] [facility] [place] [address]   Phone: [phone]       Next Steps: Go on 6/5/2023    Instructions: PCP [facility] discharge - June 5 @ 10 AM          Future Appointments   Date Time Provider Department Center   6/1/2023 10:00 AM Leeanne Murguia, PT [facility]   6/5/2023 10:00 AM Cosmo Torres MD [facility]   6/6/2023 10:00 AM Jimmie Melvi, PT [facility]   6/8/2023 10:00 AM Jimmie Melvi, PT [facility]   6/13/2023 10:00 AM Leeanne Murguia, PT [facility]   6/15/2023 10:00 AM Jimmie Melvi, PT [facility]   6/15/2023  2:00 PM Te Garcia MD [facility]   6/20/2023 10:00 AM Jimmie Melvi, PT [facility]   6/22/2023 10:00 AM Jimmie Melvi, PT [facility]   6/27/2023 10:00 AM Jimmie Melvi, PT [facility]   6/29/2023 10:00 AM Jimmie Melvi, PT [facility]     Richard Marte RN  Case Management  Ext:  [phone]  05/24/2023

## 2023-05-24 NOTE — PROGRESS NOTES
[facility] ([facility])  Neurology  Progress Note    Patient Name: Jayesh Rose  MRN: [id number]  Admission Date: 5/18/2023  Hospital Length of Stay: 6 days  Code Status: Full Code   Attending Provider: Jose Amezquita MD  Primary Care Physician: Du Shaw MD   Principal Problem:Encephalitis    HPI:   41 yo man. neurology initially cosulted for concerns of breakthrough seizures in the setting of signs and symptoms concerning for meningitis. He has a history of anxiety and possibly seizure disorder. Regarding his seizures. They are mostly GTC, per the patient's significant other and he follows at [facility]. He is on keppra 1 g bid. MRI in the past as well as EEG have been unremarkable, per the patient's significant other. He admitted to sometimes not take his keppra. He had multiple episodes today concerning for seizures. Besides this, he also complained of myalgias, neck pain. At arrival, he was found to be tachycardic. Have high WBC and high lactic acid. CT head showed no abnormalities. He was loaded with keppra and an LP was done given concerns for possible meningitis. our initial recommendations included continuing keppra 1 g bid and depending on LP results, let ID take over if infectious or call us back if non-specific and still symptomatic. LP results non specific but not concerning for infections. the patient is still having short term memory problems. the primary team got an MRI w wo c on 5/19 which showed difusse restriction of bilateral mesial temporal lobes.       Subjective:     Interval History:   IVIG day 5  Wife says memory slightly better today  Planned for discharge home this evening  Outpatient follow-up arranged 6/15    Current Facility-Administered Medications   Medication Dose Route Frequency Provider Last Rate Last Admin    0.9%  NaCl infusion   Intravenous Continuous Te Garcia MD 75 mL/hr at 05/24/23 0545 New Bag at 05/24/23 0545    acetaminophen tablet 1,000 mg   1,000 mg Oral Q6H PRN Miles Curlew, DO   1,000 mg at 05/24/23 1226    dextrose 10% bolus 125 mL 125 mL  12.5 g Intravenous PRN Theodora Zhu MD        dextrose 10% bolus 250 mL 250 mL  25 g Intravenous PRN Theodora Zhu MD        dextrose 40 % gel 15,000 mg  15 g Oral PRN Jose Amezquita MD        dextrose 40 % gel 30,000 mg  30 g Oral PRN Jose Amezquita MD        glucagon (human recombinant) injection 1 mg  1 mg Intramuscular PRN Velasquez Harringtonborn, DO        hydrOXYzine HCL tablet 25 mg  25 mg Oral TID PRN Elda Manzanares MD   25 mg at 05/23/23 1729    levETIRAcetam tablet 1,500 mg  1,500 mg Oral Q12H Velasquez Ozborn, DO   1,500 mg at 05/24/23 0852    LIDOcaine HCL 10 mg/ml (1%) injection 10 mL  10 mL Other On Call Procedure Jose Frankel MD        melatonin tablet 6 mg  6 mg Oral Nightly PRN Elda Manzanares MD   6 mg at 05/22/23 2130    naloxone 0.4 mg/mL injection 0.02 mg  0.02 mg Intravenous PRN Velasquez Ozborn, DO        ondansetron tablet 4 mg  4 mg Oral Q6H PRN Miles Armandoe, DO   4 mg at 05/24/23 1226    prochlorperazine tablet 5 mg  5 mg Oral QID PRN Velasquez Ozborn, DO   5 mg at 05/19/23 0951    sodium chloride 0.9% flush 10 mL  10 mL Intravenous Q12H PRN Velasquez Ozborn, DO        thiamine tablet 100 mg  100 mg Oral Daily Velasquez Ozborn, DO   100 mg at 05/24/23 0852     Review of Systems   Constitutional:  Positive for activity change and fatigue. Negative for fever.   HENT:  Negative for tinnitus, trouble swallowing and voice change.    Eyes:  Negative for visual disturbance.   Respiratory:  Negative for shortness of breath.    Cardiovascular:  Negative for chest pain and leg swelling.   Gastrointestinal:  Negative for vomiting.   Musculoskeletal:  Negative for neck stiffness.   Neurological:  Negative for dizziness, facial asymmetry and headaches.   Psychiatric/Behavioral:  Positive for confusion, decreased concentration and sleep disturbance.    Objective:     Vital Signs (Most  Recent):  Temp: 96.6 °F (35.9 °C) (05/24/23 1532)  Pulse: (!) 53 (05/24/23 1532)  Resp: 18 (05/24/23 1532)  BP: 137/83 (05/24/23 1532)  SpO2: 98 % (05/24/23 1532) Vital Signs (24h Range):  Temp:  [96.6 °F (35.9 °C)-99.8 °F (37.7 °C)] 96.6 °F (35.9 °C)  Pulse:  [47-78] 53  Resp:  [10-26] 18  SpO2:  [90 %-100 %] 98 %  BP: (129-145)/(81-94) 137/83     Weight: 99.8 kg (220 lb)  Body mass index is 29.84 kg/m².     Physical Exam  Eyes:      Extraocular Movements: EOM normal.      Pupils: Pupils are equal, round, and reactive to light.   Neurological:      Motor: Motor strength is normal.   Psychiatric:         Speech: Speech normal.        NEUROLOGICAL EXAMINATION:     MENTAL STATUS   Speech: speech is normal   Level of consciousness: alert       Oriented to self, wife, place and time  Able to recall family member birthdates  Says last few days are a blur but people in the hospital look familiar now     CRANIAL NERVES     CN III, IV, VI   Pupils are equal, round, and reactive to light.  Extraocular motions are normal.   Nystagmus: none   Diplopia: none  Ophthalmoparesis: none    CN V   Facial sensation intact.     CN VII   Facial expression full, symmetric.     CN VIII   Hearing: intact    CN IX, X   Palate: symmetric    CN XI   CN XI normal.     CN XII   CN XII normal.     MOTOR EXAM   Muscle bulk: normal  Overall muscle tone: normal    Strength   Strength 5/5 throughout.     SENSORY EXAM   Light touch normal.     GAIT AND COORDINATION     Gait  Gait: (deferred)    Tremor   Resting tremor: absent    Significant Labs: All pertinent lab results from the past 24 hours have been reviewed.    Significant Imaging: I have reviewed all pertinent imaging results/findings within the past 24 hours.    Assessment and Plan:     * Encephalitis  43 yo man with seizures was admitted with concerns of breakthrough seizures in the setting of abrupt change on 5/18. He was loaded with keppra and an LP x 2 (5/18 & 5/21) with noninfectious  CSF. Initial LP with slightly elevated protein (45), repeat unremarkable. MRI brain W WO contrast 5/19 with symmetric FLAIR diffusion restriction bilateral mesial temporal lobes without associated abnormal enhancement. Started on empiric IVIG on 5/20 for presumed autoimmune encephalitis while awaiting autoimmune/paraneoplastic panels.     5/24-5th and final IVIG infusion today  Able to retain info for longer today per wife  Planned for discharge home this evening    Recommendations:  --continue Keppra 1500 mg BID indefinitely  Can discuss when safe to wean as outpatient  Discussed seizure precautions and [place] state law, no driving for 6 months  --follow-up scheduled with Dr. Garcia 6/15  please repeat MRI brain W WO contrast prior to 6/15 visit so they can discuss interval change  Pending serum and CSF studies to be discussed at clinic visit as well  --ok to resume home zoloft 100 mg qd from Neuro standpoint    VTE Risk Mitigation (From admission, onward)         Ordered     IP VTE HIGH RISK PATIENT  Once         05/18/23 1744     Place sequential compression device  Until discontinued         05/18/23 1744              Rosa Desai PA-C  General Neurology Consult

## 2023-05-24 NOTE — PLAN OF CARE
Problem: Adult Inpatient Plan of Care  Goal: Plan of Care Review  Outcome: Ongoing, Progressing  Goal: Patient-Specific Goal (Individualized)  Outcome: Ongoing, Progressing  Goal: Absence of Hospital-Acquired Illness or Injury  Outcome: Ongoing, Progressing  Goal: Optimal Comfort and Wellbeing  Outcome: Ongoing, Progressing  Goal: Readiness for Transition of Care  Outcome: Ongoing, Progressing     Problem: Impaired Wound Healing  Goal: Optimal Wound Healing  Outcome: Ongoing, Progressing     Problem: Fall Injury Risk  Goal: Absence of Fall and Fall-Related Injury  Outcome: Ongoing, Progressing     Problem: Self-Care Deficit  Goal: Improved Ability to Complete Activities of Daily Living  Outcome: Ongoing, Progressing     Problem: Hypertension Acute  Goal: Blood Pressure Within Desired Range  Outcome: Ongoing, Progressing     Problem: Confusion Acute  Goal: Optimal Cognitive Function  Outcome: Ongoing, Progressing     Problem: Seizure, Active Management  Goal: Absence of Seizure/Seizure-Related Injury  Outcome: Ongoing, Progressing     Problem: Fever  Goal: Body Temperature in Desired Range  Outcome: Ongoing, Progressing     Problem: Cognitive Impairment (Dementia Signs/Symptoms)  Goal: Optimized Cognitive Function (Dementia Signs/Symptoms)  Outcome: Ongoing, Progressing   Pt A&ox4 but forgetful at times, receiving IVIG. NS at  75 ml hr cont. Gait steady with sba . Has difficulty swallowing large pills like keppra so crushed second pill. VSS. Call bell in reach. Pt instructed to call for assist. Reoriented as necessary

## 2023-05-24 NOTE — PLAN OF CARE
CHW met with patient/family at bedside. Patient experience rounding completed and reviewed the following.     Do you know your discharge plan? Yes    If yes, what is the plan? Home    If you are discharging home, do you have help at home? Yes     Do you think you will need help at home at discharge? No     Have you discussed your needs and preferences with your SW/CM? Yes     Assigned SW/CM notified of any patient/family needs or concerns.

## 2023-05-24 NOTE — ASSESSMENT & PLAN NOTE
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 30 Bullet 3   • Phenylephrine-Pheniramine-DM (THERAFLU COLD & COUGH PO) Take  by mouth.     • ibuprofen (MOTRIN) 100 MG/5ML SUSP Take 10 mL by mouth every 6 hours as needed (pain). 240 mL 0     No current facility-administered medications for this visit.         No known drug allergy      Family History   Problem Relation Age of Onset   • No Known Problems Mother    • No Known Problems Father    • No Known Problems Sister    • No Known Problems Brother    • No Known Problems Maternal Grandmother    • Cancer Maternal Grandfather 44        colon   • No Known Problems Paternal Grandmother    • No Known Problems Paternal Grandfather        No past surgical history on file.    ROS:    See HPI above. All other systems were reviewed and are negative.    /70   Pulse 120   Temp (!) 35.6 °C (96 °F)   Resp 20   Ht 1.575 m (5' 2.01\")   Wt 75.5 kg (166 lb 7.2 oz)   SpO2 98%   BMI 30.44 kg/m²     Physical Exam:  Gen:  Alert, active, well appearing  HEENT:  PERRLA, TM's clear b/l, erythema in canal without drainage  oropharynx with no erythema or exudate  Neck:  Supple, FROM without tenderness, no lymphadenopathy  Lungs:  Clear to auscultation bilaterally, no " -atarax PRN  -Holding SSRI to limit psychoactive medications  -Avoid seizure theshold lowering medications     wheezes/rales/rhonchi  CV:  Regular rate and rhythm. Normal S1/S2.  No murmurs.  Good pulses throughout.  Brisk capillary refill.  Abd:  Soft non tender, non distended. Normal active bowel sounds.  No rebound or                    guarding.  No hepatosplenomegaly.  Ext:  WWP, no cyanosis, no edema  Skin:  No rashes or bruising.      Assessment and Plan:  1. Acute otitis externa of right ear, unspecified type  Explained the etiology & pathogenesis of otitis externa/swimmer's ear. Provided parent/patient with instructions on drop instillation. Encouraged pt to avoid exposure to water at this time, no swimming, no submerging head in the bathtub for 7 to 10 days after treatment. We discussed putting cotton balls into the ears while showering. We discussed risk factors associated with OE to include frequent removal of wax/trauma to the EAC, swimming, and frequent use of ear plugs, headphones, etc. Pt to RTC if no improvement, fever >101.5 for > 4d, persistent pain, or for any other concerns.   - ciprofloxacin (CILOXIN) 0.3 % Solution; Place 1 Drop in both eyes 2 times a day for 10 days.  Dispense: 1 mL; Refill: 0

## 2023-05-24 NOTE — SUBJECTIVE & OBJECTIVE
Subjective:     Interval History:   IVIG day 5  Wife says memory slightly better today  Planned for discharge home this evening  Outpatient follow-up arranged 6/15    Current Facility-Administered Medications   Medication Dose Route Frequency Provider Last Rate Last Admin    0.9%  NaCl infusion   Intravenous Continuous Te Garcia MD 75 mL/hr at 05/24/23 0545 New Bag at 05/24/23 0545    acetaminophen tablet 1,000 mg  1,000 mg Oral Q6H PRN Miles Beavers, DO   1,000 mg at 05/24/23 1226    dextrose 10% bolus 125 mL 125 mL  12.5 g Intravenous PRN Theodora Zhu MD        dextrose 10% bolus 250 mL 250 mL  25 g Intravenous PRN Theodora Zhu MD        dextrose 40 % gel 15,000 mg  15 g Oral PRN Jose Amezquita MD        dextrose 40 % gel 30,000 mg  30 g Oral PRN Jose Amezquita MD        glucagon (human recombinant) injection 1 mg  1 mg Intramuscular PRN Velasquez Mas, DO        hydrOXYzine HCL tablet 25 mg  25 mg Oral TID PRN Elda Manzanares MD   25 mg at 05/23/23 1729    levETIRAcetam tablet 1,500 mg  1,500 mg Oral Q12H Velasquez Mas, DO   1,500 mg at 05/24/23 0852    LIDOcaine HCL 10 mg/ml (1%) injection 10 mL  10 mL Other On Call Procedure Jose Frankel MD        melatonin tablet 6 mg  6 mg Oral Nightly PRN Elda Manzanares MD   6 mg at 05/22/23 2130    naloxone 0.4 mg/mL injection 0.02 mg  0.02 mg Intravenous PRN Velasquez Mas, DO        ondansetron tablet 4 mg  4 mg Oral Q6H PRN Miles Beavers DO   4 mg at 05/24/23 1226    prochlorperazine tablet 5 mg  5 mg Oral QID PRN Velasquez Mas, DO   5 mg at 05/19/23 0951    sodium chloride 0.9% flush 10 mL  10 mL Intravenous Q12H PRN Velasquez Mas, DO        thiamine tablet 100 mg  100 mg Oral Daily Velasquez Ozborn, DO   100 mg at 05/24/23 0852     Review of Systems   Constitutional:  Positive for activity change and fatigue. Negative for fever.   HENT:  Negative for tinnitus, trouble swallowing and voice change.    Eyes:  Negative for visual disturbance.    Respiratory:  Negative for shortness of breath.    Cardiovascular:  Negative for chest pain and leg swelling.   Gastrointestinal:  Negative for vomiting.   Musculoskeletal:  Negative for neck stiffness.   Neurological:  Negative for dizziness, facial asymmetry and headaches.   Psychiatric/Behavioral:  Positive for confusion, decreased concentration and sleep disturbance.    Objective:     Vital Signs (Most Recent):  Temp: 96.6 °F (35.9 °C) (05/24/23 1532)  Pulse: (!) 53 (05/24/23 1532)  Resp: 18 (05/24/23 1532)  BP: 137/83 (05/24/23 1532)  SpO2: 98 % (05/24/23 1532) Vital Signs (24h Range):  Temp:  [96.6 °F (35.9 °C)-99.8 °F (37.7 °C)] 96.6 °F (35.9 °C)  Pulse:  [47-78] 53  Resp:  [10-26] 18  SpO2:  [90 %-100 %] 98 %  BP: (129-145)/(81-94) 137/83     Weight: 99.8 kg (220 lb)  Body mass index is 29.84 kg/m².     Physical Exam  Eyes:      Extraocular Movements: EOM normal.      Pupils: Pupils are equal, round, and reactive to light.   Neurological:      Motor: Motor strength is normal.   Psychiatric:         Speech: Speech normal.        NEUROLOGICAL EXAMINATION:     MENTAL STATUS   Speech: speech is normal   Level of consciousness: alert       Oriented to self, wife, place and time  Able to recall family member birthdates  Says last few days are a blur but people in the hospital look familiar now     CRANIAL NERVES     CN III, IV, VI   Pupils are equal, round, and reactive to light.  Extraocular motions are normal.   Nystagmus: none   Diplopia: none  Ophthalmoparesis: none    CN V   Facial sensation intact.     CN VII   Facial expression full, symmetric.     CN VIII   Hearing: intact    CN IX, X   Palate: symmetric    CN XI   CN XI normal.     CN XII   CN XII normal.     MOTOR EXAM   Muscle bulk: normal  Overall muscle tone: normal    Strength   Strength 5/5 throughout.     SENSORY EXAM   Light touch normal.     GAIT AND COORDINATION     Gait  Gait: (deferred)    Tremor   Resting tremor: absent    Significant Labs:  All pertinent lab results from the past 24 hours have been reviewed.    Significant Imaging: I have reviewed all pertinent imaging results/findings within the past 24 hours.

## 2023-05-24 NOTE — PLAN OF CARE
[facility] completed LOCET via phone. [facility] faxed PASSR to obtain the 142 for NH admission.    YURY Aguila  [phone]

## 2023-05-24 NOTE — SUBJECTIVE & OBJECTIVE
Interval History: Send out labs from LP pending. Appears to be remembering more today.    Review of Systems   Reason unable to perform ROS: supplemented by wife, AMS.   Constitutional:  Negative for chills and fever.   HENT:  Negative for congestion and trouble swallowing.    Eyes:  Negative for redness and visual disturbance.   Respiratory:  Negative for cough.    Cardiovascular:  Negative for chest pain and leg swelling.   Gastrointestinal:  Positive for nausea. Negative for abdominal pain, constipation and diarrhea.   Genitourinary:  Negative for difficulty urinating and dysuria.   Musculoskeletal:  Positive for back pain (chronic) and neck pain (chronic).   Skin:  Negative for rash and wound.   Neurological:  Negative for dizziness and headaches.   Psychiatric/Behavioral:  Positive for confusion and decreased concentration. The patient is nervous/anxious.    Objective:     Vital Signs (Most Recent):  Temp: 98.1 °F (36.7 °C) (05/24/23 0800)  Pulse: 78 (05/24/23 0800)  Resp: 20 (05/24/23 0800)  BP: 130/81 (05/24/23 0800)  SpO2: 100 % (05/24/23 0800) Vital Signs (24h Range):  Temp:  [98.1 °F (36.7 °C)-99.8 °F (37.7 °C)] 98.1 °F (36.7 °C)  Pulse:  [47-78] 78  Resp:  [10-26] 20  SpO2:  [90 %-100 %] 100 %  BP: (128-145)/(81-94) 130/81     Weight: 99.8 kg (220 lb)  Body mass index is 29.84 kg/m².  No intake or output data in the 24 hours ending 05/24/23 0918        Physical Exam  Constitutional:       General: He is not in acute distress.     Appearance: Normal appearance. He is not ill-appearing, toxic-appearing or diaphoretic.   HENT:      Head: Normocephalic and atraumatic.      Mouth/Throat:      Mouth: Mucous membranes are moist.      Tongue: Lesions present.   Eyes:      Extraocular Movements: Extraocular movements intact.      Conjunctiva/sclera: Conjunctivae normal.      Pupils: Pupils are equal, round, and reactive to light.   Cardiovascular:      Rate and Rhythm: Normal rate and regular rhythm.      Pulses:  Normal pulses.      Heart sounds: No murmur heard.  Pulmonary:      Effort: Pulmonary effort is normal. No respiratory distress.      Breath sounds: Normal breath sounds. No wheezing, rhonchi or rales.   Abdominal:      General: Abdomen is flat. Bowel sounds are normal. There is no distension.      Palpations: Abdomen is soft. There is no mass.   Musculoskeletal:      Cervical back: Neck supple. No rigidity.      Right lower leg: No edema.      Left lower leg: No edema.   Skin:     General: Skin is warm and dry.      Capillary Refill: Capillary refill takes less than 2 seconds.      Findings: No bruising or rash.   Neurological:      Mental Status: He is alert. He is disoriented.      Comments: Oriented to person and place. Disoriented to time.    Asks repeated questions with poor short term memory.    Occasionally becomes anxious with diaphoresis   Psychiatric:      Comments: Anxious about health.           Significant Labs: All pertinent labs within the past 24 hours have been reviewed.  CBC:   Recent Labs   Lab 05/24/23  0329   WBC 5.65   HGB 12.9*   HCT 38.6*          CMP:   Recent Labs   Lab 05/23/23  0331 05/24/23  0329    140   K 3.0* 3.4*   * 109   CO2 23 23   GLU 83 82   BUN 13 12   CREATININE 1.1 1.0   CALCIUM 8.2* 8.3*   PROT 7.5 7.8   ALBUMIN 2.8* 3.0*   BILITOT 0.3 0.3   ALKPHOS 47* 50*   AST 15 20   ALT 13 19   ANIONGAP 7* 8         Significant Imaging: I have reviewed all pertinent imaging results/findings within the past 24 hours.

## 2023-05-24 NOTE — ASSESSMENT & PLAN NOTE
41 yo man with seizures was admitted with concerns of breakthrough seizures in the setting of abrupt change on 5/18. He was loaded with keppra and an LP x 2 (5/18 & 5/21) with noninfectious CSF. Initial LP with slightly elevated protein (45), repeat unremarkable. MRI brain W WO contrast 5/19 with symmetric FLAIR diffusion restriction bilateral mesial temporal lobes without associated abnormal enhancement. Started on empiric IVIG on 5/20 for presumed autoimmune encephalitis while awaiting autoimmune/paraneoplastic panels.     5/24-5th and final IVIG infusion today  Able to retain info for longer today per wife  Planned for discharge home this evening    Recommendations:  --continue Keppra 1500 mg BID indefinitely  Can discuss when safe to wean as outpatient  Discussed seizure precautions and [place] state law, no driving for 6 months  --follow-up scheduled with Dr. Garcia 6/15  please repeat MRI brain W WO contrast prior to 6/15 visit so they can discuss interval change  Pending serum and CSF studies to be discussed at clinic visit as well  --ok to resume home zoloft 100 mg qd from Neuro standpoint

## 2023-05-24 NOTE — PROGRESS NOTES
"[facility] - Neurosurgery ([facility])  [facility] Medicine  Progress Note    Patient Name: Jayesh Rose  MRN: [id number]  Patient Class: IP- Inpatient   Admission Date: 5/18/2023  Length of Stay: 6 days  Attending Physician: Jose Amezquita MD  Primary Care Provider: Du Shaw MD        Subjective:     Principal Problem:Encephalitis        HPI:  Patient is a 43 yo male with a PMH of anxiety, GERD, and seizure disorder on Keppra who [place] to [facility] with altered mental status per wife since waking up the morning of admission. She reports that he has had confusion, retrograde amnesia, and abnormal behavior since this morning. She left for work and received odd messages from him that did not make since so she returned home and found that his confusion and amnesia were worse and he was frothing from the mouth. She states that he was out drinking at a bar with a friend the night before, something he commonly dose, and came back around midnight and seemed in usual state of health. She asked the friend today and friend states there was nothing unusual about their outing. He has a history of tonic clonic seizures that he has been free from for the past several months although last year had to increase the dose of his keppra for breakthrough seizures. His seizures are typically preceded by a prodrome that the patient can feel coming on and followed by a short post ictal state that may involve some confusion, although wife states this current episode is nothing like seizures he has had in the past. He takes his seizure medication daily but often not twice a day as prescribed. Patient is alert with very poor short term memory. He does expresses recent headaches that he is unable to say are different or the same as headaches that he often gets and pain in his neck, back, and hips that is acute and chronic. He repeatedly asks the same questions such as "Why am I here?". He had an episode of vomiting earlier in the " morning. He denies chest pain, shortness of breath, vision changes, or dizziness. He denies sick contacts, any recent travel, recent trauma, and they do not own pets.     He has no smoking history, he does admit to occasional binge drinking of around maybe 8 drinks every once in a while and admits to occasional marijuana use last used 2 weeks ago but no other illicit drug use. He lives with his wife and their young child who have had no abnormal symptoms.    ED course:Vitals stable with high BP and pulse. WBC 25.5. Lactate 6.0. Neurology consulted and LP performed for concern of possible meningitis. CT head unremarkable.       Overview/Hospital Course:  Patient with history of seizures on keppra presented with altered mental status and confusion with short term memory loss after potential seizure activity. Has other symptoms including nausea, diaphoresis, and fever. Admitted for concern of seizure activity with potential meningitis. LP performed but was not suspicious for meningitis. Neurology consulted and MRI brain obtained. MRI brain showed evidence of diffuse restriction in bilateral temporal lobes suspicious for autoimmune encephalitis but can also be seen in postictal encephalitics. ID states low suspicion for infectious etiology. Antimicrobials discontinued and leukocytosis continued to resolve. IVIG started on 5/20 with plans for 5 day course. Repeat LP done, labs are pending. EEG from 5/20 showing one L temporal seizure and ictal rhythmic changes so pt loaded with 2g Keppra by neurology. No further seizures on EEG. Continued on keppra 1500 BID.      Interval History: Send out labs from LP pending. Appears to be remembering more today.    Review of Systems   Reason unable to perform ROS: supplemented by wife, AMS.   Constitutional:  Negative for chills and fever.   HENT:  Negative for congestion and trouble swallowing.    Eyes:  Negative for redness and visual disturbance.   Respiratory:  Negative for cough.     Cardiovascular:  Negative for chest pain and leg swelling.   Gastrointestinal:  Positive for nausea. Negative for abdominal pain, constipation and diarrhea.   Genitourinary:  Negative for difficulty urinating and dysuria.   Musculoskeletal:  Positive for back pain (chronic) and neck pain (chronic).   Skin:  Negative for rash and wound.   Neurological:  Negative for dizziness and headaches.   Psychiatric/Behavioral:  Positive for confusion and decreased concentration. The patient is nervous/anxious.    Objective:     Vital Signs (Most Recent):  Temp: 98.1 °F (36.7 °C) (05/24/23 0800)  Pulse: 78 (05/24/23 0800)  Resp: 20 (05/24/23 0800)  BP: 130/81 (05/24/23 0800)  SpO2: 100 % (05/24/23 0800) Vital Signs (24h Range):  Temp:  [98.1 °F (36.7 °C)-99.8 °F (37.7 °C)] 98.1 °F (36.7 °C)  Pulse:  [47-78] 78  Resp:  [10-26] 20  SpO2:  [90 %-100 %] 100 %  BP: (128-145)/(81-94) 130/81     Weight: 99.8 kg (220 lb)  Body mass index is 29.84 kg/m².  No intake or output data in the 24 hours ending 05/24/23 0918        Physical Exam  Constitutional:       General: He is not in acute distress.     Appearance: Normal appearance. He is not ill-appearing, toxic-appearing or diaphoretic.   HENT:      Head: Normocephalic and atraumatic.      Mouth/Throat:      Mouth: Mucous membranes are moist.      Tongue: Lesions present.   Eyes:      Extraocular Movements: Extraocular movements intact.      Conjunctiva/sclera: Conjunctivae normal.      Pupils: Pupils are equal, round, and reactive to light.   Cardiovascular:      Rate and Rhythm: Normal rate and regular rhythm.      Pulses: Normal pulses.      Heart sounds: No murmur heard.  Pulmonary:      Effort: Pulmonary effort is normal. No respiratory distress.      Breath sounds: Normal breath sounds. No wheezing, rhonchi or rales.   Abdominal:      General: Abdomen is flat. Bowel sounds are normal. There is no distension.      Palpations: Abdomen is soft. There is no mass.   Musculoskeletal:       Cervical back: Neck supple. No rigidity.      Right lower leg: No edema.      Left lower leg: No edema.   Skin:     General: Skin is warm and dry.      Capillary Refill: Capillary refill takes less than 2 seconds.      Findings: No bruising or rash.   Neurological:      Mental Status: He is alert. He is disoriented.      Comments: Oriented to person and place. Disoriented to time.    Asks repeated questions with poor short term memory.    Occasionally becomes anxious with diaphoresis   Psychiatric:      Comments: Anxious about health.           Significant Labs: All pertinent labs within the past 24 hours have been reviewed.  CBC:   Recent Labs   Lab 05/24/23 0329   WBC 5.65   HGB 12.9*   HCT 38.6*          CMP:   Recent Labs   Lab 05/23/23 0331 05/24/23 0329    140   K 3.0* 3.4*   * 109   CO2 23 23   GLU 83 82   BUN 13 12   CREATININE 1.1 1.0   CALCIUM 8.2* 8.3*   PROT 7.5 7.8   ALBUMIN 2.8* 3.0*   BILITOT 0.3 0.3   ALKPHOS 47* 50*   AST 15 20   ALT 13 19   ANIONGAP 7* 8         Significant Imaging: I have reviewed all pertinent imaging results/findings within the past 24 hours.      Assessment/Plan:      * Encephalitis  -See seizure       Anxiety  -atarax PRN  -Holding SSRI to limit psychoactive medications  -Avoid seizure theshold lowering medications      Encephalopathy, metabolic  -See seizure for plan.      Seizure  Patient has hx of seizures. Was admitted with seizure like activity with prolonged post ictal period. This seizure is unlike previous episodes. WBC 25.5. Lactate 6.0. Lumbar puncture performed and initial labs positive for slightly elevated glucose and protein but normal WBC not suspicious for bacterial meningitis. CT head unremarkable.       Differential includes seizure with post ictal state vs encephalitis vs autoimmune or oncologic pathology.    WBC and lactate improving. UDS negative. MRI brain suspicious for autoimmune vs postictal encephalitis.     Plan:    -Continuing keppra 1.5g BID  -Neurology consulted, recs appreciated. On IVIG for 5 day course to end 5/24.   -Thiamine supplementation      DDD (degenerative disc disease), lumbar  -PRNs for pain      GERD (gastroesophageal reflux disease)  Stable.  -Holding PPI inpatient         VTE Risk Mitigation (From admission, onward)         Ordered     enoxaparin injection 40 mg  Daily         05/18/23 1744     IP VTE HIGH RISK PATIENT  Once         05/18/23 1744     Place sequential compression device  Until discontinued         05/18/23 1744                Discharge Planning   ALEJA: 5/25/2023     Code Status: Full Code   Is the patient medically ready for discharge?: No    Reason for patient still in hospital (select all that apply): Patient trending condition and Treatment                     Miles Walters MD  Department of Hospital Medicine   [facility] - Neurosurgery ([facility])

## 2023-05-24 NOTE — MEDICAL/APP STUDENT
"  INPATIENT PROGRESS NOTE  [facility] [place] 4      Admitted on 5/18/2023   Hospital Day: 7     SUBJECTIVE:               Pt is a 43y/o male with a history of anxiety, seizure disorder, and chronic back pain. He presents with confusion/amnesia and persistently altered mental status following an unwitnessed seizure this morning. Patient's wife (Lakesha) reports that patient went out drinking with a friend last night and returned around midnight. This morning she noted that he had an episode of vomiting and appeared diaphoretic and feverish afterwards. She states that after she left for work he began sending text messages that "made no sense" stating that he was "asleep." At some point after returning home due to concerns about his confusion she found him obtunded, "foaming at the mouth," and not verbally responsive but able to follow some commands. Per wife pt is on Keppra for seizures and sees neurologist at [facility] but does not regularly take full dose as prescribed. Last seizure reported to be approx 1 year ago. Pt states he gets chronic headaches as well as neck and back pain but is unable to say at this time whether this pain is changed from baseline. He currently denies any nausea, vomiting, chills, abdominal pain, neck stiffness, photophobia, sore throat, or cough. His wife states she did not note him having any symptoms of respiratory infection over the past week and per her knowledge he has not been exposed to any sick contacts. Denies recent travel. Patient is an unreliable historian as throughout interview he continues to exhibit short term memory deficits and disorientation to time (states that year is 2021; continues to repeat questions).          Interval history: Pt reporting improvements in confusion and memory today. Able to recall some events from the past week, states that he knows he had a seizure. Otherwise without complains of any pain or nausea. No acute events overnight.      Please refer to H&P for " comprehensive past medical, social, and family history.    Review of Systems   Constitutional:  Negative for chills and fever.   HENT:  Negative for sore throat.    Eyes:  Negative for blurred vision, double vision and photophobia.   Respiratory:  Negative for cough, hemoptysis and stridor.    Cardiovascular:  Negative for chest pain and palpitations.   Gastrointestinal:  Positive for nausea. Negative for abdominal pain and vomiting.   Musculoskeletal:  Negative for myalgias and neck pain.   Skin:  Negative for rash.   Neurological:  Negative for dizziness and headaches.   Psychiatric/Behavioral:  Positive for memory loss. The patient is nervous/anxious.        OBJECTIVE:     Vital Signs Recent:  Temp: 98.1 °F (36.7 °C) (05/24/23 0800)  Pulse: 78 (05/24/23 0800)  Resp: 20 (05/24/23 0800)  BP: 130/81 (05/24/23 0800)  SpO2: 100 % (05/24/23 0800)  Oxygen Documentation:    Flow (L/min): 4           Device (Oxygen Therapy): room air  $ Is the patient on Low Flow Oxygen?: Yes      Vital Signs Range (Last 24H):  Temp:  [98.1 °F (36.7 °C)-99.8 °F (37.7 °C)]   Pulse:  [47-78]   Resp:  [10-26]   BP: (128-145)/(81-94)   SpO2:  [90 %-100 %]        I & O (Last 24H):No intake or output data in the 24 hours ending 05/24/23 0913         Physical Exam  HENT:      Head: Normocephalic and atraumatic.   Eyes:      Extraocular Movements: Extraocular movements intact.      Pupils: Pupils are equal, round, and reactive to light.   Cardiovascular:      Rate and Rhythm: Normal rate and regular rhythm.   Pulmonary:      Effort: Pulmonary effort is normal.      Breath sounds: Normal breath sounds. No wheezing.   Abdominal:      General: Abdomen is flat. Bowel sounds are normal. There is no distension.      Palpations: Abdomen is soft.   Musculoskeletal:      Cervical back: Normal range of motion. No rigidity.   Skin:     Coloration: Skin is not jaundiced.      Findings: No lesion or rash.   Neurological:      Mental Status: He is alert. He is  disoriented.      Cranial Nerves: No cranial nerve deficit.      Comments: Disoriented to time and place.          Labs:   Recent Labs   Lab 05/22/23  0252 05/23/23  0331 05/24/23  0329    141 140   K 3.6 3.0* 3.4*   * 111* 109   CO2 23 23 23   BUN 15 13 12   CREATININE 1.1 1.1 1.0   GLU 84 83 82   CALCIUM 8.2* 8.2* 8.3*   MG  --  2.0 1.9   PHOS  --  3.4 3.5       Recent Labs   Lab 05/22/23  0252 05/23/23  0331 05/24/23  0329   ALKPHOS 54* 47* 50*   ALT 15 13 19   AST 15 15 20   ALBUMIN 3.1* 2.8* 3.0*   PROT 7.3 7.5 7.8   BILITOT 0.4 0.3 0.3         Lab Results   Component Value Date    WBC 5.65 05/24/2023    HGB 12.9 (L) 05/24/2023    HCT 38.6 (L) 05/24/2023    MCV 77 (L) 05/24/2023     05/24/2023           ASSESSMENT/PLAN:      Assessment : Jayesh Rose is a 42 y.o. male admitted for Encephalitis        *Encephelopathy  Pt with history of seizure disorder with presentation suspicious for breakthrough seizure and subsequent encephalopathy, now suspected 2/2 autoimmune process vs infection. MRI w/contrast suggestive of autoimmune encephalitis,  Repeat LP remarkable only for mildly elevated segmented neutrophils (16, decreased from prior 32)              -Empiric acyclovir and ceftriaxone given for mengingitis in ED              -F/u remaining repeat LP results and bcx    -meningitis/encephalitis panel negative    -f/u remaining csf studies  -Neuro consulted, following recs                          -keppra increased to 1500g BID              -Continue to monitor for fever or changes in symptoms        -neuro checks q4    -continue IVIG (day 5 of 5)  -thiamine supplement 100mg daily    Acute kidney injury  Likely 2/2 sepsis and/or poor PO fluid intake   -maintenance IVF   -f/u CMP, CPK   -will continue to trend creatinine   -avoid NSAIDs and any nephrotoxic agents  Lab Results   Component Value Date/Time    CREATININE 1.0 05/24/2023 03:29 AM    CREATININE 1.1 05/23/2023 03:31 AM     CREATININE 1.1 05/22/2023 02:52 AM       Seizure disorder  Pt with reported history of seizure disorder. Follows with neurologist at [facility], but not fully compliant with medication   -cont. Home Keppra 1.5g BID   -neuro checks q4   -seizure precautions        Anxiety disorder  Pt reports recent increase in frequency of panic attacks. Does not c/o symptoms of anxiety at this time.              -Holding home SSRI for now in the setting of altered mentation and neurological concerns              -Hydroxyzine 25mg prn    Dysphagia  Pt with complaints of dysphagia, potentially related to anxiety. Per family, has been a long term issue.    -encourage PO intake, adding order for boost nutritional supplement   -anxiety meds prn as above        Lumbar Degenerative Disc Disease              -Holding home mobic and robaxin for now due to altered mentation              -Will continue to monitor for pain and reassess plan as needed           VTE Risk Mitigation (From admission, onward)              Ordered       enoxaparin injection 40 mg  Daily         05/18/23 1744       IP VTE HIGH RISK PATIENT  Once         05/18/23 1744       Place sequential compression device  Until discontinued         05/18/23 1744                             Kori Rabago MS4  [employer]

## 2023-05-24 NOTE — ASSESSMENT & PLAN NOTE
Patient has hx of seizures. Was admitted with seizure like activity with prolonged post ictal period. This seizure is unlike previous episodes. WBC 25.5. Lactate 6.0. Lumbar puncture performed and initial labs positive for slightly elevated glucose and protein but normal WBC not suspicious for bacterial meningitis. CT head unremarkable.       Differential includes seizure with post ictal state vs encephalitis vs autoimmune or oncologic pathology.    WBC and lactate improving. UDS negative. MRI brain suspicious for autoimmune vs postictal encephalitis.     Plan:   -Continuing keppra 1.5g BID  -Neurology consulted, recs appreciated. On IVIG for 5 day course to end 5/24.   -Thiamine supplementation

## 2023-05-25 ENCOUNTER — PATIENT MESSAGE (OUTPATIENT)
Dept: NEUROLOGY | Facility: CLINIC | Age: 43
End: 2023-05-25
Payer: COMMERCIAL

## 2023-05-25 ENCOUNTER — TELEPHONE (OUTPATIENT)
Dept: NEUROLOGY | Facility: CLINIC | Age: 43
End: 2023-05-25
Payer: COMMERCIAL

## 2023-05-25 NOTE — PLAN OF CARE
Problem: Adult Inpatient Plan of Care  Goal: Plan of Care Review  Outcome: Met  Goal: Patient-Specific Goal (Individualized)  Outcome: Met  Goal: Absence of Hospital-Acquired Illness or Injury  Outcome: Met  Goal: Optimal Comfort and Wellbeing  Outcome: Met  Goal: Readiness for Transition of Care  Outcome: Met     Problem: Impaired Wound Healing  Goal: Optimal Wound Healing  Outcome: Met     Problem: Fall Injury Risk  Goal: Absence of Fall and Fall-Related Injury  Outcome: Met     Problem: Hypertension Acute  Goal: Blood Pressure Within Desired Range  Outcome: Met     Problem: Confusion Acute  Goal: Optimal Cognitive Function  Outcome: Met     Problem: Seizure, Active Management  Goal: Absence of Seizure/Seizure-Related Injury  Outcome: Met     Problem: Fever  Goal: Body Temperature in Desired Range  Outcome: Met     Problem: Cognitive Impairment (Dementia Signs/Symptoms)  Goal: Optimized Cognitive Function (Dementia Signs/Symptoms)  Outcome: Met

## 2023-05-25 NOTE — NURSING
Patient discharged to home in care of wife.  Patient verbalized understanding of teaching given concerning follow up appointments and physical therapy.  Medications delivered from outpatient pharmacy.

## 2023-05-25 NOTE — TELEPHONE ENCOUNTER
LVM for the patient. Offered next available appointment with Dr Garcia in resident clinic, Wednesday 6/14 at 130 pm, with Dr Berumen as faculty. Left direct contact information and sent portal message as well.

## 2023-05-26 LAB
BACTERIA BLD CULT: NORMAL
BACTERIA BLD CULT: NORMAL
BACTERIA CSF CULT: NO GROWTH
GRAM STN SPEC: NORMAL
WNV RNA CSF QL NAA+PROBE: NEGATIVE
WNV RNA CSF QL NAA+PROBE: NEGATIVE

## 2023-05-29 LAB — NMO/AQP4 FACS,CSF: NEGATIVE

## 2023-05-30 ENCOUNTER — PATIENT MESSAGE (OUTPATIENT)
Dept: NEUROLOGY | Facility: CLINIC | Age: 43
End: 2023-05-30
Payer: COMMERCIAL

## 2023-05-30 LAB
AMPHIPHYSIN AB TITR CSF: NEGATIVE {TITER}
ANNOTATION COMMENT IMP: NORMAL
CV2 IGG TITR CSF: NEGATIVE {TITER}
GLIAL NUC TYPE 1 AB TITR CSF: NEGATIVE {TITER}
HU1 AB TITR CSF IF: NEGATIVE {TITER}
HU2 AB TITR CSF IF: NEGATIVE {TITER}
HU3 AB TITR CSF: NEGATIVE {TITER}
PARANEOPLASTIC INTERPRETATION,CSF: NORMAL
PCA-2 AB TITR CSF: NEGATIVE {TITER}
PCA-TR AB TITR CSF: NEGATIVE {TITER}
PURKINJE CELLS AB TITR CSF IF: NEGATIVE {TITER}

## 2023-06-01 ENCOUNTER — OFFICE VISIT (OUTPATIENT)
Dept: INTERNAL MEDICINE | Facility: CLINIC | Age: 43
End: 2023-06-01
Payer: COMMERCIAL

## 2023-06-01 ENCOUNTER — TELEPHONE (OUTPATIENT)
Dept: INTERNAL MEDICINE | Facility: CLINIC | Age: 43
End: 2023-06-01

## 2023-06-01 VITALS
HEART RATE: 62 BPM | DIASTOLIC BLOOD PRESSURE: 88 MMHG | WEIGHT: 190.5 LBS | HEIGHT: 70 IN | TEMPERATURE: 97 F | BODY MASS INDEX: 27.27 KG/M2 | SYSTOLIC BLOOD PRESSURE: 130 MMHG | OXYGEN SATURATION: 99 %

## 2023-06-01 DIAGNOSIS — G04.81 AUTOIMMUNE ENCEPHALITIS: ICD-10-CM

## 2023-06-01 DIAGNOSIS — Z09 HOSPITAL DISCHARGE FOLLOW-UP: Primary | ICD-10-CM

## 2023-06-01 DIAGNOSIS — R56.9 SEIZURE: ICD-10-CM

## 2023-06-01 PROCEDURE — 1159F MED LIST DOCD IN RCRD: CPT | Mod: CPTII,S$GLB,, | Performed by: INTERNAL MEDICINE

## 2023-06-01 PROCEDURE — 1111F PR DISCHARGE MEDS RECONCILED W/ CURRENT OUTPATIENT MED LIST: ICD-10-PCS | Mod: CPTII,S$GLB,, | Performed by: INTERNAL MEDICINE

## 2023-06-01 PROCEDURE — 99214 PR OFFICE/OUTPT VISIT, EST, LEVL IV, 30-39 MIN: ICD-10-PCS | Mod: S$GLB,,, | Performed by: INTERNAL MEDICINE

## 2023-06-01 PROCEDURE — 3079F DIAST BP 80-89 MM HG: CPT | Mod: CPTII,S$GLB,, | Performed by: INTERNAL MEDICINE

## 2023-06-01 PROCEDURE — 99214 OFFICE O/P EST MOD 30 MIN: CPT | Mod: S$GLB,,, | Performed by: INTERNAL MEDICINE

## 2023-06-01 PROCEDURE — 3008F PR BODY MASS INDEX (BMI) DOCUMENTED: ICD-10-PCS | Mod: CPTII,S$GLB,, | Performed by: INTERNAL MEDICINE

## 2023-06-01 PROCEDURE — 1159F PR MEDICATION LIST DOCUMENTED IN MEDICAL RECORD: ICD-10-PCS | Mod: CPTII,S$GLB,, | Performed by: INTERNAL MEDICINE

## 2023-06-01 PROCEDURE — 3075F SYST BP GE 130 - 139MM HG: CPT | Mod: CPTII,S$GLB,, | Performed by: INTERNAL MEDICINE

## 2023-06-01 PROCEDURE — 1160F RVW MEDS BY RX/DR IN RCRD: CPT | Mod: CPTII,S$GLB,, | Performed by: INTERNAL MEDICINE

## 2023-06-01 PROCEDURE — 3079F PR MOST RECENT DIASTOLIC BLOOD PRESSURE 80-89 MM HG: ICD-10-PCS | Mod: CPTII,S$GLB,, | Performed by: INTERNAL MEDICINE

## 2023-06-01 PROCEDURE — 99999 PR PBB SHADOW E&M-EST. PATIENT-LVL IV: ICD-10-PCS | Mod: PBBFAC,,, | Performed by: INTERNAL MEDICINE

## 2023-06-01 PROCEDURE — 3008F BODY MASS INDEX DOCD: CPT | Mod: CPTII,S$GLB,, | Performed by: INTERNAL MEDICINE

## 2023-06-01 PROCEDURE — 99999 PR PBB SHADOW E&M-EST. PATIENT-LVL IV: CPT | Mod: PBBFAC,,, | Performed by: INTERNAL MEDICINE

## 2023-06-01 PROCEDURE — 3075F PR MOST RECENT SYSTOLIC BLOOD PRESS GE 130-139MM HG: ICD-10-PCS | Mod: CPTII,S$GLB,, | Performed by: INTERNAL MEDICINE

## 2023-06-01 PROCEDURE — 1160F PR REVIEW ALL MEDS BY PRESCRIBER/CLIN PHARMACIST DOCUMENTED: ICD-10-PCS | Mod: CPTII,S$GLB,, | Performed by: INTERNAL MEDICINE

## 2023-06-01 PROCEDURE — 1111F DSCHRG MED/CURRENT MED MERGE: CPT | Mod: CPTII,S$GLB,, | Performed by: INTERNAL MEDICINE

## 2023-06-01 NOTE — Clinical Note
He is still having significant memory issues. He cannot remember something that happened 2 hours ago.  Is this expected?  When should he expect improvement?  Wife and patient are concerned about the ongoing symptoms with appointment about 2 weeks out.  Cosmo Torres

## 2023-06-01 NOTE — PROGRESS NOTES
Subjective:       Patient ID: Jayesh Rose is a 42 y.o. male.    Chief Complaint: Hospital f/u    HPI    42-year-old male here for hospital follow-up.  Discharged 05/24/2023.    Family and/or Caretaker present at visit?  Yes.  Diagnostic tests reviewed/disposition: I have reviewed all completed as well as pending diagnostic tests at the time of discharge.  Disease/illness education:  Encephalitis  Home health/community services discussion/referrals: Patient does not have home health established from hospital visit.  They do not need home health.  If needed, we will set up home health for the patient.   Establishment or re-establishment of referral orders for community resources: No other necessary community resources.   Discussion with other health care providers: No discussion with other health care providers necessary.  I reviewed and reconciled the medications from hospital discharge.    He feels like this is the first day he woke up out of the hospital.  His wife reports that he has no concept of what was a week ago or yesterday or time.  His short term memory is very impaired.  He cannot remember day to day or morning to afternoon.  He has autoimmune encephalopathy with seizures.  His wife reports that he has not had any motor seizures since he was hospitalized.    Discharge summary:  HPI:   Patient is a 43 yo male with a PMH of anxiety, GERD, and seizure disorder on Keppra who presents to [facility] with altered mental status per wife since waking up the morning of admission. She reports that he has had confusion, retrograde amnesia, and abnormal behavior since this morning. She left for work and received odd messages from him that did not make since so she returned home and found that his confusion and amnesia were worse and he was frothing from the mouth. She states that he was out drinking at a bar with a friend the night before, something he commonly dose, and came back around midnight and seemed in usual  "state of health. She asked the friend today and friend states there was nothing unusual about their outing. He has a history of tonic clonic seizures that he has been free from for the past several months although last year had to increase the dose of his keppra for breakthrough seizures. His seizures are typically preceded by a prodrome that the patient can feel coming on and followed by a short post ictal state that may involve some confusion, although wife states this current episode is nothing like seizures he has had in the past. He takes his seizure medication daily but often not twice a day as prescribed. Patient is alert with very poor short term memory. He does expresses recent headaches that he is unable to say are different or the same as headaches that he often gets and pain in his neck, back, and hips that is acute and chronic. He repeatedly asks the same questions such as "Why am I here?". He had an episode of vomiting earlier in the morning. He denies chest pain, shortness of breath, vision changes, or dizziness. He denies sick contacts, any recent travel, recent trauma, and they do not own pets.      He has no smoking history, he does admit to occasional binge drinking of around maybe 8 drinks every once in a while and admits to occasional marijuana use last used 2 weeks ago but no other illicit drug use. He lives with his wife and their young child who have had no abnormal symptoms.     ED course:Vitals stable with high BP and pulse. WBC 25.5. Lactate 6.0. Neurology consulted and LP performed for concern of possible meningitis. CT head unremarkable.         * No surgery found *       Hospital Course:   Patient with history of seizures on keppra presented with altered mental status and confusion with short term memory loss after potential seizure activity. Has other symptoms including nausea, diaphoresis, and fever. Admitted for concern of seizure activity with potential meningitis. LP performed but " was not suspicious for meningitis. Neurology consulted and MRI brain obtained. MRI brain showed evidence of diffuse restriction in bilateral temporal lobes suspicious for autoimmune encephalitis but can also be seen in postictal encephalitics. ID states low suspicion for infectious etiology. Antimicrobials discontinued and leukocytosis continued to resolve. IVIG started on 5/20 with plans for 5 day course. Repeat LP done, labs are pending. EEG from 5/20 showing one L temporal seizure and ictal rhythmic changes so pt loaded with 2g Keppra by neurology. No further seizures on EEG. Continued on keppra 1500 BID. To get MRI as outpatient and follow up w/ neurology on 6/15.    Review of Systems      Objective:      Physical Exam  Vitals reviewed.   Constitutional:       Appearance: He is well-developed.   HENT:      Head: Normocephalic and atraumatic.      Mouth/Throat:      Pharynx: No oropharyngeal exudate.   Eyes:      General: No scleral icterus.        Right eye: No discharge.         Left eye: No discharge.      Pupils: Pupils are equal, round, and reactive to light.   Neck:      Thyroid: No thyromegaly.      Trachea: No tracheal deviation.   Cardiovascular:      Rate and Rhythm: Normal rate and regular rhythm.      Heart sounds: Normal heart sounds. No murmur heard.    No friction rub. No gallop.   Pulmonary:      Effort: Pulmonary effort is normal. No respiratory distress.      Breath sounds: Normal breath sounds. No wheezing or rales.   Chest:      Chest wall: No tenderness.   Abdominal:      General: Bowel sounds are normal. There is no distension.      Palpations: Abdomen is soft. There is no mass.      Tenderness: There is no abdominal tenderness. There is no guarding or rebound.   Musculoskeletal:         General: No tenderness. Normal range of motion.      Cervical back: Normal range of motion and neck supple.   Skin:     General: Skin is warm and dry.      Coloration: Skin is not pale.      Findings: No  erythema or rash.   Neurological:      Mental Status: He is alert and oriented to person, place, and time.   Psychiatric:         Behavior: Behavior normal.       Assessment:       1. Hospital discharge follow-up    2. Seizure    3. Autoimmune encephalitis      Plan:       1/2/3.  Keppra 1500 mg twice daily per Neurology.  Message sent to Neurology that patient's symptoms while improved from hospitalization, have not returned to baseline and do not seem to be trending back to baseline.

## 2023-06-01 NOTE — TELEPHONE ENCOUNTER
----- Message from eT Garcia MD sent at 6/1/2023 10:28 AM CDT -----  Hi! Thanks for this. Yes, IVIG can take up to a few weeks before we see the most amount of benefit. Unless he has gotten worse, this could be natural evolution of the disease. His mesial temporal lobe was specifically affected hence his issues with recent memory. He should have a MRI scheduled soon before my appointment in 2 weeks. :) please remind him to get the MRI and I will see him in 2 weeks. If things get worse, please tell them to contact me. Thanks   ----- Message -----  From: Cosmo Torres MD  Sent: 6/1/2023  10:03 AM CDT  To: Te Garcia MD    He is still having significant memory issues. He cannot remember something that happened 2 hours ago.  Is this expected?  When should he expect improvement?  Wife and patient are concerned about the ongoing symptoms with appointment about 2 weeks out.    Cosmo Torres

## 2023-06-01 NOTE — TELEPHONE ENCOUNTER
Please let patient and wife know that neurology set this is normal progression of this type of disease.  Please remind him to get the MRI scheduled prior to the appointment in 2 weeks.

## 2023-06-05 ENCOUNTER — HOSPITAL ENCOUNTER (OUTPATIENT)
Dept: RADIOLOGY | Facility: OTHER | Age: 43
Discharge: HOME | End: 2023-06-05
Payer: COMMERCIAL

## 2023-06-05 ENCOUNTER — PATIENT MESSAGE (OUTPATIENT)
Dept: NEUROLOGY | Facility: CLINIC | Age: 43
End: 2023-06-05
Payer: COMMERCIAL

## 2023-06-05 DIAGNOSIS — G04.90 ENCEPHALITIS: ICD-10-CM

## 2023-06-05 PROCEDURE — 70553 MRI BRAIN W WO CONTRAST: ICD-10-PCS | Mod: 26,,, | Performed by: RADIOLOGY

## 2023-06-05 PROCEDURE — A9585 GADOBUTROL INJECTION: HCPCS

## 2023-06-05 PROCEDURE — 25500020 PHARM REV CODE 255

## 2023-06-05 PROCEDURE — 70553 MRI BRAIN STEM W/O & W/DYE: CPT | Mod: TC

## 2023-06-05 PROCEDURE — 70553 MRI BRAIN STEM W/O & W/DYE: CPT | Mod: 26,,, | Performed by: RADIOLOGY

## 2023-06-05 RX ORDER — GADOBUTROL 604.72 MG/ML
8.5 INJECTION INTRAVENOUS
Status: COMPLETED | OUTPATIENT
Start: 2023-06-05 | End: 2023-06-05

## 2023-06-05 RX ADMIN — GADOBUTROL 8.5 ML: 604.72 INJECTION INTRAVENOUS at 10:06

## 2023-06-06 LAB
AMPA-R AB CBA, CSF: NEGATIVE
AMPHIPHYSIN AB TITR CSF: NEGATIVE {TITER}
ANNOTATION COMMENT IMP: NORMAL
CASPR2-IGG CBA, CSF: NEGATIVE
CV2 IGG TITR CSF: NEGATIVE {TITER}
DPPX IGG CSF QL IF: NEGATIVE
GABA-B-R AB, CBA, CSF: NEGATIVE
GAD65 AB CSF-SCNC: 0 NMOL/L
GAD65 AB CSF-SCNC: 0 NMOL/L
GFAP IFA, CSF: NEGATIVE
GLIAL NUC TYPE 1 AB TITR CSF: NEGATIVE {TITER}
HU1 AB TITR CSF IF: NEGATIVE {TITER}
HU2 AB TITR CSF IF: NEGATIVE {TITER}
HU3 AB TITR CSF: NEGATIVE {TITER}
IGLON5 IFA, CSF: NEGATIVE
IMMUNOLOGIST REVIEW: NORMAL
LGI1-IGG CBA,CSF: NEGATIVE
M NEUROCHONDRIN IFA, CSF: NEGATIVE
M SEPTIN-7 IFA, CSF: NEGATIVE
MGLUR1 AB IFA, CSF: NEGATIVE
NIF IFA, CSF: NEGATIVE
NMDAR1 AB, CBA, CSF: NEGATIVE
PCA-2 AB TITR CSF: NEGATIVE {TITER}
PCA-TR AB TITR CSF: NEGATIVE {TITER}
PURKINJE CELLS AB TITR CSF IF: NEGATIVE {TITER}

## 2023-06-12 ENCOUNTER — TELEPHONE (OUTPATIENT)
Dept: NEUROLOGY | Facility: CLINIC | Age: 43
End: 2023-06-12
Payer: COMMERCIAL

## 2023-06-12 NOTE — TELEPHONE ENCOUNTER
----- Message from Toño Muniz sent at 6/12/2023  8:41 AM CDT -----  Regarding: Infusion Prescription  Contact: Lakesha Momin   Pt's wife Lakesha is calling in ref to prescription for high dose of steroids or steroid smoothie. Lakesha says Pharmacy says they don't do it it would be out patient infusion. Lakesha just wants clarification on where should she take the pt or where can she get medication. Patient Requesting Call Back @ Lakesha Momin

## 2023-06-13 RX ORDER — PREDNISONE 50 MG/1
1250 TABLET ORAL DAILY
Qty: 75 TABLET | Refills: 0
Start: 2023-06-13 | End: 2023-06-16

## 2023-06-13 RX ORDER — PREDNISONE 50 MG/1
1250 TABLET ORAL DAILY
Qty: 75 TABLET | Refills: 0 | Status: SHIPPED | OUTPATIENT
Start: 2023-06-13 | End: 2023-06-13

## 2023-06-14 ENCOUNTER — OFFICE VISIT (OUTPATIENT)
Dept: NEUROLOGY | Facility: CLINIC | Age: 43
End: 2023-06-14
Payer: COMMERCIAL

## 2023-06-14 ENCOUNTER — PATIENT MESSAGE (OUTPATIENT)
Dept: INTERNAL MEDICINE | Facility: CLINIC | Age: 43
End: 2023-06-14
Payer: COMMERCIAL

## 2023-06-14 VITALS
BODY MASS INDEX: 26.41 KG/M2 | SYSTOLIC BLOOD PRESSURE: 145 MMHG | HEART RATE: 95 BPM | DIASTOLIC BLOOD PRESSURE: 95 MMHG | WEIGHT: 184.5 LBS | HEIGHT: 70 IN

## 2023-06-14 DIAGNOSIS — G04.81 AUTOIMMUNE ENCEPHALITIS: Primary | ICD-10-CM

## 2023-06-14 PROCEDURE — 99214 OFFICE O/P EST MOD 30 MIN: CPT | Mod: S$GLB,,, | Performed by: STUDENT IN AN ORGANIZED HEALTH CARE EDUCATION/TRAINING PROGRAM

## 2023-06-14 PROCEDURE — 99214 PR OFFICE/OUTPT VISIT, EST, LEVL IV, 30-39 MIN: ICD-10-PCS | Mod: S$GLB,,, | Performed by: STUDENT IN AN ORGANIZED HEALTH CARE EDUCATION/TRAINING PROGRAM

## 2023-06-14 PROCEDURE — 99999 PR PBB SHADOW E&M-EST. PATIENT-LVL III: CPT | Mod: PBBFAC,,, | Performed by: STUDENT IN AN ORGANIZED HEALTH CARE EDUCATION/TRAINING PROGRAM

## 2023-06-14 PROCEDURE — 99999 PR PBB SHADOW E&M-EST. PATIENT-LVL III: ICD-10-PCS | Mod: PBBFAC,,, | Performed by: STUDENT IN AN ORGANIZED HEALTH CARE EDUCATION/TRAINING PROGRAM

## 2023-06-14 NOTE — PROGRESS NOTES
[facility] - NEUROLOGY [address]TH [address] [facility], [place]    Date: 6/14/23  Patient Name: Jayesh Rose   MRN: [id number]   PCP: Du Shaw  Referring Provider: No ref. provider found    Assessment:   Jayesh Rose is a 42 y.o. male presenting for hospital follow up for encephalitis. Mild to moderate improvement in his symptoms (mostly anterograde memory) since dc after completing 5 days of IVIG and 2 days of high dose steroids. Given history, imaging findings and response to IVIG and steroids, autoimmune ethology is highly possible. Antibodies so far have been negative, which is not uncommon. He is overall doing better. After a thorough discussion, we decided to complete the 3 doses of steroids and give it more time before reassessing. Is after a few weeks there is no improvement, we will repeat an MRI and consider another cycle of IVIG if needed. We will continue keppra 1500 bid and will plan for neuropsychology testing 1-2 months from now to evaluate for new baseline after deficits.     Plan:     - Continue and complete 3 days of high dose steroids (prednisone 50 mg, 25 pills for 3 days, preferably in AM to avoid delirium/insomnia)  - Phone follow up in 3-4 weeks. Depending on improvement +/- repeat MRI +/- repeat IVIG cycle.   - Continue keppra 1500 bid   - Neuropsychology testing. Planned for 1 month from now. Ideal to get a new baseline of his deficits.  - Lab work up to rule out other potential ethologies   - Follow up open, depending on above.     Problem List Items Addressed This Visit          ID    Autoimmune encephalitis - Primary    Relevant Orders    VITAMIN B1    VITAMIN B12    Neuropsychological testing    PATI Garcia MD    Patient seen in consultation at the request of No ref. provider found for the evaluation of hospital follow up for encephalitis. A copy of this note will be sent to the referring physician.      HPI:   Mr. Jayesh Solorio  Rose is a 42 y.o. male presenting hospital follow up for encephalitis. He has a history of seizures and anxiety. 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 He is here for hospital follow up of presumptive autoimmune encephalitis. He presented to [facility] around tucker ago after a GTC. In the ER him and his wife stated that he was also having some confusion and myalgias. WBC was elevated and there was some questionable neck pain. CT head unremarkable. LP done due to concerns for meningitis. CSF studies non specific so neurology was consulted. MRI brain w wo showed diffusion restriction and T2 flair hyper intensities on bilateral temporal lobes. Repeat LP ruled out infection and he was started on IVIG. He completed 5 days with mild subjective improvement while inpatient and was dc on day 5. Since then, there has been mild-mod improvement. Wife reached out to me last week and after discussing with Dr Berumen, we prescribed 3 days of high dose steroids. He is on day 2. Mild improvement noted     PAST MEDICAL HISTORY:  Past Medical History:   Diagnosis Date    Anxiety 5/18/2023    Class 1 obesity in adult 5/18/2023    GERD (gastroesophageal reflux disease) 1/10/2019    Seizures        PAST SURGICAL HISTORY:  No past surgical history on file.    CURRENT MEDS:  Current Outpatient Medications   Medication Sig Dispense Refill    guaifenesin (MUCINEX ORAL) Take 1 tablet by mouth 2 (two) times daily as needed (allergies).      hydrOXYzine HCL (ATARAX) 25 MG tablet Take 1 tablet (25 mg total) by mouth 3 (three) times daily as needed for Anxiety. 90 tablet 1    ibuprofen (ADVIL,MOTRIN) 200 MG tablet Take 400-800 mg by mouth 2 (two) times daily as needed for Pain.      levETIRAcetam (KEPPRA) 750 MG Tab Take 2 tablets (1,500 mg total) by mouth every 12 (twelve) hours. 120 tablet 2    multivitamin (THERAGRAN)  "per tablet Take 1 tablet by mouth once daily.      omeprazole (PRILOSEC OTC) 20 MG tablet Take 20 mg by mouth daily as needed (heartburn).      predniSONE (DELTASONE) 50 MG Tab Take 25 tablets (1,250 mg total) by mouth once daily. 1250mg (25 tablets) daily for 3 days   Okay to split up the dosage between morning and noon, don't take at dinner since they may make it harder for you to sleep for 3 days 75 tablet 0    sertraline (ZOLOFT) 100 MG tablet Take 100 mg by mouth once daily.       No current facility-administered medications for this visit.       ALLERGIES:  Review of patient's allergies indicates:  No Known Allergies    FAMILY HISTORY:  Family History   Problem Relation Age of Onset    Mental illness Mother     Heart disease Father     Mental illness Father     Heart disease Brother     Cancer Maternal Grandfather     Cancer Paternal Grandfather        SOCIAL HISTORY:  Social History     Tobacco Use    Smoking status: Never     Passive exposure: Never    Smokeless tobacco: Never   Substance Use Topics    Drug use: No       Review of Systems:  12 system review of systems is negative except for the symptoms mentioned in HPI.      Objective:     Vitals:    06/14/23 1311   BP: (!) 145/95   Pulse: 95   Weight: 83.7 kg (184 lb 8.4 oz)   Height: 5' 10" (1.778 m)     General: NAD, well nourished   Eyes: no tearing, discharge, no erythema   ENT: moist mucous membranes of the oral cavity, nares patent    Neck: Supple, full range of motion  Cardiovascular: Warm and well perfused, pulses equal and symmetrical  Lungs: Normal work of breathing, normal chest wall excursions  Skin: No rash, lesions, or breakdown on exposed skin  Psychiatry: Mood and affect are appropriate   Abdomen: soft, non tender, non distended  Extremeties: No cyanosis, clubbing or edema.    Neurological   MENTAL STATUS: Alert and oriented to person, place, and time. Attention and concentration within normal limits. Speech without dysarthria, able to name " and repeat without difficulty. Remote memory within normal limits. Recent memory impaired.   CRANIAL NERVES: Visual fields intact. PERRL. EOMI. Facial sensation intact. Face symmetrical. Hearing grossly intact. Full shoulder shrug bilaterally. Tongue protrudes midline   SENSORY: Sensation is intact to pin throughout.  Joint position perception intact. Negative Romberg.   MOTOR: Normal bulk and tone. No pronator drift.  5/5 deltoid, biceps, triceps, interosseous, hand  bilaterally. 5/5 iliopsoas, knee extension/flexion, foot dorsi/plantarflexion bilaterally.    REFLEXES: Symmetric and 3+ throughout. Toes down going bilaterally. Bilateral campbell   CEREBELLAR/COORDINATION/GAIT: Gait steady with normal arm swing and stride length.  Heel to shin intact. Finger to nose intact. Normal rapid alternating movements.

## 2023-06-15 ENCOUNTER — PATIENT MESSAGE (OUTPATIENT)
Dept: NEUROLOGY | Facility: CLINIC | Age: 43
End: 2023-06-15

## 2023-06-20 LAB — FUNGUS SPEC CULT: NORMAL

## 2023-06-26 LAB — FUNGUS SPEC CULT: NORMAL

## 2023-06-30 ENCOUNTER — PATIENT MESSAGE (OUTPATIENT)
Dept: NEUROLOGY | Facility: CLINIC | Age: 43
End: 2023-06-30
Payer: COMMERCIAL

## 2023-07-07 DIAGNOSIS — G04.81 AUTOIMMUNE ENCEPHALITIS: Primary | ICD-10-CM

## 2023-07-09 LAB
ACID FAST MOD KINY STN SPEC: NORMAL
MYCOBACTERIUM SPEC QL CULT: NORMAL

## 2023-07-11 ENCOUNTER — OFFICE VISIT (OUTPATIENT)
Dept: NEUROLOGY | Facility: CLINIC | Age: 43
End: 2023-07-11
Payer: COMMERCIAL

## 2023-07-11 DIAGNOSIS — G31.84 MILD NEUROCOGNITIVE DISORDER: Primary | ICD-10-CM

## 2023-07-11 DIAGNOSIS — F43.23 ADJUSTMENT DISORDER WITH MIXED ANXIETY AND DEPRESSED MOOD: ICD-10-CM

## 2023-07-11 PROCEDURE — 90791 PR PSYCHIATRIC DIAGNOSTIC EVALUATION: ICD-10-PCS | Mod: 95,,, | Performed by: CLINICAL NEUROPSYCHOLOGIST

## 2023-07-11 PROCEDURE — 90791 PSYCH DIAGNOSTIC EVALUATION: CPT | Mod: 95,,, | Performed by: CLINICAL NEUROPSYCHOLOGIST

## 2023-07-11 PROCEDURE — 99499 NO LOS: ICD-10-PCS | Mod: 95,,, | Performed by: CLINICAL NEUROPSYCHOLOGIST

## 2023-07-11 PROCEDURE — 99499 UNLISTED E&M SERVICE: CPT | Mod: 95,,, | Performed by: CLINICAL NEUROPSYCHOLOGIST

## 2023-07-11 NOTE — PROGRESS NOTES
NEUROPSYCHOLOGY CONSULT (TELEHEALTH)    Referral Information  Name: Jayesh Rose  MRN: [id number]  : 1980  Age: 42 y.o.  Gender: male  Referring Provider: Te Garcia Md  [address] [place] [address]  Telemedicine:   The patient location is: Home  The provider location is: [facility]  The chief complaint leading to consultation/medical necessity is: Cognitive concerns  Visit type: Virtual visit with synchronous audio and video   Total time spent with patient: 70-min  Each patient to whom he or she provides medical services by telemedicine is:  (1) informed of the relationship between the physician and patient and the respective role of any other health care provider with respect to management of the patient; and (2) notified that he or she may decline to receive medical services by telemedicine and may withdraw from such care at any time.  Consent/Emergency Plan: The patient expressed an understanding of the purpose of the evaluation and consented to all procedures. I    SUMMARY/TREATMENT PLAN   Results from the interview indicate the following diagnoses and treatment plan recommendations. This was discussed with the patient and he can follow a treatment plan.    Diagnoses/Plan:  Problem List Items Addressed This Visit          Neuro    Mild neurocognitive disorder - Primary    Current Assessment & Plan     Assessment:  -abrupt decline May 18, 2023 with admission noting altered mental status ultimately diagnosed as autoimmune encephalitis  -some acute improvement with confusion but residual significant short-term memory trouble 2 months later    Plan:  -neuropsych testing to characterize cognitive status, differential diagnosis, and provide treatment plan to optimize cognition during greatest potential recovery these next several months              Psychiatric    Adjustment disorder with mixed anxiety and depressed mood    Current Assessment & Plan     Assessment:  -longstanding  "intermittent depressive episodes but recent hospitalization and diagnosis of autoimmune encephalitis is resulted in considerable stress and understandably anxiety along with dysphoria around ongoing recovery    Plan:  -symptoms and severity will be assessed in more detail during neuropsych consult along with treatment plan once we know his cognitive status and ability to benefit from treatment beyond medication              HISTORY OF PRESENT ILLNESS AND CURRENT SYMPTOMS     Mr. Rose has active problems noted below.       Cognitive Symptoms:  Onset: Abrupt (review inpatient notes) and admitted to [facility] 05/18/23.  Ultimately, diagnosed with autoimmune encephalitis (review imaging and EEG below).  Course:  Acute confusion improved by the end of his hospitalization but residual and significant short-term memory trouble remained at discharge  Over the past 30 days, he notes very modest improvement, if any" for short-term memory.    He notes ongoing and significant short-term memory trouble such that he must use various strategies (writing things down, LEs, recording, alarms as prompts    Day to day: Mornings are hardest and wakes up consistently feeling disoriented (now expects it) and is less distressed by it. After about an hour, he feels more oriented and sharper with using a lot of structure. "I have to be a lot more deliberate now."        Current Functional Status/Needs:  ADLs  Self-Care Eating Safety   Bathing: Independent  Bathroom: Independent  Other: Independent Independent     Instrumental IADLs:   Driving Medications/Health Household Finances   Independent and needs to be more deliberate about reorienting himself Wife helps him remember daily still  Independent Shared responsibilities      Psychiatric/Behavioral Symptoms:  Mood:  Depression/Dysphoria Anxiety/Fearfulness Irritability   More dysphoria overall 2/2 hospitalization and worsens 2/2 cognitive issues day to day. Has had 1-2 noticeable episodes " that he needed some time to himself in the past month but resolved within an hour or so.     Longstanding episodes of depression that occurred intermittently throughout his life More anxious in general and particularly about his capabilities and cognitive functions      Lowered frustration tolerance     Behavior:  Agitation/Resistance Delusions/Paranoia Hallucinations   None None None     Apathy/Motivation Repetitive/Restlessness Other   None None None     Neurovegetative:  Sleep/Nighttime  Appetite Energy   Trouble falling asleep (anxiety-related and checking his phone / schedule to not     Once he falls asleep, then he stays asleep.  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[employer] [no major issues at work]    Family History   Problem Relation Age of Onset    Mental illness Mother     Heart disease Father     Mental illness Father     Heart disease Brother     Cancer Maternal Grandfather     Cancer Paternal Grandfather      Family Neurologic History: Negative for heritable risk factors  Family Psychiatric History: Depression    MEDICAL STATUS  Patient Active Problem List   Diagnosis    Overweight (BMI 25.0-29.9)    GERD (gastroesophageal reflux disease)    DDD (degenerative disc disease), lumbar    Seizure    Encephalopathy, metabolic    Adjustment disorder with mixed anxiety and depressed mood    Autoimmune encephalitis    Mild neurocognitive disorder     Past Medical History:   Diagnosis Date     Anxiety 5/18/2023    Class 1 obesity in adult 5/18/2023    GERD (gastroesophageal reflux disease) 1/10/2019    Seizures      No past surgical history on file.    Updated/Relevant Neurologic History:  Falls: None  TBI: None  Seizures: Yes, during 05/18/2023 admission for autoimmune encephalitis  Stroke: None  Movement Concerns: None      Recent Labs  No results found for: EBDLYVSS17  No results found for: RPR  No results found for: FOLATE  Lab Results   Component Value Date    TSH 0.50 01/16/2019     Lab Results   Component Value Date    HGBA1C 5.0 01/16/2019     Lab Results   Component Value Date    [id number] Non-reactive 05/18/2023       Neurodiagnostics  Year Diagnostic Results[Copied from Report]   2023-07-14   MRI There is been continued decrease in the signal changes within the medial temporal lobes and hippocampus bilaterally with resolution of diffusion restriction.  Findings are consistent with improvement.  No new abnormalities identified.        Electronically signed by: Miles Momin MD  Date:                                            07/14/2023 2023-05-20 EEG EEG from 5/20 showing one L temporal seizure and ictal rhythmic changes so pt loaded with 2g Keppra by neurology. No further seizures on EEG   2023-05-18 MRI Impression:     Symmetric FLAIR a diffusion restriction involving the bilateral mesial temporal lobes.  No associated abnormal enhancement.  While findings can be seen in the reported postictal setting, distribution is suggestive of autoimmune encephalitis.  Infectious encephalitis felt less likely.  Consider further evaluation with CSF markers.     This report was flagged in Epic as abnormal         Current Outpatient Medications:     guaifenesin (MUCINEX ORAL), Take 1 tablet by mouth 2 (two) times daily as needed (allergies)., Disp: , Rfl:     hydrOXYzine HCL (ATARAX) 25 MG tablet, Take 1 tablet (25 mg total) by mouth 3 (three) times daily as needed for Anxiety., Disp: 90 tablet, Rfl: 1    " ibuprofen (ADVIL,MOTRIN) 200 MG tablet, Take 400-800 mg by mouth 2 (two) times daily as needed for Pain., Disp: , Rfl:     levETIRAcetam (KEPPRA) 750 MG Tab, Take 2 tablets (1,500 mg total) by mouth every 12 (twelve) hours., Disp: 120 tablet, Rfl: 2    multivitamin (THERAGRAN) per tablet, Take 1 tablet by mouth once daily., Disp: , Rfl:     omeprazole (PRILOSEC OTC) 20 MG tablet, Take 20 mg by mouth daily as needed (heartburn)., Disp: , Rfl:     sertraline (ZOLOFT) 100 MG tablet, Take 100 mg by mouth once daily., Disp: , Rfl:     Updated/Relevant Psychiatric History:    Sxs: Depression lifelong   Tx: In 2020, started tx for depression (meds, therapy). Uncertain if meds helped but therapy helped him be aware of triggers and feelings before sxs got "bad."     Substance Use:  Current: Social drinking (maybe 1-2x since hospitalization)  Previous: Social drinking and maybe some binging episodes ("normal for [place]"); occasional THC    MENTAL STATUS AND OBSERVATIONS:  APPEARANCE: Casually dressed and adequate grooming/hygiene.   ALERTNESS/ORIENTATION: Attentive and alert. Fully oriented (x5) to time and place  GAIT: Not assessed  MOTOR MOVEMENTS/MANNERISMS: Not assessed  SPEECH/LANGUAGE: Normal in rate, rhythm, tone, and volume. No significant word finding difficulty noted. Expressive and receptive language was normal.  STATED MOOD/AFFECT: The patients stated mood was "depressed." Affect was congruent with stated mood.   INTERPERSONAL BEHAVIOR: Rapport was quickly and easily established   SUICIDALITY/HOMICIDALITY: None reported  HALLUCINATIONS/DELUSIONS: None evidenced or endorsed  THOUGHT PROCESSES/INSIGHT: Thoughts seemed logical and goal-directed.     BILLING  Service Description CPT Code Minutes Units   Psychiatric diagnostic evaluation by physician [phone]   Neurobehavioral status exam by physician [id number]  0   Each additional hour by physician [id number]  0                  "

## 2023-07-14 ENCOUNTER — HOSPITAL ENCOUNTER (OUTPATIENT)
Dept: RADIOLOGY | Facility: HOSPITAL | Age: 43
Discharge: HOME | End: 2023-07-14
Attending: STUDENT IN AN ORGANIZED HEALTH CARE EDUCATION/TRAINING PROGRAM
Payer: COMMERCIAL

## 2023-07-14 DIAGNOSIS — G04.81 AUTOIMMUNE ENCEPHALITIS: ICD-10-CM

## 2023-07-14 PROCEDURE — 70553 MRI BRAIN W WO CONTRAST: ICD-10-PCS | Mod: 26,,, | Performed by: RADIOLOGY

## 2023-07-14 PROCEDURE — 70553 MRI BRAIN STEM W/O & W/DYE: CPT | Mod: TC

## 2023-07-14 PROCEDURE — A9585 GADOBUTROL INJECTION: HCPCS | Performed by: STUDENT IN AN ORGANIZED HEALTH CARE EDUCATION/TRAINING PROGRAM

## 2023-07-14 PROCEDURE — 70553 MRI BRAIN STEM W/O & W/DYE: CPT | Mod: 26,,, | Performed by: RADIOLOGY

## 2023-07-14 PROCEDURE — 25500020 PHARM REV CODE 255: Performed by: STUDENT IN AN ORGANIZED HEALTH CARE EDUCATION/TRAINING PROGRAM

## 2023-07-14 RX ORDER — GADOBUTROL 604.72 MG/ML
10 INJECTION INTRAVENOUS
Status: COMPLETED | OUTPATIENT
Start: 2023-07-14 | End: 2023-07-14

## 2023-07-14 RX ADMIN — GADOBUTROL 10 ML: 604.72 INJECTION INTRAVENOUS at 04:07

## 2023-07-18 PROBLEM — G31.84 MILD NEUROCOGNITIVE DISORDER: Status: ACTIVE | Noted: 2023-07-18

## 2023-07-18 PROBLEM — F43.23 ADJUSTMENT DISORDER WITH MIXED ANXIETY AND DEPRESSED MOOD: Status: ACTIVE | Noted: 2023-05-18

## 2023-07-18 NOTE — ASSESSMENT & PLAN NOTE
Assessment:  -abrupt decline May 18, 2023 with admission noting altered mental status ultimately diagnosed as autoimmune encephalitis  -some acute improvement with confusion but residual significant short-term memory trouble 2 months later    Plan:  -neuropsych testing to characterize cognitive status, differential diagnosis, and provide treatment plan to optimize cognition during greatest potential recovery these next several months

## 2023-07-18 NOTE — ASSESSMENT & PLAN NOTE
Assessment:  -longstanding intermittent depressive episodes but recent hospitalization and diagnosis of autoimmune encephalitis is resulted in considerable stress and understandably anxiety along with dysphoria around ongoing recovery    Plan:  -symptoms and severity will be assessed in more detail during neuropsych consult along with treatment plan once we know his cognitive status and ability to benefit from treatment beyond medication

## 2023-07-20 ENCOUNTER — OFFICE VISIT (OUTPATIENT)
Dept: NEUROLOGY | Facility: CLINIC | Age: 43
End: 2023-07-20
Payer: COMMERCIAL

## 2023-07-20 DIAGNOSIS — G04.81 AUTOIMMUNE ENCEPHALITIS: ICD-10-CM

## 2023-07-20 DIAGNOSIS — F06.70 MILD NEUROCOGNITIVE DISORDER DUE TO ANOTHER MEDICAL CONDITION: Primary | ICD-10-CM

## 2023-07-20 DIAGNOSIS — F43.23 ADJUSTMENT DISORDER WITH MIXED ANXIETY AND DEPRESSED MOOD: ICD-10-CM

## 2023-07-20 DIAGNOSIS — G93.41 ENCEPHALOPATHY, METABOLIC: ICD-10-CM

## 2023-07-20 PROCEDURE — 99499 NO LOS: ICD-10-PCS | Mod: S$GLB,,, | Performed by: CLINICAL NEUROPSYCHOLOGIST

## 2023-07-20 PROCEDURE — 96139 PSYCL/NRPSYC TST TECH EA: CPT | Mod: S$GLB,,, | Performed by: CLINICAL NEUROPSYCHOLOGIST

## 2023-07-20 PROCEDURE — 96139 PR PSYCH/NEUROPSYCH TEST ADMIN/SCORING, BY TECH, 2+ TESTS, EA ADDTL 30 MIN: ICD-10-PCS | Mod: S$GLB,,, | Performed by: CLINICAL NEUROPSYCHOLOGIST

## 2023-07-20 PROCEDURE — 96138 PR PSYCH/NEUROPSYCH TEST ADMIN/SCORING, BY TECH, 2+ TESTS, 1ST 30 MIN: ICD-10-PCS | Mod: S$GLB,,, | Performed by: CLINICAL NEUROPSYCHOLOGIST

## 2023-07-20 PROCEDURE — 96132 NRPSYC TST EVAL PHYS/QHP 1ST: CPT | Mod: S$GLB,,, | Performed by: CLINICAL NEUROPSYCHOLOGIST

## 2023-07-20 PROCEDURE — 99999 PR PBB SHADOW E&M-EST. PATIENT-LVL I: ICD-10-PCS | Mod: PBBFAC,,, | Performed by: CLINICAL NEUROPSYCHOLOGIST

## 2023-07-20 PROCEDURE — 96138 PSYCL/NRPSYC TECH 1ST: CPT | Mod: S$GLB,,, | Performed by: CLINICAL NEUROPSYCHOLOGIST

## 2023-07-20 PROCEDURE — 99499 UNLISTED E&M SERVICE: CPT | Mod: S$GLB,,, | Performed by: CLINICAL NEUROPSYCHOLOGIST

## 2023-07-20 PROCEDURE — 96133 NRPSYC TST EVAL PHYS/QHP EA: CPT | Mod: S$GLB,,, | Performed by: CLINICAL NEUROPSYCHOLOGIST

## 2023-07-20 PROCEDURE — 96132 PR NEUROPSYCHOLOGIC TEST EVAL SVCS, 1ST HR: ICD-10-PCS | Mod: S$GLB,,, | Performed by: CLINICAL NEUROPSYCHOLOGIST

## 2023-07-20 PROCEDURE — 96133 PR NEUROPSYCHOLOGIC TEST EVAL SVCS, EA ADDTL HR: ICD-10-PCS | Mod: S$GLB,,, | Performed by: CLINICAL NEUROPSYCHOLOGIST

## 2023-07-20 PROCEDURE — 99999 PR PBB SHADOW E&M-EST. PATIENT-LVL I: CPT | Mod: PBBFAC,,, | Performed by: CLINICAL NEUROPSYCHOLOGIST

## 2023-07-20 NOTE — LETTER
July 28, 2023        Te Garcia MD  [address]  [facility] [address]             [facility]  [address]  [facility] [address]  Phone: [phone]  Fax: [phone]   Patient: Jayesh Rose   MR Number: [id number]   YOB: 1980   Date of Visit: 7/20/2023       Dear Dr. Garcia:    Thank you for referring Jayesh Rose to me for evaluation. Below are the relevant portions of my assessment and plan of care.            If you have questions, please do not hesitate to call me. I look forward to following Jayesh along with you.    Sincerely,      INGRID Wilkins II, PhD           CC  MD Du Mazariegos MD

## 2023-07-27 NOTE — PROGRESS NOTES
Outpatient Neuropsychological Evaluation    Referral Information  Name: Jayesh Rose  MRN: [id number]  : 1980  Age: 42 y.o.  Gender: male  Referring Provider: Te Garcia Md  [address]  [place]  [place] [address]  The chief complaint leading to consultation/medical necessity is: Cognitive concerns  Visit type: Testing, report, treatment plan with initial visit on 2023    SUMMARY/TREATMENT PLAN   Results indicate the following diagnoses and treatment plan recommendations. 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 Also, findings noted increased trouble with visual-spatial skills (nonverbal reasoning, visual perception) and  increased variability with attention/working memory. Otherwise, scores were largely within expectations for verbal skills, most executive functions, mental speed, basic attention, and language measures.   -Etiology/Severity:   1. 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  2. Certainly ongoing anxiety and depression will make his cognitive functions more inefficient day-to-day.  3. Since he is only 2 months out, more recovery is certainly likely.  However, he needs an adjustment to his treatment plan to include cognitive rehabilitation over the next year.    Plan:  Neuropsychology Follow-up Feedback session to review results/plan of care   Reevaluation is recommended in 12-months following implementation  of recommendations to track cognitive status, refine diagnosis, update treatment plan.   Neurology Follow-up Continued Neurology follow-up    Mental Health Follow-up Psychology/Therapy: Consultation with a therapist and will discuss   Recommend [email or web address] for referrals/scheduling therapy.    Sleep Medicine Follow-up Consultation to assess current sleep quality, rule out a sleep-related disorder, and provide treatment recs.   Cognitive Rehab Placing referral        Recommendations for Mr. Rose and Caregivers/Family:   Brain Health: Engage in regular exercise, which increases alertness and arousal and can improve attention and focus.    Get a good nights sleep, as this can enhance alertness and cognition.  Eat healthy foods and balanced meals. It is notable that research indicates certain nutrients may aid in brain function, such as B vitamins (especially B6, B12, and folic acid), antioxidants (such as vitamins C and E, and beta carotene), and Omega-3 fatty acids. Talk with your physician or nutritionist about whats right for you.   Keep your brain active. Find activities to stay mentally active, such as reading, games (cards, checkers), puzzles (crosswords, Sudoku, jig saw), crafts (models, woodworking), gardening, or participating in activities in the community.  Stay socially engaged. Continue staying active with your family and friends.   Sleep Tips Poor sleep has a negative effect on cognition. Several strategies have been shown to improve sleep:   Caffeine intake in the afternoon and evening, as well as stuffing oneself at supper, can decrease the quality of restful sleep throughout the night.   Bedtime and wake-up times should be consistent every night and morning so the body becomes used to a single routine, even on the weekends.  Engage in daily physical activity, but not 2-3 hours before bedtime.   No technology use (television, computer, iPad) 1-2 hours before bed.   Have a wind down  routine (e.g., soft lights in the house, bath before bed, reduced fluid intake, songs, reading, less noise) to promote sleep readiness.   Visit the www.sleepfoundation.org for more strategies.   Attention Tips Remember that inattention and lack of focus are major culprits to forgetting information so be sure and practice paying attention for adequate learning of information. If you rely on passive attention to remembering something (e.g., yeah, uh-huh approach), youll find you cannot recall it later. I recommend the following to improve attention, which may aid in later recall:   Reduce distractions as much as possible.  Look at the person as they are speaking to you.   Paraphrase as they are speaking  Write down important pieces of information   Ask people to repeat if you zone out.    Have visual cues  (posted to-do-list, daily schedule) to remind you if you need to do something later.   Processing Speed Tips Using multiple modalities (e.g., listening, writing notes, asking questions, recording) to learn new information is likely to allow additional time for processing, thus improving memory for the material.   Allowing sufficient time to complete tasks will reduce frustration and help to ensure completion.  Spend a lot of up-front time planning in advance how long a task may take and then chunk steps in the task so you don't wear yourself out.   Executive Functioning Tips: Dont attempt to multi-task.  Separate tasks so that each can be completed one at a time.  Consider using a calendar/day planner, as that may be effective to help you plan and stay on track.  Color-coding specific tasks by importance may add additional benefit to your planner.  Break down large projects into smaller tasks and write down the steps to completing the task.    Memory Tips- Learning something (initially reading something, talking to someone) Rehearse - Immediately after seeing/hearing something, try to recall it.  Wait a few  "minutes, then check again.  Gradually lengthen the intervals between rehearsals.  Repetition of learned material is critical to ensure storage of information to be learned. Self-test at home to ensure learning.  Write down important information to improve your attention and focus and to have something to look back on when you need to recall it.  Make sure the person doesnt rattle off, but presents in a clear, logical, and unhurried manner.                 Relevant Orders    Ambulatory referral/consult to Speech Therapy       Psychiatric    Adjustment disorder with mixed anxiety and depressed mood       ID    Autoimmune encephalitis    Relevant Orders    Ambulatory referral/consult to Speech Therapy         HISTORY OF PRESENT ILLNESS AND CURRENT SYMPTOMS     Mr. Rose has active problems noted below.       Cognitive Symptoms:  Onset: Abrupt (review inpatient notes) and admitted to [facility] [place] [facility] 05/18/23.  Ultimately, diagnosed with autoimmune encephalitis (review imaging and EEG below).  Course:  Acute confusion improved by the end of his hospitalization but residual and significant short-term memory trouble remained at discharge  Over the past 30 days, he notes very modest improvement, if any" for short-term memory.    He notes ongoing and significant short-term memory trouble such that he must use various strategies (writing things down, LEs, recording, alarms as prompts    Day to day: Mornings are hardest and wakes up consistently feeling disoriented (now expects it) and is less distressed by it. After about an hour, he feels more oriented and sharper with using a lot of structure. "I have to be a lot more deliberate now."        Current Functional Status/Needs:  ADLs  Self-Care Eating Safety   Bathing: Independent  Bathroom: Independent  Other: Independent Independent     Instrumental IADLs:   Driving Medications/Health Household Finances   Independent and needs to be more deliberate about reorienting himself Wife " helps him remember daily still  Independent Shared responsibilities      Psychiatric/Behavioral Symptoms:  Mood:  Depression/Dysphoria Anxiety/Fearfulness Irritability   More dysphoria overall 2/2 hospitalization and worsens 2/2 cognitive issues day to day. Has had 1-2 noticeable episodes that he needed some time to himself in the past month but resolved within an hour or so.     Longstanding episodes of depression that occurred intermittently throughout his life More anxious in general and particularly about his capabilities and cognitive functions      Lowered frustration tolerance     Behavior:  Agitation/Resistance Delusions/Paranoia Hallucinations   None None None     Apathy/Motivation Repetitive/Restlessness Other   None None None     Neurovegetative:  Sleep/Nighttime  [employer]   Trouble falling asleep (anxiety-related and checking his phone / schedule to not     Once he falls asleep, then he stays asleep.  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[employer] [no major issues at work]    Family History   Problem Relation Age of Onset    Mental illness Mother     Heart disease Father     Mental illness Father     Heart disease Brother     Cancer Maternal Grandfather     Cancer Paternal Grandfather      Family Neurologic History: Negative for heritable risk factors  Family Psychiatric History: Depression    MEDICAL STATUS  Patient Active Problem List   Diagnosis    Overweight (BMI 25.0-29.9)     GERD (gastroesophageal reflux disease)    DDD (degenerative disc disease), lumbar    Seizure    Encephalopathy, metabolic    Adjustment disorder with mixed anxiety and depressed mood    Autoimmune encephalitis    Mild neurocognitive disorder due to another medical condition     Past Medical History:   Diagnosis Date    Anxiety 5/18/2023    Class 1 obesity in adult 5/18/2023    GERD (gastroesophageal reflux disease) 1/10/2019    Seizures      No past surgical history on file.    Updated/Relevant Neurologic History:  Falls: None  TBI: None  Seizures: Yes, during 05/18/2023 admission for autoimmune encephalitis  Stroke: None  Movement Concerns: None  Autoimmune Encephalitis: see admission and discharge notes (05/18/23)      Recent Labs  No results found for: [id number]  No results found for: RPR  No results found for: FOLATE  Lab Results   Component Value Date    TSH 0.50 01/16/2019     Lab Results   Component Value Date    HGBA1C 5.0 01/16/2019     Lab Results   Component Value Date    [id number] Non-reactive 05/18/2023       Neurodiagnostics  Year Diagnostic Results[Copied from Report]   2023-07-14   MRI There is been continued decrease in the signal changes within the medial temporal lobes and hippocampus bilaterally with resolution of diffusion restriction.  Findings are consistent with improvement.  No new abnormalities identified.        Electronically signed by: Miles Momin MD  Date:                                            07/14/2023 2023-05-20 EEG EEG from 5/20 showing one L temporal seizure and ictal rhythmic changes so pt loaded with 2g Keppra by neurology. No further seizures on EEG   2023-05-18 MRI Impression:     Symmetric FLAIR a diffusion restriction involving the bilateral mesial temporal lobes.  No associated abnormal enhancement.  While findings can be seen in the reported postictal setting, distribution is suggestive of autoimmune encephalitis.  Infectious encephalitis felt less likely.   "Consider further evaluation with CSF markers.     This report was flagged in Epic as abnormal         Current Outpatient Medications:     guaifenesin (MUCINEX ORAL), Take 1 tablet by mouth 2 (two) times daily as needed (allergies)., Disp: , Rfl:     ibuprofen (ADVIL,MOTRIN) 200 MG tablet, Take 400-800 mg by mouth 2 (two) times daily as needed for Pain., Disp: , Rfl:     levETIRAcetam (KEPPRA) 750 MG Tab, Take 2 tablets (1,500 mg total) by mouth every 12 (twelve) hours., Disp: 120 tablet, Rfl: 2    multivitamin (THERAGRAN) per tablet, Take 1 tablet by mouth once daily., Disp: , Rfl:     omeprazole (PRILOSEC OTC) 20 MG tablet, Take 20 mg by mouth daily as needed (heartburn)., Disp: , Rfl:     sertraline (ZOLOFT) 100 MG tablet, Take 100 mg by mouth once daily., Disp: , Rfl:     Updated/Relevant Psychiatric History:    Sxs: Depression lifelong   Tx: In 2020, started tx for depression (meds, therapy). Uncertain if meds helped but therapy helped him be aware of triggers and feelings before sxs got "bad."     Substance Use:  Current: Social drinking (maybe 1-2x since hospitalization)  Previous: Social drinking and maybe some binging episodes ("normal for [place]"); occasional THC    MENTAL STATUS AND OBSERVATIONS:  APPEARANCE: Casually dressed and adequate grooming/hygiene.   ALERTNESS/ORIENTATION: Attentive and alert. Fully oriented (x5) to time and place  GAIT: unremarkable  MOTOR MOVEMENTS/MANNERISMS: unremarkable  SPEECH/LANGUAGE: Normal in rate, rhythm, tone, and volume. No significant word finding difficulty noted. Expressive and receptive language was normal.  STATED MOOD/AFFECT: The patients stated mood was "good" Affect was congruent with stated mood.   INTERPERSONAL BEHAVIOR: Rapport was quickly and easily established   SUICIDALITY/HOMICIDALITY: None reported  HALLUCINATIONS/DELUSIONS: None evidenced or endorsed  THOUGHT PROCESSES/INSIGHT: Thoughts seemed logical and goal-directed.   TEST TAKING BEHAVIOR and " VALIDITY: Freestanding and embedded performance validity measures and observation of effort were suggestive of adequate engagement. The current results, therefore, are likely a valid reflection of the patient's current functioning.     PROCEDURES/TESTS ADMINISTERED   In addition to performing a review of pertinent medical records, reviewing limits to confidentiality, conducting a clinical interview, and explaining procedures, the following measures were administered: [employer] (ACS) Test of Pre-Morbid Functioning (TOPF), Green's MSVT, Wechsler Adult Intelligence Scale-Fourth Edition (WAIS-IV Core) Trail Making Test (TMT-A&B; Patel et al., 2004), Verbal Fluency Test(FAS/Animals; Patel et al., 2004), NAB Naming Test, Pillo Complex Figure Copy Trial(Pillo CFT), [place] Verbal Learning Test-Second Edition (CVLT-2), Wechsler Memory Scale-Fourth Edition (WMS-IV) Designs, Symbol Span, Logical Memory and Visual Reproduction subtests, [place] Card Sorting Test (WCST-128), Grooved Pegboard (GPT; Patel et al., 2004), Finger Tapping  (FTT; Patel et al., 2004),Self: FrSBe; LUCI-7/PHQ-9 Wife: FrSBe  Manual norms were used unless otherwise indicated.  Review data Appendix Below for scores and percentiles.     BILLING     Service Description CPT Code Minutes Units   Psychiatric diagnostic evaluation by physician [id number]     Neurobehavioral status exam by physician [id number]     Test Evaluation Services   Neuropsychological testing evaluation services by physician [phone]   Each additional hour by physician [phone]   Test Administration and Scoring   Psychological or neuropsychological test administration and scoring by technician [phone]   Each additional 30 minutes by technician [phone]         DATA APPENDIX:   Note: It is important to note that scores/percentiles should only be interpreted by a neuropsychologist. It is common for healthy individuals to have 1-3 isolated low/unusual scores that  "are not indicative of any significant cognitive dysfunction.       Raw Score Type of Standardized Score Standardized Score   MSVT IR 90 - -   MSVT DR 80 - -   MSVT Cons 80 - -   MSVT PA 70 - -   MSVT FR 30 - -   TOMM 1 41 - -   TOMM 2 50 - -   TOMM R 49 - -   Dot Counting Escore 11 - -   ACS LM II Rec 19 - -   ACS VR II Rec 4 - -   ACS RDS 10 - -   HVLT-R Recognition Discrimination 8 - -   PREMORBID FUNCTIONING Raw Score Type of Standardized Score Standardized Score   TOPF simple dem. eFSIQ -    TOPF pred. eFSIQ -    TOPF simple + pred. eFSIQ -    INTELLECTUAL FUNCTIONING Raw Score Type of Standardized Score Standardized Score   WAIS-IV      VCI -    RAYMOND - SS 82   WMI - SS 95   PSI -    FSIQ -    GAI - ss 100   Similarities 31 ss 13   Vocabulary 52 ss 14   Information 17 ss 12   Block Design 24 ss 6   Matrix Reasoning 14 ss 8   Visual Puzzles 10 ss 7   Digit Span 30 ss 11         DS Forward 14 ss 14         DS Backward 7 ss 8         DS Sequence 9 ss 10         Longest Digit Forward 9 - -         Longest Digit Backward 4 - -         Longest Digit Sequence 6 - -   Arithmetic 11 ss 7   Symbol Search 40 ss 13   Coding 81 ss 12   LANGUAGE FUNCTIONING Raw Score Type of Standardized Score Standardized Score   WAIS-IV VCI -    WAIS-IV Similarities 31 ss 13   WAIS-IV Vocabulary 52 ss 14   WAIS-IV Information 17 ss 12   TOPF Word Reading 61    NAB Naming 30 Tscore 52   NAB Naming Percent Correct After Semantic Cuing 0 - -   NAB Naming Percent Correct After Phonemic Cuing 100 - -   FAS 44 Tscore 50   Animal Naming 18 Tscore 41   VISUOSPATIAL FUNCTIONING Raw Score Type of Standardized Score Standardized Score   WAIS-IV RAYMOND - SS 82   WAIS-IV Block Design 24 ss 6   WAIS-IV Matrix Reasoning 14 ss 8   WAIS-IV Visual Puzzles 10 ss 7   RCFT Copy 25.5 - -   RCFT Time to Copy 141" - -   LEARNING & MEMORY Raw Score Type of Standardized Score Standardized Score   HVLT-R      Total " Immediate 18 Tscore 25   Delayed Recall 3 Tscore <20   Retention % 43 Tscore <20   Hits 11 - -   False Positives 3 - -   Discrimination  8 Tscore <20   WMS-IV      Auditory Immediate (additional score) - SS N/A   Auditory Delayed (additional score) - SS N/A   Auditory Memory - SS N/A   Visual Memory - SS 69   Visual Working Memory - SS N/A   WMS-IV Subtests      LM I 13 ss 4   LM II 4 ss 2   LM Recognition 19 - -   Designs I 48 ss 5   Designs II 43 ss 7   Designs II Recognition 13     VR I 28 ss 5   VR II 4 ss 3   VR II Recognition 4 - -   Symbol Span 21 ss 8   ATTENTION/WORKING MEMORY Raw Score Type of Standardized Score Standardized Score   WAIS-IV WMI - SS 95   WAIS-IV Digit Span 30 ss 11         DS Forward 14 ss 14         DS Backward 7 ss 8         DS Sequence 9 ss 10         Longest Digit Forward 9 - -         Longest Digit Backward 4 - -         Longest Digit Sequence 6 - -   WAIS-IV Arithmetic 11 ss 7   MENTAL PROCESSING SPEED Raw Score Type of Standardized Score Standardized Score   WAIS-IV PSI -    WAIS-IV Symbol Search 40 ss 13   WAIS-IV Coding 81 ss 12   TMT A  44 Tscore 34   TMT A errors 1 - -   EXECUTIVE FUNCTIONING Raw Score Type of Standardized Score Standardized Score   TMT B 52 Tscore 54   TMT B errors 0 - -   WCST-128      Total Correct 60     Total Errors 12    Perseverative Resp. 4    Perseverative Err. 4    Nonperseverative Err. 8 SS 99   Concept. Level Response 60    Categories Completed 6 - -   FMS 0 - -   Learning to Learn 0.00 - -   FrSBE (Self Rating): Before      Total  101 Tscore 60   Apathy 29 Tscore 53   Disinhibition 34 Tscore 63   Executive Dysfunction 38 Tscore 58   FrSBE (Self Rating): After      Total  133 Tscore 79   Apathy 47 Tscore 82   Disinhibition 34 Tscore 63   Executive Dysfunction 52 Tscore 79   FrSBE (Family Rating): Before      Total  83 Tscore 56   Apathy 22 Tscore 50   Disinhibition 26 Tscore 57   Executive Dysfunction 35 Tscore 58    FrSBE (Family Rating): After      Total  90 Tscore 61   Apathy 21 Tscore 48   Disinhibition 26 Tscore 57   Executive Dysfunction 43 Tscore 68   FRONTOMOTOR  Raw Score Type of Standardized Score Standardized Score   GPT DH 63 Tscore 49   GPD NDH 90 Tscore 32   FTT DH 45.6 Tscore 37   FTT NDH 44.2 Tscore 40   MOOD & PERSONALITY Raw Score Type of Standardized Score Standardized Score   PHQ-9 19 - -   LUCI-7 15 - -   ss = scaled score (mean = 10, SD = 3); SS = standard score (mean = 100, SD = 15); Tscore mean = 50, SD = 10; zscore (mean = 0.00, SD = 1)

## 2023-07-28 PROBLEM — F06.70 MILD NEUROCOGNITIVE DISORDER DUE TO ANOTHER MEDICAL CONDITION: Status: ACTIVE | Noted: 2023-07-18

## 2023-07-28 NOTE — ASSESSMENT & PLAN NOTE
Assessment:  -Cognitive Testing:  Results predominantly showed significant impairment with memory (verbal and visual but visual was somewhat better). Also, findings noted increased trouble with visual-spatial skills (nonverbal reasoning, visual perception) and  increased variability with attention/working memory. Otherwise, scores were largely within expectations for verbal skills, most executive functions, mental speed, basic attention, and language measures.   -Etiology/Severity:   1. 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  2. Certainly ongoing anxiety and depression will make his cognitive functions more inefficient day-to-day.  3. Since he is only 2 months out, more recovery is certainly likely.  However, he needs an adjustment to his treatment plan to include cognitive rehabilitation over the next year.    Plan:  Neuropsychology Follow-up · Feedback session to review results/plan of care   · Reevaluation is recommended in 12-months following implementation of recommendations to track cognitive status, refine diagnosis, update treatment plan.   Neurology Follow-up  Continued Neurology follow-up    Mental Health Follow-up · Psychology/Therapy: Consultation with a therapist and will discuss   · Recommend [email or web address] for referrals/scheduling therapy.    Cognitive Rehab  Placing referral        Recommendations for Mr. Rose and Caregivers/Family:   Brain Health: · Engage in regular exercise, which increases alertness and arousal and can improve attention and focus.    · Get a good nights sleep, as this can enhance alertness and cognition.  · Eat healthy foods and balanced meals. It is notable that research indicates certain nutrients may aid in brain function, such as B vitamins (especially B6, B12, and folic acid), antioxidants (such as vitamins C and E, and beta carotene), and Omega-3 fatty acids.  Talk with your physician or nutritionist about whats right for you.   · Keep your brain active. Find activities to stay mentally active, such as reading, games (cards, checkers), puzzles (crosswords, Sudoku, jig saw), crafts (models, woodworking), gardening, or participating in activities in the community.  · Stay socially engaged. Continue staying active with your family and friends.   Sleep Tips · Poor sleep has a negative effect on cognition. Several strategies have been shown to improve sleep:   · Caffeine intake in the afternoon and evening, as well as stuffing oneself at supper, can decrease the quality of restful sleep throughout the night.   · Bedtime and wake-up times should be consistent every night and morning so the body becomes used to a single routine, even on the weekends.  · Engage in daily physical activity, but not 2-3 hours before bedtime.   · No technology use (television, computer, iPad) 1-2 hours before bed.   · Have a wind down routine (e.g., soft lights in the house, bath before bed, reduced fluid intake, songs, reading, less noise) to promote sleep readiness.   · Visit the www.sleepfoundation.org for more strategies.   Attention Tips · Remember that inattention and lack of focus are major culprits to forgetting information so be sure and practice paying attention for adequate learning of information. If you rely on passive attention to remembering something (e.g., yeah, uh-huh approach), youll find you cannot recall it later. I recommend the following to improve attention, which may aid in later recall:   · Reduce distractions as much as possible.  · Look at the person as they are speaking to you.   · Paraphrase as they are speaking  · Write down important pieces of information   · Ask people to repeat if you zone out.    · Have visual cues  (posted to-do-list, daily schedule) to remind you if you need to do something later.   Processing Speed Tips · Using multiple modalities (e.g.,  listening, writing notes, asking questions, recording) to learn new information is likely to allow additional time for processing, thus improving memory for the material.   · Allowing sufficient time to complete tasks will reduce frustration and help to ensure completion.  · Spend a lot of up-front time planning in advance how long a task may take and then chunk steps in the task so you don't wear yourself out.   Executive Functioning Tips: · Dont attempt to multi-task.  Separate tasks so that each can be completed one at a time.  · Consider using a calendar/day planner, as that may be effective to help you plan and stay on track.  Color-coding specific tasks by importance may add additional benefit to your planner.  · Break down large projects into smaller tasks and write down the steps to completing the task.    Memory Tips- Learning something (initially reading something, talking to someone) · Rehearse - Immediately after seeing/hearing something, try to recall it.  Wait a few minutes, then check again.  Gradually lengthen the intervals between rehearsals.  · Repetition of learned material is critical to ensure storage of information to be learned. Self-test at home to ensure learning.  · Write down important information to improve your attention and focus and to have something to look back on when you need to recall it.  · Make sure the person doesnt rattle off, but presents in a clear, logical, and unhurried manner.

## 2023-08-01 ENCOUNTER — PATIENT MESSAGE (OUTPATIENT)
Dept: NEUROLOGY | Facility: CLINIC | Age: 43
End: 2023-08-01
Payer: COMMERCIAL

## 2023-08-03 ENCOUNTER — PATIENT MESSAGE (OUTPATIENT)
Dept: NEUROLOGY | Facility: CLINIC | Age: 43
End: 2023-08-03
Payer: COMMERCIAL

## 2023-08-03 ENCOUNTER — OFFICE VISIT (OUTPATIENT)
Dept: NEUROLOGY | Facility: CLINIC | Age: 43
End: 2023-08-03
Payer: COMMERCIAL

## 2023-08-03 DIAGNOSIS — F06.70 MILD NEUROCOGNITIVE DISORDER DUE TO ANOTHER MEDICAL CONDITION: Primary | ICD-10-CM

## 2023-08-03 DIAGNOSIS — F43.23 ADJUSTMENT DISORDER WITH MIXED ANXIETY AND DEPRESSED MOOD: ICD-10-CM

## 2023-08-03 PROCEDURE — 99499 UNLISTED E&M SERVICE: CPT | Mod: 95,,, | Performed by: CLINICAL NEUROPSYCHOLOGIST

## 2023-08-03 PROCEDURE — 99499 NO LOS: ICD-10-PCS | Mod: 95,,, | Performed by: CLINICAL NEUROPSYCHOLOGIST

## 2023-08-03 NOTE — ASSESSMENT & PLAN NOTE
-Feedback session completed to discuss results and plan. Review Neuropsychology Consult dated for 7/20/2023 for complete details of feedback discussion, diagnosis, treatment plan.  -Additional concerns: None  -Follow-up: 12-months

## 2023-08-03 NOTE — PROGRESS NOTES
Neuropsychology Visit - Feedback (Telemedicine)    Referral Information  Name: Jayesh Rose  MRN: [id number]  : 1980  Age: 42 y.o.  Billing: Charges for Neuropsychology Feedback billed on 2023  Telemedicine:   The patient location is: Home  The provider location is:  [facility]  The chief complaint leading to consultation/medical necessity is: Feedback session for results/treatment plan discussion    Visit type: Virtual visit with synchronous audio and video    Total time spent with patient: 39-min with patient and wife  Each patient to whom he or she provides medical services by telemedicine is:  (1) informed of the relationship between the physician and patient and the respective role of any other health care provider with respect to management of the patient; and (2) notified that he or she may decline to receive medical services by telemedicine and may withdraw from such care at any time.    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 Review Neuropsychology Consult dated for 2023 for complete details of feedback discussion, diagnosis, treatment plan.  -Additional concerns: None  -Follow-up: 12-months            Psychiatric    Adjustment disorder with mixed anxiety and depressed mood

## 2023-08-10 ENCOUNTER — TELEPHONE (OUTPATIENT)
Dept: REHABILITATION | Facility: HOSPITAL | Age: 43
End: 2023-08-10
Payer: COMMERCIAL

## 2023-08-10 NOTE — TELEPHONE ENCOUNTER
I spoke to patient's wife per request from Dr. Wilkins. Wife reported that no one called to schedule speech therapy evaluation. I gave her [facility] central schedulers phone number and assured her I will send a direct message to them to get the patient scheduled.      It was determined that our staff left several messages possibly on the patient's phone. ([phone] [facility] DMG 7/31 [facility] CNW 7/27  FULL. SENT SMS. CN)    Staff will call wife to schedule speech therapy evaluation appointment at [facility] location with me.    MICHAEL Hernandez., CCC-SLP, CBIS  Speech-Language Pathologist  Certified Brain Injury Specialist

## 2023-08-16 ENCOUNTER — PATIENT MESSAGE (OUTPATIENT)
Dept: NEUROLOGY | Facility: CLINIC | Age: 43
End: 2023-08-16
Payer: COMMERCIAL

## 2023-08-24 ENCOUNTER — CLINICAL SUPPORT (OUTPATIENT)
Dept: REHABILITATION | Facility: HOSPITAL | Age: 43
End: 2023-08-24
Attending: CLINICAL NEUROPSYCHOLOGIST
Payer: COMMERCIAL

## 2023-08-24 DIAGNOSIS — G04.81 AUTOIMMUNE ENCEPHALITIS: ICD-10-CM

## 2023-08-24 DIAGNOSIS — F06.70 MILD NEUROCOGNITIVE DISORDER DUE TO ANOTHER MEDICAL CONDITION: ICD-10-CM

## 2023-08-24 DIAGNOSIS — R41.841 COGNITIVE COMMUNICATION DISORDER: Primary | ICD-10-CM

## 2023-08-24 PROCEDURE — 96125 COGNITIVE TEST BY HC PRO: CPT

## 2023-08-24 NOTE — PLAN OF CARE
"[facility]  Speech Therapy Evaluation -Neurological Rehabilitation    Date: 8/24/2023     Name: Jayesh Rose   MRN: [id number]    Therapy Diagnosis:   Encounter Diagnoses   Name Primary?    Mild neurocognitive disorder due to another medical condition     Autoimmune encephalitis     Cognitive communication disorder Yes     Physician: INGRID Wilkins II, PhD  Physician Orders: Ambulatory Referral to Speech Therapy   Medical Diagnosis: Mild neurocognitive disorder due to another medical condition [F06.70], Autoimmune encephalitis [G04.81]    Visit # / Visits Authorized:  1/ 1   Date of Evaluation:  8/24/2023   Insurance Authorization Period: 7/28/2023 to 12/31/2023  Plan of Care Certification:    8/24/2023 to 10/18/2023      Time In:2:20PM   Time Out: 3:03PM   Total time: 43 mins    Procedure   Cognitive Communication Evaluation including scoring and interpretation (Total time: 60 minutes)     Precautions: Standard  Subjective   Date of Onset: May 2023 after experiencing a seizure.  History of Current Condition:  Jayesh Rose is a 43 y.o. male who presents to [facility] Outpatient Speech Therapy for evaluation secondary to Mild neurocognitive disorder due to another medical condition [F06.70], Autoimmune encephalitis [G04.81]. Patient was referred to therapy by INGRID Wilkins II, PhD , which is the patient's Neuropsychologist. Patient reports he has been experiencing reduced memory recall; stating everyday feels like a new day and the "past seems like a smear". During ADLs, he stresses about the little things reporting, "small things seem very huge." Patient also reports that since his cognitive changes have began, he has also experienced increased anxiety and depression.  Patient attended the evaluation by himself.  Past Medical History: Jayesh Rose  has a past medical history of Anxiety (5/18/2023), Class 1 obesity in adult (5/18/2023), GERD " "(gastroesophageal reflux disease) (1/10/2019), and Seizures.  Jayesh Rose  has no past surgical history on file.  Medical Hx and Allergies: Jayesh has a current medication list which includes the following prescription(s): guaifenesin, ibuprofen, levetiracetam, multivitamin, omeprazole, and sertraline. Review of patient's allergies indicates:  No Known Allergies  Prior Therapy:  None reported   Social History:  Patient lives with wife and 2 year old son in [place]. Patient is currently driving  Occupation:   for [employer]  Prior Level of Function: Within normal/functional limits   Current Level of Function: Deficits with attention and memory  Pain Scale: no pain indicated throughout session  Patient's Therapy Goals:  Be able to "take better inventory of things and get sense of time back"  Objective   Formal Assessment:  Cognitive Linguistic Quick Test (CLQT), Aphasia Administration was administered to quickly assess the patient's overall cognitive-linguistic function and to determine cognitive strengths and weaknesses.     Cognitive Domain  Score  Severity Rating    Attention 197/215 WFL   Memory 132/185 moderate   Executive Functions 27/40 WFL   Language 29/37 WFL   Visuospatial Skills 80/105 mild   Clock Drawing 12/13 [facility]          Composite Score = 3.4/4 mild     Task Score Ages 18-69 Cut Score Ages 70-89 Cut Score Below?   Personal Facts  8  8 8 average   Symbol Cancellation 12  11 10 average   Confrontational naming  10  10 10 average   Clock drawing  12  12 11 average   Story Retelling 3  6 5 below average   Symbol Trails 9  9 6 average   Generative Naming 8  5 4 above average   Design Memory 5  5 4 average   Mazes 4  7 4 below average   Design Generation  6  6 5 average       Cognition: Cognitive communication skills are considered mildly impaired. Patient answered orientation questions with 100% accuracy. Patient cancelled pre-determined symbol out of a field of many " similar looking symbols with 100% accuracy, indicating intact selective attention skills. Patient named line item photographs with 100% accuracy. Patient scored 12  (cut off score 12) on clock drawing task, indicating intact planning, organizing, self-monitoring, and self-correction as the patient's clock model contained 12/12 numbers with appropriate spacing and orientation, 2/2 hands, and was accurately set to the correct time. Patient recalled 5/18 details from a paragraph presented auditorily. Patient answered 3/6 y/n questions about the paragraph indicating intact comprehension but impaired auditory recall. Patient completed a symbol trails task (alternating size and shape) with 90% acc indicating intact divided attention.  Patient completed divergent naming of concrete categories with 24 named items within one minute; completed divergent naming of abstract categories with 19 named items within one minute.  Patient recalled  5/6 designs in a design recall task indicating intact visual recall skills. Patient completed a simple maze with 100% acc; completed a complex maze with 0% acc indicating impaired planning and organization for simple and complex information. Patient created 6 designs with 4 lines, 3 designs with more than 4 lines, 1 designs with less than 4 lines, and 2 perseverative designs indicating mild difficulty with mental flexibility skills.    Patient was alert and cooperative throughout evaluation. He was oriented to person, time, place, and situation. He did not require cues to attend to evaluation tasks throughout session. Patient with insight into severity of deficits.    Description: Results of the standardized assessment Cognitive Linguistic Quick Test indicate patient presents with a mild cognitive-communication disorder; specifically with deficits in memory, planning and organization of information, and mental flexibility.     The Cognitive Communication Checklist for Acquired Brain Injury  (CCCABI): The CCCABI is a referral tool designed to help flag communication difficulties after brain injury. This questionnaire screens for dysfunction in six domains: Functional Daily Communication, Auditory Comprehension & Information Processing, Expression, Discourse & Social Communication, Reading Comprehension, Written Expression, and Executive Functions & Self-Regulation. The results of the questionnaire are presented below.    Patient completed the questionnaire and  9/45 cognitive communication concerns were identified. These are listed below.    For the below categories only the patients self-identified complaints are listed for each domain.    Domain Patient Self-Identified Complaints   Functional Daily Communications Difficulties with:  Family or social communications   Auditory Comprehension & Information Processing Difficulties with:  Focusing attention on what is said  Holding thoughts in mind while talking or listening   Expression, Discourse & Social Communication Difficulties with:  No difficulties reported in this domain    Reading Comprehension Difficulties with:  Retaining read information over time, remembering, organizing  Reduced stamina for reading   Written Expression Difficulties with:  No difficulties reported in this domain   Thinking, Reasoning, Problem Solving, Executive Functions, Self-Regulation Difficulties with:  Discussing without being overwhelmed, upset, withdrawn  Filtering out less relevant information, focusing on priorities, main points   Reference: Telma Paris (2015) Cognitive Communication Checklist for Acquired Brain Injury (CCCABI) [employer] [facility]; [facility], [place], N1H 6J2 , [email or web address]\    Treatment   Total Treatment Time Separate from Evaluation: not applicable   No treatment performed secondary to time to complete evaluation.    Education provided:   -role of Speech Therapy, goals/plan of care, scheduling/cancellations, insurance limitations  "with patient  -Additional Education provided:   Memory strategies  Attention Strategies  Patient expressed understanding.     Home Program: No home exercise program implemented currently. Patient provided with "W.R.A.P" memory strategies.  Assessment     Jake presents to [facility] status post medical diagnosis of Mild neurocognitive disorder due to another medical condition [F06.70], Autoimmune encephalitis [G04.81].     Interpretation of objective assessment:   He presents with mild cognitive-communication disorder with deficits in memory, planning and organization of information, and mental flexibility. Patient is aware of deficits, and the impact his deficits are making to his overall quality of life.     Demonstrates impairments including limitations as described in the problem list.     Positive prognostic factors: Awareness to deficits and independent use of strategies reported.  Negative prognostic factors: None noted.  Barriers to therapy: No barriers to therapy identified.     Patient's spiritual, cultural, and educational needs considered and patient agreeable to plan of care and goals.    Patient will benefit from skilled therapy.    Rehab Potential: good    Short Term Goals: (4 weeks) Current Progress:   Patient will complete moderate level auditory memory tasks while utilizing learned memory strategies with 90% accuracy independently.    Progressing/ Not Met 8/24/2023   Established this date   2.  Patient will complete high level problem solving tasks with 90% accuracy independently.    Progressing/ Not Met 8/24/2023   Established this date   3. Patient will complete Goal-Plan-Action-Review with 90% accuracy independely.      Progressing/ Not Met 8/24/2023   Established this date    4. Patient will independently recall and utilize strategies when needed to reduce cognitive load during functional task. Cognitive load measured before and after task using the Relative Cognitive "     Progressing/ Not Met 8/24/2023   Established this date        Long Term Goals: (8 weeks) Current Progress:   Patient will increase overall memory, problem solving, and mental flexibility skills for improved cognitive functioning and functional carryover during vocational duties and ADLs. Established this date         Plan     Recommended Treatment Plan:  Patient will participate in the [facility] rehabilitation program for speech therapy 1 times per week for 8 weeks to address his Cognition deficits, to educate patient and their family, and to participate in a home exercise program.    Other Recommendations:   No recommendations for referral warranted at this time.    Therapist's Name:   ZEE Beaver-SLP  Speech-Language Pathologist  8/24/2023     I certify the need for these services furnished under this plan of treatment and while under my care.    ____________________________________ Physician/Referring Practitioner   Date of Signature:

## 2023-08-29 ENCOUNTER — CLINICAL SUPPORT (OUTPATIENT)
Dept: REHABILITATION | Facility: HOSPITAL | Age: 43
End: 2023-08-29
Payer: COMMERCIAL

## 2023-08-29 DIAGNOSIS — R41.841 COGNITIVE COMMUNICATION DISORDER: Primary | ICD-10-CM

## 2023-08-29 PROCEDURE — 97130 THER IVNTJ EA ADDL 15 MIN: CPT

## 2023-08-29 PROCEDURE — 97129 THER IVNTJ 1ST 15 MIN: CPT

## 2023-08-29 NOTE — PROGRESS NOTES
[facility]  Speech Therapy Treatment Note- Neurological Rehabilitation  Date: 8/29/2023     Name: Jayesh Rose   MRN: [id number]   Therapy Diagnosis:   Encounter Diagnosis   Name Primary?    Cognitive communication disorder Yes     Physician: INGRID Wilkins II, PhD  Physician Orders: Ambulatory Referral to Speech Therapy   Medical Diagnosis: Mild neurocognitive disorder due to another medical condition [F06.70], Autoimmune encephalitis [G04.81]    Visit #/ Visits Authorized: 1/ 20  Date of Evaluation:  10/24/2023  Insurance Authorization Period: 8/28/2023 to 12/31/2023  Plan of Care Expiration Date: 10/18/2023  Extended Plan of Care:  N/A   Progress Note: 9/29/2023     Time In:  9:29AM  Time Out:  10:14AM  Total Billable Time: 45     Precautions: Standard and Cognition  Subjective:   Patient reports: That his cognition has been better today than it typically is any other day. He believes that could be due to the 12 hours of sleep that he was able to get. He also reported that in the last couple of days he has been able to focus more in conversation.   He was compliant to home exercise program.   Response to previous treatment: good  Pain Scale: no pain indicated throughout session  Objective:   TIMED  Procedure Min.   Cognitive Therapeutic Interventions, first 15 minutes CPT 77691  15   Cognitive Therapeutic Interventions, each additional 15 minutes CPT 22637  30           Short Term Goals: (4 weeks) Current Progress:   Patient will complete moderate level auditory memory tasks while utilizing learned memory strategies with 90% accuracy independently.    Progressing/ Not Met 8/24/2023   Completed auditory memory task of recalling newspaper ad information with 36% accuracy independently; and 75% given minimal verbal cues.  *Note: questions provided verbally are noted to be more difficult for patient to when recalling information.    CT Understand Stories you hear at level 5 with 90% accuracy  independently.    CT Remember written words in order level 2 with 86% accuracy independently.   2.  Patient will complete high level problem solving tasks with 90% accuracy independently.    Progressing/ Not Met 2023   Patient completed deductive reasoning worksheet #2 with 45% accuracy independently.        3. Patient will complete Goal-Plan-Action-Review with 90% accuracy independently.      Progressing/ Not Met 2023   Patient completed 15 minutes of Goal-Plan-Action-Review task alternating between:   1) Deductive reasoning worksheet  2) CT Remember written words in order Level 2   3) Organizing deck of cards    Patient self-rated a cognitive load of 4/7 on the Relative Scale of Cognitive Load.   4. Patient will independently recall and utilize strategies when needed to reduce cognitive load during functional task. Cognitive load measured before and after task using the Relative Cognitive     Progressing/ Not Met 2023   Relative scale of cognitive load was introduced and an education was provided regarding cognitive load after brain injury.     Patient Education/Response:   Patient educated regarding the followin. Plan of care  2.Goal-Plan-Action Review  3. Memory strategies- W.R.A.P.    Home program established: yes-patient to work on 2 deductive reasoning puzzle worksheets  Patient verbalized understanding to all above education provided.     See Electronic Medical Record under Patient Instructions for exercises provided throughout therapy.  Assessment:   Jake actively participated in today's session focusing on plan of care review, memory, mental flexibility, problem-solving, and education. Speech Therapist reviewed results of initial evaluation, short-term goals, and long-term goals. Patient continues to display self-awareness to current deficits. Patient's strengths include motivation, attention to task, and willingness to learn strategies needed to improve overall cognitive deficits.  Difficulty noted in recalling information received auditorily and questioned verbally. Patient also noted to have difficulty completing deductive during a time pressured activity (Goal-Plan-Action-Review). Jake is progressing well towards his goals. Current goals remain appropriate. Goals to be updated as necessary.     Patient prognosis is Good. Patient will continue to benefit from skilled outpatient speech and language therapy to address the deficits listed in the problem list on initial evaluation, provide patient/family education and to maximize patient's level of independence in the home and community environment.   Medical necessity is demonstrated by the following IMPAIRMENTS:  Cognitive-communication deficits, specifically deficits in memory mental flexibility, and problem-solving, affect the patient's overall quality of life and overall functioning during activities of daily life.  Barriers to Therapy: None noted at this time.  Patient's spiritual, cultural and educational needs considered and patient agreeable to plan of care and goals.  Plan:   Continue Plan of Care with focus on rehabilitation and compensation for cognitive-communication disorder.    ZEE Beaver-SLP  Speech-Language Pathologist  8/29/2023

## 2023-08-31 ENCOUNTER — CLINICAL SUPPORT (OUTPATIENT)
Dept: REHABILITATION | Facility: HOSPITAL | Age: 43
End: 2023-08-31
Payer: COMMERCIAL

## 2023-08-31 DIAGNOSIS — R41.841 COGNITIVE COMMUNICATION DEFICIT: ICD-10-CM

## 2023-08-31 PROCEDURE — 97129 THER IVNTJ 1ST 15 MIN: CPT | Mod: PO

## 2023-08-31 PROCEDURE — 97130 THER IVNTJ EA ADDL 15 MIN: CPT | Mod: PO

## 2023-08-31 NOTE — PROGRESS NOTES
[facility]  Speech Therapy Treatment Note- Neurological Rehabilitation  Date: 8/31/2023     Name: Jayesh Rose   MRN: [id number]   Therapy Diagnosis:   Encounter Diagnosis   Name Primary?    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 Did not need to use GPS to get to therapy today.     He was compliant to home exercise program.   Response to previous treatment: good  Pain Scale: no pain indicated throughout session  Objective:   TIMED  Procedure Min.   Cognitive Therapeutic Interventions, first 15 minutes CPT 64249  15   Cognitive Therapeutic Interventions, each additional 15 minutes CPT 21155  33           Short Term Goals: (4 weeks) Current Progress:   Patient will complete moderate level auditory memory tasks while utilizing learned memory strategies with 90% accuracy independently.    Progressing/ Not Met      CT remember spoken word L1- 97% accuracy independently     Met x 1   2.  Patient will complete high level problem solving tasks with 90% accuracy independently.    Progressing/ Not Met      Patient completed deductive reasoning worksheet #3 with 73% accuracy independently, 100% accuracy given minimum cues to pay attention to details.        3. Patient will complete Goal-Plan-Action-Review with 90% accuracy independently.      Progressing/ Not Met     Patient  completed 5 minute   intervals Goal-Plan-Action-Review task alternating between:   1) Deduction puzzle #3- 73% accuracy independently, 100% accuracy minimum cues to cross off items he completed and then he corrected some of his errors and to pay attention to details.     2) CT remember spoken word L1- 97% accuracy independently     3) Constant Therapy - (alternate uppercase/lowercase words in alphabetical order) L3- 87% accuracy independently.     Patient self-rated a cognitive load of 4/7 on the Relative Scale of Cognitive Load for deduction puzzle.   4. Patient will independently recall and utilize strategies when needed to reduce cognitive load during functional task. Cognitive load measured before and after task using the Relative Cognitive     Progressing/ Not Met    Patient self-rated a cognitive load of 4/7 on the Relative Scale of Cognitive Load for deduction puzzle.    Patient reported resting more has helped him think clearly. He prioritizes his daily tasks. Less stress for simple routine tasks.      Patient Education/Response:   Patient educated regarding the followin. Plan of care  2.Goal-Plan-Action Review  3. Memory strategies- W.R.A.P.    Home program established: yes-patient to work on 2 deductive reasoning puzzle worksheets      Patient verbalized understanding to all above education provided.     See Electronic Medical Record under Patient Instructions for exercises provided throughout therapy  Assessment:   Jake actively participated in today's session focusing on plan of care review, memory, mental flexibility, problem-solving, and education. Patient continues to display self-awareness to current deficits. Patient's strengths include motivation, attention to task, and willingness to learn strategies needed to improve overall cognitive deficits. Difficulty noted in paying attention to details for complex tasks such as deduction puzzle and alternating words. Patient also noted to do better with  alternating attention for 2 different tasks during a longer time pressured activity (Goal-Plan-Action-Review). Jake is progressing well towards his goals. Current goals remain appropriate. Goals to be updated as necessary.   Patient prognosis is Good. Patient will continue to benefit from skilled outpatient speech and language therapy to address the deficits listed in the problem list on initial evaluation, provide patient/family education and to maximize patient's level of independence in the home and community environment.   Medical necessity is demonstrated by the following IMPAIRMENTS:  Cognitive-communication deficits, specifically deficits in memory mental flexibility, and problem-solving, affect the patient's overall quality of life and overall functioning during activities of daily life.  Barriers to Therapy: None noted at this time.  Patient's spiritual, cultural and educational needs considered and patient agreeable to plan of care and goals.  Plan:   Continue Plan of Care with focus on rehabilitation and compensation for cognitive-communication disorder. Decrease to 1 x per week.     MICHAEL Hernandez., CCC-SLP, CBIS  Speech-Language Pathologist  Certified Brain Injury Specialist   8/31/2023

## 2023-09-12 ENCOUNTER — TELEPHONE (OUTPATIENT)
Dept: REHABILITATION | Facility: HOSPITAL | Age: 43
End: 2023-09-12
Payer: COMMERCIAL

## 2023-09-12 DIAGNOSIS — R41.841 COGNITIVE COMMUNICATION DEFICIT: Primary | ICD-10-CM

## 2023-09-12 NOTE — TELEPHONE ENCOUNTER
No Show Note/Documentation    Patient: Jayesh Rose  Date of Session: 9/12/2023  Diagnosis:   Encounter Diagnosis   Name Primary?    Cognitive communication deficit Yes   MRN: [id number]    Jayesh Rose did not attend his  scheduled therapy appointment today. He did not call to cancel nor reschedule. This is the 1st appointment that he has not attended. Speech Therapist called patient. Patient did not answer, but a voicemail was left. Next appointment is scheduled for 9/19/2023 and will follow up with patient at that time. No charges have been posted today.     Harley Wolf CCC-SLP   9/12/2023

## 2023-09-19 ENCOUNTER — DOCUMENTATION ONLY (OUTPATIENT)
Dept: REHABILITATION | Facility: HOSPITAL | Age: 43
End: 2023-09-19
Payer: COMMERCIAL

## 2023-09-19 DIAGNOSIS — R41.841 COGNITIVE COMMUNICATION DEFICIT: Primary | ICD-10-CM

## 2023-09-19 NOTE — PROGRESS NOTES
No Show Note/Documentation    Patient: Jayesh Rose  Date of Session: 9/19/2023  Diagnosis:   Encounter Diagnosis   Name Primary?    Cognitive communication deficit Yes     MRN: [id number]    Jayesh Rose did not attend his  scheduled therapy appointment today. He did not call to cancel nor reschedule. This is the 2 appointment that he has not attended. Next appointment is scheduled for 9/26/2023 and will follow up with patient at that time. No charges have been posted today.     Harley Wolf CCC-SLP   9/19/2023

## 2023-09-26 ENCOUNTER — TELEPHONE (OUTPATIENT)
Dept: REHABILITATION | Facility: HOSPITAL | Age: 43
End: 2023-09-26
Payer: COMMERCIAL

## 2023-09-26 DIAGNOSIS — R41.841 COGNITIVE COMMUNICATION DEFICIT: Primary | ICD-10-CM

## 2023-09-26 NOTE — TELEPHONE ENCOUNTER
No Show Note/Documentation    Patient: Jayesh Rose  Date of Session: 9/26/2023  Diagnosis:   Encounter Diagnosis   Name Primary?    Cognitive communication deficit Yes      MRN: [id number]    Jayesh Rose did not attend his  scheduled therapy appointment today. He did not call to cancel nor reschedule. This is the 3rd appointment that he has not attended. Patient was contacted via phone call. Patient expressed his schedule currently has become very busy due to work. No charges have been posted today.     Harley Wolf CCC-SLP   9/26/2023

## 2023-11-06 ENCOUNTER — PATIENT MESSAGE (OUTPATIENT)
Dept: NEUROLOGY | Facility: CLINIC | Age: 43
End: 2023-11-06
Payer: COMMERCIAL

## 2023-11-07 ENCOUNTER — HOSPITAL ENCOUNTER (EMERGENCY)
Facility: HOSPITAL | Age: 43
Discharge: HOME | End: 2023-11-07
Attending: STUDENT IN AN ORGANIZED HEALTH CARE EDUCATION/TRAINING PROGRAM
Payer: COMMERCIAL

## 2023-11-07 VITALS
BODY MASS INDEX: 27.26 KG/M2 | TEMPERATURE: 99 F | DIASTOLIC BLOOD PRESSURE: 78 MMHG | WEIGHT: 190 LBS | HEART RATE: 98 BPM | RESPIRATION RATE: 20 BRPM | SYSTOLIC BLOOD PRESSURE: 128 MMHG | OXYGEN SATURATION: 100 %

## 2023-11-07 DIAGNOSIS — F41.9 ANXIETY: ICD-10-CM

## 2023-11-07 DIAGNOSIS — F41.0 PANIC ATTACK: Primary | ICD-10-CM

## 2023-11-07 LAB
ALBUMIN SERPL BCP-MCNC: 4.6 G/DL (ref 3.5–5.2)
ALP SERPL-CCNC: 74 U/L (ref 55–135)
ALT SERPL W/O P-5'-P-CCNC: 13 U/L (ref 10–44)
AMORPH CRY UR QL COMP ASSIST: NORMAL
AMPHET+METHAMPHET UR QL: NEGATIVE
ANION GAP SERPL CALC-SCNC: 14 MMOL/L (ref 8–16)
APAP SERPL-MCNC: <3 UG/ML (ref 10–20)
AST SERPL-CCNC: 14 U/L (ref 10–40)
BACTERIA #/AREA URNS AUTO: NORMAL /HPF
BARBITURATES UR QL SCN>200 NG/ML: NEGATIVE
BASOPHILS # BLD AUTO: 0.03 K/UL (ref 0–0.2)
BASOPHILS NFR BLD: 0.3 % (ref 0–1.9)
BENZODIAZ UR QL SCN>200 NG/ML: NEGATIVE
BILIRUB SERPL-MCNC: 0.8 MG/DL (ref 0.1–1)
BILIRUB UR QL STRIP: NEGATIVE
BUN SERPL-MCNC: 12 MG/DL (ref 6–20)
BZE UR QL SCN: NEGATIVE
CALCIUM SERPL-MCNC: 9.8 MG/DL (ref 8.7–10.5)
CANNABINOIDS UR QL SCN: ABNORMAL
CHLORIDE SERPL-SCNC: 105 MMOL/L (ref 95–110)
CLARITY UR REFRACT.AUTO: ABNORMAL
CO2 SERPL-SCNC: 22 MMOL/L (ref 23–29)
COLOR UR AUTO: ABNORMAL
CREAT SERPL-MCNC: 1.3 MG/DL (ref 0.5–1.4)
CREAT UR-MCNC: 440 MG/DL (ref 23–375)
DIFFERENTIAL METHOD: ABNORMAL
EOSINOPHIL # BLD AUTO: 0 K/UL (ref 0–0.5)
EOSINOPHIL NFR BLD: 0.1 % (ref 0–8)
ERYTHROCYTE [DISTWIDTH] IN BLOOD BY AUTOMATED COUNT: 14.9 % (ref 11.5–14.5)
EST. GFR  (NO RACE VARIABLE): >60 ML/MIN/1.73 M^2
ETHANOL SERPL-MCNC: <10 MG/DL
GLUCOSE SERPL-MCNC: 100 MG/DL (ref 70–110)
GLUCOSE UR QL STRIP: NEGATIVE
HCT VFR BLD AUTO: 46.2 % (ref 40–54)
HGB BLD-MCNC: 15.4 G/DL (ref 14–18)
HGB UR QL STRIP: NEGATIVE
HYALINE CASTS UR QL AUTO: 0 /LPF
IMM GRANULOCYTES # BLD AUTO: 0.04 K/UL (ref 0–0.04)
IMM GRANULOCYTES NFR BLD AUTO: 0.4 % (ref 0–0.5)
KETONES UR QL STRIP: ABNORMAL
LEUKOCYTE ESTERASE UR QL STRIP: NEGATIVE
LYMPHOCYTES # BLD AUTO: 1.9 K/UL (ref 1–4.8)
LYMPHOCYTES NFR BLD: 18.1 % (ref 18–48)
MCH RBC QN AUTO: 25.4 PG (ref 27–31)
MCHC RBC AUTO-ENTMCNC: 33.3 G/DL (ref 32–36)
MCV RBC AUTO: 76 FL (ref 82–98)
METHADONE UR QL SCN>300 NG/ML: NEGATIVE
MICROSCOPIC COMMENT: NORMAL
MONOCYTES # BLD AUTO: 0.8 K/UL (ref 0.3–1)
MONOCYTES NFR BLD: 7.4 % (ref 4–15)
NEUTROPHILS # BLD AUTO: 7.6 K/UL (ref 1.8–7.7)
NEUTROPHILS NFR BLD: 73.7 % (ref 38–73)
NITRITE UR QL STRIP: NEGATIVE
NRBC BLD-RTO: 0 /100 WBC
OPIATES UR QL SCN: NEGATIVE
PCP UR QL SCN>25 NG/ML: NEGATIVE
PH UR STRIP: 6 [PH] (ref 5–8)
PLATELET # BLD AUTO: 324 K/UL (ref 150–450)
PMV BLD AUTO: 11.4 FL (ref 9.2–12.9)
POTASSIUM SERPL-SCNC: 3.3 MMOL/L (ref 3.5–5.1)
PROT SERPL-MCNC: 8.2 G/DL (ref 6–8.4)
PROT UR QL STRIP: ABNORMAL
RBC # BLD AUTO: 6.06 M/UL (ref 4.6–6.2)
RBC #/AREA URNS AUTO: 0 /HPF (ref 0–4)
SODIUM SERPL-SCNC: 141 MMOL/L (ref 136–145)
SP GR UR STRIP: 1.03 (ref 1–1.03)
T4 FREE SERPL-MCNC: 1.25 NG/DL (ref 0.71–1.51)
TOXICOLOGY INFORMATION: ABNORMAL
TSH SERPL DL<=0.005 MIU/L-ACNC: 0.4 UIU/ML (ref 0.4–4)
URN SPEC COLLECT METH UR: ABNORMAL
WBC # BLD AUTO: 10.25 K/UL (ref 3.9–12.7)
WBC #/AREA URNS AUTO: 0 /HPF (ref 0–5)

## 2023-11-07 PROCEDURE — 84439 ASSAY OF FREE THYROXINE: CPT

## 2023-11-07 PROCEDURE — 93010 EKG 12-LEAD: ICD-10-PCS | Mod: ,,, | Performed by: INTERNAL MEDICINE

## 2023-11-07 PROCEDURE — 84443 ASSAY THYROID STIM HORMONE: CPT

## 2023-11-07 PROCEDURE — 93005 ELECTROCARDIOGRAM TRACING: CPT

## 2023-11-07 PROCEDURE — 80307 DRUG TEST PRSMV CHEM ANLYZR: CPT

## 2023-11-07 PROCEDURE — 25000003 PHARM REV CODE 250

## 2023-11-07 PROCEDURE — 81001 URINALYSIS AUTO W/SCOPE: CPT | Mod: XB

## 2023-11-07 PROCEDURE — 82077 ASSAY SPEC XCP UR&BREATH IA: CPT

## 2023-11-07 PROCEDURE — 80143 DRUG ASSAY ACETAMINOPHEN: CPT

## 2023-11-07 PROCEDURE — 99285 EMERGENCY DEPT VISIT HI MDM: CPT | Mod: 25

## 2023-11-07 PROCEDURE — 85025 COMPLETE CBC W/AUTO DIFF WBC: CPT

## 2023-11-07 PROCEDURE — 93010 ELECTROCARDIOGRAM REPORT: CPT | Mod: ,,, | Performed by: INTERNAL MEDICINE

## 2023-11-07 PROCEDURE — 80053 COMPREHEN METABOLIC PANEL: CPT

## 2023-11-07 RX ORDER — DIAZEPAM 5 MG/1
5 TABLET ORAL DAILY
Qty: 5 TABLET | Refills: 0 | Status: SHIPPED | OUTPATIENT
Start: 2023-11-07 | End: 2024-01-10

## 2023-11-07 RX ORDER — DIAZEPAM 5 MG/1
5 TABLET ORAL
Status: COMPLETED | OUTPATIENT
Start: 2023-11-07 | End: 2023-11-07

## 2023-11-07 RX ADMIN — DIAZEPAM 5 MG: 5 TABLET ORAL at 11:11

## 2023-11-07 NOTE — ED TRIAGE NOTES
Patient presents to the ED with complaints of shakes, dry heaves, confusion, loss of time, states he feels disoriented and just not here. Patient able to answer all orientation questions. Patient denies any pain. Endorses numbness to both hands and feet. Endorses numbness to face. States symptoms started 4 days ago.

## 2023-11-07 NOTE — ED NOTES
Patient placed in hospital gown at this time. Patient placed on cardiac monitor, bp cuff, and continuous pulse ox. Call light within reach. Family at bedside.

## 2023-11-07 NOTE — DISCHARGE INSTRUCTIONS
Please do not take benzodiazapines such as valium while operating heavy machinery or vehicles. Please return to the emergency department for worsening of symptoms or new symptoms including but not limited to CP and SOB. Please do not use marijuana as this may have contributed to your symptoms. Please follow up with your PCP and psychiatry referral.

## 2023-11-07 NOTE — ED PROVIDER NOTES
"Encounter Date: 11/7/2023       History     Chief Complaint   Patient presents with    Numbness     Numbness and tingling to both hands and feet, states feels "disoriented", hx of seizures states last one in may but unsure, pt is answering questions in triage      43M w/ hx of seizures, encephalitis, anxiety who presents with panic attacks. He describes his panic attacks as beginning 4 days ago, associated with numbness to his bilateral arms, hands, feet and jaw, intermittent chest tightness, difficulty taking a deep breath, dry heaving, disorientation, and paranoia. He states that he "feels like someone is watching" him but that he knows that this is unreasonable. He reports that he does not feel safe at home for this reason. He says that he "feels like he is falling" and "can tell that I am at home but not sure if I am dreaming" during these events. Patient also reports recent memory impairment. He denies having any urinary or fecal incontinence during these episodes. He is accompanied by his wife who is concerned that these episodes may be seizures or panic attacks. He recently began taking lexapro and buspirone on 11/2. Today he has taken his lexapro and keppra. His last known seizure was October 2020 per wife. Reports getting about 4 hours of sleep per night. Denies SI/HI.       Review of patient's allergies indicates:  No Known Allergies  Past Medical History:   Diagnosis Date    Anxiety 5/18/2023    Class 1 obesity in adult 5/18/2023    GERD (gastroesophageal reflux disease) 1/10/2019    Seizures      History reviewed. No pertinent surgical history.  Family History   Problem Relation Age of Onset    Mental illness Mother     Heart disease Father     Mental illness Father     Heart disease Brother     Cancer Maternal Grandfather     Cancer Paternal Grandfather      Social History     Tobacco Use    Smoking status: Never     Passive exposure: Never    Smokeless tobacco: Never   Substance Use Topics    Drug use: " No     Review of Systems as per HPI    Physical Exam     Initial Vitals [11/07/23 0906]   BP Pulse Resp Temp SpO2   (!) 152/78 97 18 98.5 °F (36.9 °C) 99 %      MAP       --         Physical Exam    Nursing note and vitals reviewed.  Constitutional: He appears well-developed. He is not diaphoretic. He appears distressed.   HENT:   Head: Normocephalic.   Eyes: Right eye exhibits no discharge. Left eye exhibits no discharge.   Cardiovascular:  Regular rhythm and normal heart sounds.   Tachycardia present.         Pulmonary/Chest: Breath sounds normal. No respiratory distress. He has no wheezes. He has no rhonchi. He has no rales.   Abdominal: Bowel sounds are normal.   Musculoskeletal:         General: No edema.     Neurological: He is alert and oriented to person, place, and time. He has normal strength. No cranial nerve deficit or sensory deficit. GCS score is 15. GCS eye subscore is 4. GCS verbal subscore is 5. GCS motor subscore is 6.   No facial droop  Moves all extremities spontaneously  SILT x 4  Responding appropriately to questions   Skin: Skin is warm.   Psychiatric: His mood appears anxious. His speech is rapid and/or pressured (mildly). He is not agitated, not aggressive and not withdrawn. Thought content is paranoid. He expresses no homicidal and no suicidal ideation.         ED Course   Procedures  Labs Reviewed   CBC W/ AUTO DIFFERENTIAL - Abnormal; Notable for the following components:       Result Value    MCV 76 (*)     MCH 25.4 (*)     RDW 14.9 (*)     Gran % 73.7 (*)     All other components within normal limits   COMPREHENSIVE METABOLIC PANEL - Abnormal; Notable for the following components:    Potassium 3.3 (*)     CO2 22 (*)     All other components within normal limits   TSH - Abnormal; Notable for the following components:    TSH 0.399 (*)     All other components within normal limits   URINALYSIS, REFLEX TO URINE CULTURE - Abnormal; Notable for the following components:    Appearance, UA  Cloudy (*)     Protein, UA 1+ (*)     Ketones, UA 1+ (*)     All other components within normal limits    Narrative:     Specimen Source->Urine   DRUG SCREEN PANEL, URINE EMERGENCY - Abnormal; Notable for the following components:    THC Presumptive Positive (*)     Creatinine, Urine 440.0 (*)     All other components within normal limits    Narrative:     Specimen Source->Urine   ACETAMINOPHEN LEVEL - Abnormal; Notable for the following components:    Acetaminophen (Tylenol), Serum <3.0 (*)     All other components within normal limits   ALCOHOL,MEDICAL (ETHANOL)   T4, FREE   URINALYSIS MICROSCOPIC    Narrative:     Specimen Source->Urine        ECG Results              EKG 12-lead (Final result)  Result time 11/07/23 14:27:59      Final result by Interface, Lab In [facility] (11/07/23 14:27:59)                   Narrative:    Test Reason :     Vent. Rate : 127 BPM     Atrial Rate : 127 BPM     P-R Int : 120 ms          QRS Dur : 086 ms      QT Int : 314 ms       P-R-T Axes : 069 051 062 degrees     QTc Int : 456 ms    Sinus tachycardia  Possible Left atrial enlargement  Nonspecific ST abnormality  Abnormal ECG  When compared with ECG of 07-NOV-2023 10:12,  Vent. rate has increased BY  50 BPM  Confirmed by Tyrone Whitt MD ([id number]) on 11/7/2023 2:27:41 PM    Referred By: [email or web address]   SELF           Confirmed By:Tyrone Whitt MD                                     EKG 12-lead (Final result)  Result time 11/07/23 14:25:01      Final result by Interface, Lab In [facility] (11/07/23 14:25:01)                   Narrative:    Test Reason : Z00.8,    Vent. Rate : 077 BPM     Atrial Rate : 077 BPM     P-R Int : 146 ms          QRS Dur : 086 ms      QT Int : 360 ms       P-R-T Axes : 033 074 036 degrees     QTc Int : 407 ms    Normal sinus rhythm  Normal ECG  When compared with ECG of 18-MAY-2023 14:08,  No significant change was found  Confirmed by Tyrone Whitt MD ([id number]) on 11/7/2023 2:24:49 PM    Referred By: [email or web address]   SELF            "Confirmed By:Tyrone hWitt MD                                  Imaging Results              CT Head Without Contrast (Final result)  Result time 11/07/23 14:29:37      Final result by Arslan Case MD (11/07/23 14:29:37)                   Impression:      No evidence of acute intracranial pathology.    Electronically signed by resident: Angelica Kilpatrick  Date:    11/07/2023  Time:    14:08    Electronically signed by: Arslan Case  Date:    11/07/2023  Time:    14:29               Narrative:    EXAMINATION:  CT HEAD WITHOUT CONTRAST    CLINICAL HISTORY:  Memory loss;New panic attacks, history of seizures and encephalitis.;    TECHNIQUE:  Low dose axial CT images obtained throughout the head without the use of intravenous contrast.  Axial, sagittal and coronal reconstructions were performed.    COMPARISON:  CT head 05/18/2023    FINDINGS:  Intracranial compartment:    Ventricles and sulci are normal in size for age without evidence of hydrocephalus.    The brain parenchyma appears within normal limits.  No evidence of acute intraparenchymal hemorrhage, edema or mass effect.  No evidence of acute major vascular distribution infarct.    No extra-axial blood or fluid collections.    Skull/extracranial contents (limited evaluation):    No displaced calvarial fracture.    Minimal right sphenoid mucosal thickening.  The mastoid air cells are clear.                                       Medications   diazePAM tablet 5 mg (5 mg Oral Given 11/7/23 1146)     Medical Decision Making  43M with history of seizures, encephalopathy, anxiety who presents with suspected panic attacks. Initially vitals are stable and afebrile. No evidence of seizure activity. Patient describing ongoing panic attacks over the last 4 days with episodes of bilateral arm and leg numbness, chest tightness (during episodes only), difficulty taking a "steady breath" and paranoia. Denies SI/HI. Patient responded well to oral valium.     Differentials: panic " attacks, anxiety, psychosis, unlikely seizure.    Patient discharged in HDS condition home with wife and with strict return precautions as discussed at bedside with 5 days of oral valium and psychiatry referral.    Amount and/or Complexity of Data Reviewed  Independent Historian: spouse  External Data Reviewed: notes.     Details: Prior discharge summary from 5/24/23 which discussed likely autoimmune etiology of known encephalitis.  Labs: ordered.     Details: CBC without leukocytosis or anemia  CMP with mild hypokalemia to 3.3 but otherwise unremarkable  TSH mildly low to 0.399 but grossly normal   fT4 normal  UA grossly unremarkable  UDS indicates THC  Urine creatinine mildly elevated to 440  TSH  Ethanol  Acetaminophen level  Radiology: ordered and independent interpretation performed.     Details: CT head without contrast with no acute intracranial process, no evidence of active bleeding or acute ischemic changes.  ECG/medicine tests: ordered and independent interpretation performed.     Details: EKG reveals NSR without evidence of acute ischemic changes or arrhythmias. Qtc 407.    Risk  Prescription drug management.               ED Course as of 11/07/23 1459   Tue Nov 07, 2023   1434 Vitals stable during ED visit. Labs grossly unremarkable. CBC, CMP grossly unremarkable. Urinalysis grossly normal. UDS positive for THC. Patient reports feeling much better after receiving valium 5mg po x 1 dose. CT head without contrast unremarkable and without evidence of acute intracranial pathology. Plan to discharge with PCP, neurology and psychiatry follow up. [SG]      ED Course User Index  [SG] Stuart Mcbride MD                    Clinical Impression:   Final diagnoses:  [F41.0] Panic attack (Primary)  [F41.9] Anxiety     ED Disposition Condition    Discharge Stable          ED Prescriptions       Medication Sig Dispense Start Date End Date Auth. Provider    diazePAM (VALIUM) 5 MG tablet Take 1 tablet (5 mg total) by mouth  once daily. for 5 days 5 tablet 11/7/2023 11/12/2023 Stuart Mcbride MD          Follow-up Information    None          Stuart Mcbride MD  Resident  11/07/23 2105

## 2023-11-09 ENCOUNTER — OFFICE VISIT (OUTPATIENT)
Dept: PRIMARY CARE CLINIC | Facility: CLINIC | Age: 43
End: 2023-11-09
Payer: COMMERCIAL

## 2023-11-09 ENCOUNTER — LAB VISIT (OUTPATIENT)
Dept: LAB | Facility: HOSPITAL | Age: 43
End: 2023-11-09
Attending: STUDENT IN AN ORGANIZED HEALTH CARE EDUCATION/TRAINING PROGRAM
Payer: COMMERCIAL

## 2023-11-09 VITALS
HEART RATE: 93 BPM | DIASTOLIC BLOOD PRESSURE: 84 MMHG | WEIGHT: 186.75 LBS | OXYGEN SATURATION: 98 % | TEMPERATURE: 99 F | BODY MASS INDEX: 26.74 KG/M2 | HEIGHT: 70 IN | SYSTOLIC BLOOD PRESSURE: 122 MMHG

## 2023-11-09 DIAGNOSIS — F43.23 ADJUSTMENT DISORDER WITH MIXED ANXIETY AND DEPRESSED MOOD: Primary | ICD-10-CM

## 2023-11-09 DIAGNOSIS — R56.9 SEIZURE: ICD-10-CM

## 2023-11-09 DIAGNOSIS — G04.81 AUTOIMMUNE ENCEPHALITIS: ICD-10-CM

## 2023-11-09 DIAGNOSIS — F41.0 PANIC ATTACK: ICD-10-CM

## 2023-11-09 PROCEDURE — 3079F DIAST BP 80-89 MM HG: CPT | Mod: CPTII,S$GLB,, | Performed by: STUDENT IN AN ORGANIZED HEALTH CARE EDUCATION/TRAINING PROGRAM

## 2023-11-09 PROCEDURE — 1159F MED LIST DOCD IN RCRD: CPT | Mod: CPTII,S$GLB,, | Performed by: STUDENT IN AN ORGANIZED HEALTH CARE EDUCATION/TRAINING PROGRAM

## 2023-11-09 PROCEDURE — 99214 PR OFFICE/OUTPT VISIT, EST, LEVL IV, 30-39 MIN: ICD-10-PCS | Mod: S$GLB,,, | Performed by: STUDENT IN AN ORGANIZED HEALTH CARE EDUCATION/TRAINING PROGRAM

## 2023-11-09 PROCEDURE — 3074F PR MOST RECENT SYSTOLIC BLOOD PRESSURE < 130 MM HG: ICD-10-PCS | Mod: CPTII,S$GLB,, | Performed by: STUDENT IN AN ORGANIZED HEALTH CARE EDUCATION/TRAINING PROGRAM

## 2023-11-09 PROCEDURE — 36415 COLL VENOUS BLD VENIPUNCTURE: CPT | Mod: PN | Performed by: STUDENT IN AN ORGANIZED HEALTH CARE EDUCATION/TRAINING PROGRAM

## 2023-11-09 PROCEDURE — 3008F BODY MASS INDEX DOCD: CPT | Mod: CPTII,S$GLB,, | Performed by: STUDENT IN AN ORGANIZED HEALTH CARE EDUCATION/TRAINING PROGRAM

## 2023-11-09 PROCEDURE — 99214 OFFICE O/P EST MOD 30 MIN: CPT | Mod: S$GLB,,, | Performed by: STUDENT IN AN ORGANIZED HEALTH CARE EDUCATION/TRAINING PROGRAM

## 2023-11-09 PROCEDURE — 99999 PR PBB SHADOW E&M-EST. PATIENT-LVL III: CPT | Mod: PBBFAC,,, | Performed by: STUDENT IN AN ORGANIZED HEALTH CARE EDUCATION/TRAINING PROGRAM

## 2023-11-09 PROCEDURE — 99999 PR PBB SHADOW E&M-EST. PATIENT-LVL III: ICD-10-PCS | Mod: PBBFAC,,, | Performed by: STUDENT IN AN ORGANIZED HEALTH CARE EDUCATION/TRAINING PROGRAM

## 2023-11-09 PROCEDURE — 80177 DRUG SCRN QUAN LEVETIRACETAM: CPT | Performed by: STUDENT IN AN ORGANIZED HEALTH CARE EDUCATION/TRAINING PROGRAM

## 2023-11-09 PROCEDURE — 3074F SYST BP LT 130 MM HG: CPT | Mod: CPTII,S$GLB,, | Performed by: STUDENT IN AN ORGANIZED HEALTH CARE EDUCATION/TRAINING PROGRAM

## 2023-11-09 PROCEDURE — 3008F PR BODY MASS INDEX (BMI) DOCUMENTED: ICD-10-PCS | Mod: CPTII,S$GLB,, | Performed by: STUDENT IN AN ORGANIZED HEALTH CARE EDUCATION/TRAINING PROGRAM

## 2023-11-09 PROCEDURE — 1159F PR MEDICATION LIST DOCUMENTED IN MEDICAL RECORD: ICD-10-PCS | Mod: CPTII,S$GLB,, | Performed by: STUDENT IN AN ORGANIZED HEALTH CARE EDUCATION/TRAINING PROGRAM

## 2023-11-09 PROCEDURE — 3079F PR MOST RECENT DIASTOLIC BLOOD PRESSURE 80-89 MM HG: ICD-10-PCS | Mod: CPTII,S$GLB,, | Performed by: STUDENT IN AN ORGANIZED HEALTH CARE EDUCATION/TRAINING PROGRAM

## 2023-11-09 RX ORDER — BUSPIRONE HYDROCHLORIDE 5 MG/1
5 TABLET ORAL DAILY
COMMUNITY
Start: 2023-11-02 | End: 2024-02-26

## 2023-11-09 RX ORDER — HYDROXYZINE HYDROCHLORIDE 25 MG/1
25 TABLET, FILM COATED ORAL DAILY
COMMUNITY
Start: 2023-09-05 | End: 2023-11-09 | Stop reason: ALTCHOICE

## 2023-11-09 RX ORDER — ESCITALOPRAM OXALATE 20 MG/1
20 TABLET ORAL NIGHTLY
COMMUNITY
Start: 2023-11-02 | End: 2023-11-29 | Stop reason: SDUPTHER

## 2023-11-09 NOTE — PROGRESS NOTES
"Primary Care  Office Visit - In Person  11/9/2023      HPI    Patient is a 43 y.o.   Jayesh Rose  has a past medical history of Anxiety (5/18/2023), Class 1 obesity in adult (5/18/2023), GERD (gastroesophageal reflux disease) (1/10/2019), and Seizures.    Patient presenting for ER f/u     He was hospitalized in May for autoimmune encephalitis     Patient has Hx of seizures - last in 2020   Current regimen Keppra 1500 mg b.i.d.     He resumed work two weeks ago and began to experience disorientation, memory loss, and anxiety  Antidepressants were switched from Zoloft to Lexapro by an online service 7 days ago.  Patient had previously been on Zoloft for several years  The change did not improve symptoms  Patient reports work has caused significant stress due to work hours  He reports occasional use of marijuana  He states that he is comfortable continuing to work    Active Medications:  Current Outpatient Medications   Medication Instructions    busPIRone (BUSPAR) 5 mg, Oral, Daily    diazePAM (VALIUM) 5 mg, Oral, Daily    EScitalopram oxalate (LEXAPRO) 20 mg, Oral, Nightly    guaifenesin (MUCINEX ORAL) 1 tablet, Oral, 2 times daily PRN    ibuprofen (ADVIL,MOTRIN) 400-800 mg, Oral, 2 times daily PRN    levETIRAcetam (KEPPRA) 1,500 mg, Oral, Every 12 hours    multivitamin (THERAGRAN) per tablet 1 tablet, Oral, Daily    omeprazole (PRILOSEC OTC) 20 mg, Oral, Daily PRN       Vitals:    11/09/23 1059   BP: 122/84   BP Location: Right arm   Pulse: 93   Temp: 99 °F (37.2 °C)   SpO2: 98%   Weight: 84.7 kg (186 lb 11.7 oz)   Height: 5' 10" (1.778 m)       Physical Exam  Neurological:      General: No focal deficit present.      Mental Status: He is alert and oriented to person, place, and time.      Cranial Nerves: Cranial nerves 2-12 are intact.      Motor: Motor function is intact.      Coordination: Heel to Shin Test abnormal. Finger-Nose-Finger Test normal.      Gait: Gait is intact.   Psychiatric:         " Attention and Perception: Attention normal.         Mood and Affect: Mood normal.         Speech: Speech normal.         Behavior: Behavior normal.         Thought Content: Thought content normal.         Cognition and Memory: Cognition normal.          Assessment and Plan     1. Adjustment disorder with mixed anxiety and depressed mood  Comments:  Continue Lexapro 20 mg daily  Patient to f/u w psychiatry for medication management    2. Panic attack    3. Seizure  -     Levetiracetam level; Future; Expected date: 11/09/2023    4. Autoimmune encephalitis  Comments:  No significant findings on CT in ED  Patient to follow up  w neurology                   Upcoming Scheduled Appointments and Follow Up:    Future Appointments   Date Time Provider Department Center   12/20/2023  3:30 PM Te Garcia MD [facility] NEURO [facility]   2/16/2024  2:30 PM Mark Marcos MD [facility]       Follow Up [facility] Care (with who? when?): No follow-ups on file.      Extended Emergency Contact Information  Primary Emergency Contact: Lakesha Rose  Mobile Phone: [phone]  Relation: Spouse  Preferred language: English   needed? No      Mark Marcos MD   Internal Medicine  11/9/2023 - 11:27 AM    I spent a total of 60 minutes on the day of the visit.This includes face to face time and non-face to face time preparing to see the patient (eg, review of tests), obtaining and/or reviewing separately obtained history, documenting clinical information in the electronic or other health record, independently interpreting results and communicating results to the patient/family/caregiver, or care coordinator.    While patients have the right to access their medical record, it is essential to recognize that progress notes primarily serve as a means of communication among healthcare professionals.

## 2023-11-13 ENCOUNTER — PATIENT MESSAGE (OUTPATIENT)
Dept: PRIMARY CARE CLINIC | Facility: CLINIC | Age: 43
End: 2023-11-13
Payer: COMMERCIAL

## 2023-11-13 LAB — LEVETIRACETAM SERPL-MCNC: 21.5 UG/ML (ref 3–60)

## 2023-11-29 ENCOUNTER — PATIENT MESSAGE (OUTPATIENT)
Dept: PRIMARY CARE CLINIC | Facility: CLINIC | Age: 43
End: 2023-11-29
Payer: COMMERCIAL

## 2023-11-29 RX ORDER — ESCITALOPRAM OXALATE 20 MG/1
20 TABLET ORAL NIGHTLY
Qty: 30 TABLET | Refills: 1 | Status: SHIPPED | OUTPATIENT
Start: 2023-11-29 | End: 2024-01-29

## 2023-11-29 NOTE — TELEPHONE ENCOUNTER
No care due was identified.  [facility] Embedded Care Due Messages. Reference number: [id number].   11/29/2023 8:23:20 AM CST

## 2023-12-12 ENCOUNTER — PATIENT MESSAGE (OUTPATIENT)
Dept: NEUROLOGY | Facility: CLINIC | Age: 43
End: 2023-12-12
Payer: COMMERCIAL

## 2023-12-20 ENCOUNTER — OFFICE VISIT (OUTPATIENT)
Dept: NEUROLOGY | Facility: CLINIC | Age: 43
End: 2023-12-20
Payer: COMMERCIAL

## 2023-12-20 VITALS — SYSTOLIC BLOOD PRESSURE: 149 MMHG | DIASTOLIC BLOOD PRESSURE: 95 MMHG | HEART RATE: 81 BPM

## 2023-12-20 DIAGNOSIS — G04.81 AUTOIMMUNE ENCEPHALITIS: Primary | ICD-10-CM

## 2023-12-20 PROCEDURE — 99999 PR PBB SHADOW E&M-EST. PATIENT-LVL III: ICD-10-PCS | Mod: PBBFAC,,, | Performed by: STUDENT IN AN ORGANIZED HEALTH CARE EDUCATION/TRAINING PROGRAM

## 2023-12-20 PROCEDURE — 99215 PR OFFICE/OUTPT VISIT, EST, LEVL V, 40-54 MIN: ICD-10-PCS | Mod: S$GLB,,, | Performed by: STUDENT IN AN ORGANIZED HEALTH CARE EDUCATION/TRAINING PROGRAM

## 2023-12-20 PROCEDURE — 3077F SYST BP >= 140 MM HG: CPT | Mod: CPTII,S$GLB,, | Performed by: STUDENT IN AN ORGANIZED HEALTH CARE EDUCATION/TRAINING PROGRAM

## 2023-12-20 PROCEDURE — 3077F PR MOST RECENT SYSTOLIC BLOOD PRESSURE >= 140 MM HG: ICD-10-PCS | Mod: CPTII,S$GLB,, | Performed by: STUDENT IN AN ORGANIZED HEALTH CARE EDUCATION/TRAINING PROGRAM

## 2023-12-20 PROCEDURE — 3080F PR MOST RECENT DIASTOLIC BLOOD PRESSURE >= 90 MM HG: ICD-10-PCS | Mod: CPTII,S$GLB,, | Performed by: STUDENT IN AN ORGANIZED HEALTH CARE EDUCATION/TRAINING PROGRAM

## 2023-12-20 PROCEDURE — 99215 OFFICE O/P EST HI 40 MIN: CPT | Mod: S$GLB,,, | Performed by: STUDENT IN AN ORGANIZED HEALTH CARE EDUCATION/TRAINING PROGRAM

## 2023-12-20 PROCEDURE — 1159F PR MEDICATION LIST DOCUMENTED IN MEDICAL RECORD: ICD-10-PCS | Mod: CPTII,S$GLB,, | Performed by: STUDENT IN AN ORGANIZED HEALTH CARE EDUCATION/TRAINING PROGRAM

## 2023-12-20 PROCEDURE — 99999 PR PBB SHADOW E&M-EST. PATIENT-LVL III: CPT | Mod: PBBFAC,,, | Performed by: STUDENT IN AN ORGANIZED HEALTH CARE EDUCATION/TRAINING PROGRAM

## 2023-12-20 PROCEDURE — G2211 COMPLEX E/M VISIT ADD ON: HCPCS | Mod: S$GLB,,, | Performed by: STUDENT IN AN ORGANIZED HEALTH CARE EDUCATION/TRAINING PROGRAM

## 2023-12-20 PROCEDURE — 3080F DIAST BP >= 90 MM HG: CPT | Mod: CPTII,S$GLB,, | Performed by: STUDENT IN AN ORGANIZED HEALTH CARE EDUCATION/TRAINING PROGRAM

## 2023-12-20 PROCEDURE — 1159F MED LIST DOCD IN RCRD: CPT | Mod: CPTII,S$GLB,, | Performed by: STUDENT IN AN ORGANIZED HEALTH CARE EDUCATION/TRAINING PROGRAM

## 2023-12-20 PROCEDURE — G2211 PR OFFICE/OUTPT VISIT, NEW/EST, COMPLEX, ADD ON: ICD-10-PCS | Mod: S$GLB,,, | Performed by: STUDENT IN AN ORGANIZED HEALTH CARE EDUCATION/TRAINING PROGRAM

## 2023-12-20 NOTE — PROGRESS NOTES
"[facility] - NEUROLOGY 7TH [place] [facility], [place]    Date: 12/20/23  Patient Name: Jayesh Rose   MRN: [id number]   PCP: Du Shaw  Referring Provider: No ref. provider found    Assessment:   Jayesh Rose is a 43 y.o. male presenting for encephalitis follow up. He has been doing better per wife and himself. He stated he is able to function now by himself with the help of list. He recently lost his job, applied to disability, and is looking for a new job. We will order a repeat MRI brain W contrast and repeat the autoimmune serum work up. We will also reach out to Dr Wilkins to get updated neuropsychological testing. The patient and wife verbalized understanding.     Plan:     - Follow up with neuropsychology  - Repeat MRI brain w contrast   - Serum autoimmune labs   - Continue keppra 750 BID  - Follow up depending on results.     Problem List Items Addressed This Visit          ID    Autoimmune encephalitis - Primary    Relevant Orders    MRI Brain W WO Contrast    Encephalopathy Autoimmune Eval    Ambulatory referral/consult to Neuropsychology     Te Garcia MD    Subjective:   Patient seen in consultation at the request of No ref. provider found for the evaluation of encephalitis. A copy of this note will be sent to the referring physician.      Interval history 12/20/23:   Mr. Jayesh Rose is a 43 y.o. male presenting for encephalitis follow up. Since last seen in clinic, we repeated an MRI (7/14) which showed improvement of his lesion. He saw neuropsychology as well: "Results predominantly showed significant impairment with memory (verbal and visual but visual was somewhat better). Also, findings noted increased trouble with visual-spatial skills (nonverbal reasoning, visual perception) and  increased variability with attention/working memory. Otherwise, scores were largely within expectations for verbal skills, most executive functions, " "mental speed, basic attention, and language measures." Since then he has noticed significant improvement in his memory (its been 6 months). He started having episodes of panic attacks which started after starting a SSRI but later improved after changing to a different SSRI. He did speech therapy with mild improvement. He is here by himself, wife helped with history taking over the phone.     HPI 6/14/23:   Mr. Jayesh Rose is a 42 y.o. male presenting hospital follow up for encephalitis. He has a history of seizures and anxiety. 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 He is here for hospital follow up of presumptive autoimmune encephalitis. He presented to [facility] [place] [facility] around tucker ago after a GTC. In the ER him and his wife stated that he was also having some confusion and myalgias. WBC was elevated and there was some questionable neck pain. CT head unremarkable. LP done due to concerns for meningitis. CSF studies non specific so neurology was consulted. MRI brain w wo showed diffusion restriction and T2 flair hyper intensities on bilateral temporal lobes. Repeat LP ruled out infection and he was started on IVIG. He completed 5 days with mild subjective improvement while inpatient and was dc on day 5. Since then, there has been mild-mod improvement. Wife reached out to me last week and after discussing with Dr Berumen, we prescribed 3 days of high dose steroids. He is on day 2. Mild improvement noted     PAST MEDICAL HISTORY:  Past Medical History:   Diagnosis Date    Anxiety 5/18/2023    Class 1 obesity in adult 5/18/2023    GERD (gastroesophageal reflux disease) 1/10/2019    Seizures        PAST SURGICAL HISTORY:  No past surgical history on file.    CURRENT MEDS:  Current Outpatient Medications   Medication Sig Dispense Refill    busPIRone (BUSPAR) 5 MG Tab Take 5 mg by mouth once " daily.      EScitalopram oxalate (LEXAPRO) 20 MG tablet Take 1 tablet (20 mg total) by mouth every evening. 30 tablet 1    guaifenesin (MUCINEX ORAL) Take 1 tablet by mouth 2 (two) times daily as needed (allergies).      ibuprofen (ADVIL,MOTRIN) 200 MG tablet Take 400-800 mg by mouth 2 (two) times daily as needed for Pain.      multivitamin (THERAGRAN) per tablet Take 1 tablet by mouth once daily.      omeprazole (PRILOSEC OTC) 20 MG tablet Take 20 mg by mouth daily as needed (heartburn).      diazePAM (VALIUM) 5 MG tablet Take 1 tablet (5 mg total) by mouth once daily. for 5 days 5 tablet 0    levETIRAcetam (KEPPRA) 750 MG Tab Take 2 tablets (1,500 mg total) by mouth every 12 (twelve) hours. 120 tablet 2     No current facility-administered medications for this visit.       ALLERGIES:  Review of patient's allergies indicates:  No Known Allergies    FAMILY HISTORY:  Family History   Problem Relation Age of Onset    Mental illness Mother     Heart disease Father     Mental illness Father     Heart disease Brother     Cancer Maternal Grandfather     Cancer Paternal Grandfather        SOCIAL HISTORY:  Social History     Tobacco Use    Smoking status: Never     Passive exposure: Never    Smokeless tobacco: Never   Substance Use Topics    Drug use: No       Review of Systems:  12 system review of systems is negative except for the symptoms mentioned in HPI.      Objective:     Vitals:    12/20/23 1526   BP: (!) 149/95   Pulse: 81     General: NAD, well nourished   Eyes: no tearing, discharge, no erythema   ENT: moist mucous membranes of the oral cavity, nares patent    Neck: Supple, full range of motion  Cardiovascular: Warm and well perfused, pulses equal and symmetrical  Lungs: Normal work of breathing, normal chest wall excursions  Skin: No rash, lesions, or breakdown on exposed skin  Psychiatry: Mood and affect are appropriate   Abdomen: soft, non tender, non distended  Extremeties: No cyanosis, clubbing or  edema.    Neurological   MENTAL STATUS: Alert and oriented to person, place, and time. Attention and concentration within normal limits. Speech without dysarthria, able to name and repeat without difficulty. Remote memory and recent memory impaired (recalled 1 word with no cue, 2 with cues).   CRANIAL NERVES: Visual fields intact. PERRL. EOMI. Facial sensation intact. Face symmetrical. Hearing grossly intact. Full shoulder shrug bilaterally. Tongue protrudes midline   SENSORY: Sensation is intact to pin throughout.  Joint position perception intact. Negative Romberg.   MOTOR: Normal bulk and tone. No pronator drift.  5/5 deltoid, biceps, triceps, interosseous, hand  bilaterally. 5/5 iliopsoas, knee extension/flexion, foot dorsi/plantarflexion bilaterally.    REFLEXES: Symmetric and 3+ throughout. Toes down going bilaterally. Bilateral campbell   CEREBELLAR/COORDINATION/GAIT: Gait steady with normal arm swing and stride length.  Heel to shin intact. Finger to nose intact. Normal rapid alternating movements.

## 2023-12-27 ENCOUNTER — PATIENT MESSAGE (OUTPATIENT)
Dept: NEUROLOGY | Facility: CLINIC | Age: 43
End: 2023-12-27
Payer: COMMERCIAL

## 2024-01-04 ENCOUNTER — PATIENT MESSAGE (OUTPATIENT)
Dept: NEUROLOGY | Facility: CLINIC | Age: 44
End: 2024-01-04
Payer: COMMERCIAL

## 2024-01-09 ENCOUNTER — PATIENT MESSAGE (OUTPATIENT)
Dept: NEUROLOGY | Facility: CLINIC | Age: 44
End: 2024-01-09
Payer: COMMERCIAL

## 2024-01-10 DIAGNOSIS — F06.70 MILD NEUROCOGNITIVE DISORDER DUE TO ANOTHER MEDICAL CONDITION: Primary | ICD-10-CM

## 2024-01-10 RX ORDER — AMANTADINE HYDROCHLORIDE 100 MG/1
100 TABLET ORAL DAILY
Qty: 30 TABLET | Refills: 11 | Status: SHIPPED | OUTPATIENT
Start: 2024-01-10 | End: 2025-01-09

## 2024-01-28 NOTE — TELEPHONE ENCOUNTER
No care due was identified.  Health [facility] Embedded Care Due Messages. Reference number: [id number]. 1/28/2024   9:18:28 AM CST

## 2024-01-29 RX ORDER — ESCITALOPRAM OXALATE 20 MG/1
20 TABLET ORAL NIGHTLY
Qty: 30 TABLET | Refills: 1 | Status: SHIPPED | OUTPATIENT
Start: 2024-01-29 | End: 2024-02-26 | Stop reason: ALTCHOICE

## 2024-02-02 ENCOUNTER — PATIENT OUTREACH (OUTPATIENT)
Dept: ADMINISTRATIVE | Facility: HOSPITAL | Age: 44
End: 2024-02-02
Payer: COMMERCIAL

## 2024-02-02 NOTE — PROGRESS NOTES
Health Maintenance Due   Topic Date Due    Hepatitis C Screening  Never done    Hemoglobin A1c (Diabetic Prevention Screening)  01/16/2022    Influenza Vaccine (1) 09/01/2023    COVID-19 Vaccine (4 - 2023-24 season) 09/01/2023    Lipid Panel  01/16/2024        Chart review done.    updated.   Immunizations reviewed & updated.   Care Everywhere updated.

## 2024-02-11 ENCOUNTER — PATIENT MESSAGE (OUTPATIENT)
Dept: NEUROLOGY | Facility: CLINIC | Age: 44
End: 2024-02-11
Payer: COMMERCIAL

## 2024-02-20 DIAGNOSIS — G04.81 AUTOIMMUNE ENCEPHALITIS: Primary | ICD-10-CM

## 2024-02-20 RX ORDER — MODAFINIL 100 MG/1
100 TABLET ORAL DAILY
Qty: 30 TABLET | Refills: 0 | Status: SHIPPED | OUTPATIENT
Start: 2024-02-20 | End: 2024-02-26

## 2024-02-26 ENCOUNTER — OFFICE VISIT (OUTPATIENT)
Dept: PSYCHIATRY | Facility: CLINIC | Age: 44
End: 2024-02-26
Payer: COMMERCIAL

## 2024-02-26 VITALS
HEIGHT: 70 IN | WEIGHT: 190.13 LBS | DIASTOLIC BLOOD PRESSURE: 87 MMHG | BODY MASS INDEX: 27.22 KG/M2 | HEART RATE: 92 BPM | SYSTOLIC BLOOD PRESSURE: 133 MMHG

## 2024-02-26 DIAGNOSIS — F41.0 PANIC ATTACK: ICD-10-CM

## 2024-02-26 DIAGNOSIS — F41.9 ANXIETY: ICD-10-CM

## 2024-02-26 PROCEDURE — 1160F RVW MEDS BY RX/DR IN RCRD: CPT | Mod: CPTII,S$GLB,,

## 2024-02-26 PROCEDURE — 90792 PSYCH DIAG EVAL W/MED SRVCS: CPT | Mod: S$GLB,,,

## 2024-02-26 PROCEDURE — 99999 PR PBB SHADOW E&M-EST. PATIENT-LVL III: CPT | Mod: PBBFAC,,,

## 2024-02-26 PROCEDURE — 1159F MED LIST DOCD IN RCRD: CPT | Mod: CPTII,S$GLB,,

## 2024-02-26 PROCEDURE — 3075F SYST BP GE 130 - 139MM HG: CPT | Mod: CPTII,S$GLB,,

## 2024-02-26 PROCEDURE — 3079F DIAST BP 80-89 MM HG: CPT | Mod: CPTII,S$GLB,,

## 2024-02-26 RX ORDER — PAROXETINE HYDROCHLORIDE 20 MG/1
20 TABLET, FILM COATED ORAL EVERY MORNING
Qty: 30 TABLET | Refills: 1 | Status: SHIPPED | OUTPATIENT
Start: 2024-02-26 | End: 2024-05-14 | Stop reason: SDUPTHER

## 2024-02-26 RX ORDER — TRAZODONE HYDROCHLORIDE 50 MG/1
50 TABLET ORAL NIGHTLY
Qty: 30 TABLET | Refills: 1 | Status: SHIPPED | OUTPATIENT
Start: 2024-02-26 | End: 2024-05-29

## 2024-02-26 NOTE — PROGRESS NOTES
"Outpatient Psychiatry Initial Visit (MD/NP)    2/26/2024    Jayesh Rose, a 43 y.o. male, presenting for initial evaluation visit. Met with patient.    Reason for Encounter: Referral from Stuart Mcbride MD. Patient complains of "memory problems".    History of Present Illness: Patient presents alert, casually dressed and groomed. He appears anxious. Patient sates "my wife typed a note so I didn't forget anything" and hands writer a typed note. He also has a small pocket sized notebook he states he wrote things things down so he did not forget things he wanted to discuss during the evaluation today. Typed note states patient is not on keppra and last neurologist note states to continue keprra. Patient unable to elaborate and suggested calling his wife. The patients's wife was called for collateral.     Patient's wife Lakesha, reports in 2020 her  was diagnosed with seizures and he was prescribed Keppra.     Patient reports he was texting his wife things that didn't make since and when she arrived home she found her  having a seizure. Wife states in May of 2023 her  had a seizure despite being on keppra and he was hospitalized and diagnosed with auto immune encephalitis. She reports that bilateral temporal lobes and his memory has been affected.    Wife reports he continued on Keppra until he ran out of his prescription in December 2023. She states due to holidays, and back and forth communication with his provider through the portal, he had been off his medication for 2 weeks and he did not have any seizures. Patient's wife states this prompted his neurologist to believe that her  had temporal lobe epilepsy and that his seizure activity that began in 2020 was possibly prodromal to his encephalitis and discontinued keppra.     Patient reports he is also experiencing severe anxiety and panic attacks that he attributes to his decreased memory.     Of note, his wife wrote that beginning in " "October her  has been experiencing "severe panic attacks; fear; can't be alone, shaking/puking". Patient and his wife were concerned that these symptoms might be related to encephalitis and his neurologist believed it to be anxiety.     Early in November patient went to the ED for "bad anxiety" his "CT was clear" and he was given valium.     Patient is currently on lexapro 20 mg. Wife reports that he was previously on 100 mg of zolot for depression and an online psychiatrist suggested the switch to lexapro to target anxiety. Wife reports that this "seemed to help for a while but his anxiety resurfaced in late February and seems to be exacerbated by stress".     Wife reports that his neurologist has "finally prescribed a stimulant (modafinil 100 mg) to help with focus/brain fog although he was hesitant due to potential side effects".     Wife reports "neurologist trialed a couple weeks of amantadine but this seemed to make his memory worse".     Patient reports he experiences increased anxiety in the mornings as he attempts to remember things from the day before and think about plans for the day.     States he experiences panic attacks at night and he reports he can not be left alone.     Patient reports he was fired from his employment as a  for [employer] due to his memory problems in December 2023.    Patient reports a history of depression and was being treated by a psychiatrist and eventually his zoloft was being prescribed by his PCP. Patient was last seen by a psychiatric provider via telehealth in October who changed his medication to lexapro to target his anxiety. Patient's wife explained they wanted a psychiatric provider associated with [facility] to evaluate and treat him going forward to evaluate and treat his anxiety and panic attacks.     Endorses consistent depression symptoms over the past 2 weeks including decreased " "interest/motivation/pleasure/energy/appetite/concentration/sleep.  States overall his sleep is "not good". Reports difficulty with initiating and remaining asleep. Reports he gets 5-6 hours of sleep a night. Endorses difficulty with his interests and motivation. Currently reports decreased motivation. Endorses feelings of guilt related to his mothers recent death and their relationship. Overall his energy level is decreased. His ability to concentrate is decreased. Reports his appetite is pretty good and he reports he is on ozempic. Denies significant fluctuations in weight. Reports he lost approx. 20 pounds since May 2023. Reports he lost a lot of weight while he was hospialized and this was a jump start because he was over weight. Endorses psychomotor agitation.   Denies suicide/homicide ideations. Denies A/V/H.      Patient and his wife was counseld on contraindications of stimulant side effects with history of seizures and anxiety.    Psychiatric History:    Denies any history of psychotic symptoms, including AVH, paranoia, thought insertion/broadcasting/withdrawal, delusions.     Endorses LUCI symptoms including excess worry, tension, insomnia. Endorses panic attacks.     Denies history of PTSD symptoms including flashbacks, nightmares, hypervigilance. Since May 2023    Denies OCD and eating disorder symptoms.    Psychiatric Hospitalizations:Denies     Head Trauma with loss of consciousness:Denies    History of Seizures:Endorses    Substance Use alcohol/illicit:Current social alcohol and recent and past THC. Patient states he recently quit using THC due to exacerbation of his symptoms of anxiety    Rehab:Denies    Access to a Gun:Denies    Trauma History (physical, emotional, sexual):Physical and emotional    Suicide attempts/means:Denies     Out patient psychotherapy:Endorses; last provider telehealth in October 2023.    Review Of Systems:     Review of Systems   Constitutional:  Negative for fever.   HENT:  " "Negative for sore throat.    Eyes:  Negative for pain.   Respiratory:  Negative for shortness of breath.    Cardiovascular:  Negative for chest pain and palpitations.   Gastrointestinal:  Positive for heartburn and vomiting. Negative for abdominal pain.        Reports dry heaving and vomiting with panic attacks.    Musculoskeletal:  Positive for back pain. Negative for myalgias.   Skin:  Negative for rash.   Neurological:  Positive for tingling and seizures. Negative for dizziness, weakness and headaches.        Numbness to hands, lips, and feet prodromal to panic attacks.    Psychiatric/Behavioral:  Positive for depression. The patient is nervous/anxious and has insomnia.         Patient positive for hyperhidrosis.         Current Evaluation:     Nutritional Screening: Considering the patient's height and weight, medications, medical history and preferences, should a referral be made to the dietitian? no    Constitutional  Vitals:  Most recent vital signs, dated less than 90 days prior to this appointment, were reviewed.    Vitals:    02/26/24 1258   BP: 133/87   Pulse: 92   Weight: 86.3 kg (190 lb 2.4 oz)   Height: 5' 10" (1.778 m)        General:  unremarkable, age appropriate, casually dressed, neatly groomed     Musculoskeletal  Muscle Strength/Tone:  not examined   Gait & Station:  non-ataxic     Psychiatric  Speech:  no latency; no press   Mood & Affect:  "Flat"  mood-incongruent, anxious   Thought Process:  normal and logical   Associations:  intact   Thought Content:  normal, no suicidality, no homicidality, delusions, or paranoia   Insight:  intact, has awareness of illness   Judgement: behavior is adequate to circumstances   Orientation:  grossly intact, person, place, situation, month of year, year   Memory: intact for content of interview, memory >3 objects at five mins, able to remember recent events- Yes, able to remember remote events- Yes   Language: grossly intact, able to name, able to repeat "   Attention Span & Concentration:  able to focus   Fund of Knowledge:  intact and appropriate to age and level of education, 3 of 4 recent presidents       Relevant Elements of Neurological Exam: normal gait    Functioning in Relationships:  Spouse/partner: Wife  Peers: Endorses healthy support group  Employers: Unemployed    Laboratory Data  No visits with results within 1 Month(s) from this visit.   Latest known visit with results is:   Lab Visit on 11/09/2023   Component Date Value Ref Range Status    Levetiracetam Lvl 11/09/2023 21.5  3.0 - 60.0 ug/mL Final         Medications  Outpatient Encounter Medications as of 2/26/2024   Medication Sig Dispense Refill    amantadine  mg Tab Take 1 tablet (100 mg total) by mouth once daily. 30 tablet 11    busPIRone (BUSPAR) 5 MG Tab Take 5 mg by mouth once daily.      EScitalopram oxalate (LEXAPRO) 20 MG tablet TAKE 1 TABLET BY MOUTH EVERY DAY IN THE EVENING 30 tablet 1    guaifenesin (MUCINEX ORAL) Take 1 tablet by mouth 2 (two) times daily as needed (allergies).      ibuprofen (ADVIL,MOTRIN) 200 MG tablet Take 400-800 mg by mouth 2 (two) times daily as needed for Pain.      levETIRAcetam (KEPPRA) 750 MG Tab Take 2 tablets (1,500 mg total) by mouth every 12 (twelve) hours. 120 tablet 2    modafiniL (PROVIGIL) 100 MG Tab Take 1 tablet (100 mg total) by mouth once daily. 30 tablet 0    multivitamin (THERAGRAN) per tablet Take 1 tablet by mouth once daily.      omeprazole (PRILOSEC OTC) 20 MG tablet Take 20 mg by mouth daily as needed (heartburn).      [DISCONTINUED] EScitalopram oxalate (LEXAPRO) 20 MG tablet Take 1 tablet (20 mg total) by mouth every evening. 30 tablet 1     No facility-administered encounter medications on file as of 2/26/2024.       Psychotherapy:  Target symptoms: anxiety   Why chosen therapy is appropriate versus another modality: relevant to diagnosis  Outcome monitoring methods: self-report, observation  Therapeutic intervention type: insight  oriented psychotherapy, supportive psychotherapy  Topics discussed/themes: building skills sets for symptom management  The patient's response to the intervention is accepting. The patient's progress toward treatment goals is good.   Duration of intervention: 16 minutes.     Assessment - Diagnosis - Goals:     Impression: 43 y.o male with a self reported history of chronic depression, medical condition induced seizures. Incresing severity of S&S of anxiety and panic attacks not adequately controlled on current medication.       ICD-10-CM ICD-9-CM   1. Panic attack  F41.0 300.01   2. Anxiety  F41.9 300.00       Strengths and Liabilities: Strength: Patient accepts guidance/feedback, Strength: Patient is expressive/articulate., Strength: Patient is intelligent., Strength: Patient is motivated for change., Strength: Patient is physically healthy., Strength: Patient has positive support network., Liability: Patient lacks coping skills.    Treatment Goals:  Specify outcomes written in observable, behavioral terms:   Anxiety: modifying schemata of threat/vulnerability/need for control     Treatment Plan/Recommendations:   Provided information regarding diagnosis; reviewed medically reasonable alternatives for treatment; reviewed risks and benefits of each alternative and made reasonable effort to ascertain the individual understands the information.  Reviewed risks and benefits of medication; reviewed risks and benefits of not receiving the medication and made reasonable effort to ascertain the individual understands the information  Provided opportunity for individual to ask questions to gain a better understanding of the medical treatment in order to proceed or refuse and made reasonable effort to confirm the individual understands the information.    Stop lexapro  Stop modafinil  Start paroxetine (paxil) 20 MG; Take 1 tablet; by mouth; every morning; to target anxiety; refills 1  Start trazodone (desyrel) 50 MG; Take  1/2 tablet (25 MG total); by mouth; every night; to target depression and off label insomnia; If no improvement in sleep after a few nights, may take 1 tablet (50 MG total) refills 1  Counseled on Serotonin Syndrome sings and symptoms including disorientation, agitated delirium, startle easily, tachycardia, hyperthermia, hypertension, vomiting, diarrhea, tremor, hyperreflexia, muscle spasms, and muscle rigidity. Instructed to stop paxil and trazodone immediately and seek medical attention for any before mentioned S&S. Instructed client to take all medications as prescribed, do not stop taking them abruptly, unless he suspects Serotonin syndrome and call clinic for any issues/concerns.   Provided other tips that may help with treatment:  -Try to be active and exercise.  -Set realistic goals for yourself.  -Try to spend time with other people and confide in a trusted friend or relative.  -Try not to isolate yourself, and let others help you.  -Expect your mood to improve gradually, not immediately.  -Discuss decisions with others who know you well and have a more objective view of your situation.    Call or message provider for additional questions or concerns.   Referral to Brief Evidenced Based Psychotherapy    Safety Plan:   Patient has been educated on safety plan should any SI/HI, medication side effects &/or emergency arise. Patient verbalized understanding and agreement to contact a trusted friend or loved one, contact provider's office, call 911 or go to nearest ER should any emergency occur.       Return to Clinic: 1 month    VIRA Marshall

## 2024-04-02 DIAGNOSIS — F06.70 MILD NEUROCOGNITIVE DISORDER DUE TO ANOTHER MEDICAL CONDITION: Primary | ICD-10-CM

## 2024-04-02 RX ORDER — MODAFINIL 100 MG/1
100 TABLET ORAL DAILY
Qty: 30 TABLET | Refills: 3 | Status: SHIPPED | OUTPATIENT
Start: 2024-04-02 | End: 2024-07-31

## 2024-05-13 ENCOUNTER — PATIENT MESSAGE (OUTPATIENT)
Dept: PSYCHIATRY | Facility: CLINIC | Age: 44
End: 2024-05-13
Payer: COMMERCIAL

## 2024-05-15 RX ORDER — PAROXETINE HYDROCHLORIDE 20 MG/1
20 TABLET, FILM COATED ORAL EVERY MORNING
Qty: 30 TABLET | Refills: 1 | Status: SHIPPED | OUTPATIENT
Start: 2024-05-15 | End: 2024-05-29 | Stop reason: SDUPTHER

## 2024-05-29 ENCOUNTER — OFFICE VISIT (OUTPATIENT)
Dept: PSYCHIATRY | Facility: CLINIC | Age: 44
End: 2024-05-29
Payer: COMMERCIAL

## 2024-05-29 VITALS
HEART RATE: 80 BPM | SYSTOLIC BLOOD PRESSURE: 134 MMHG | BODY MASS INDEX: 28.75 KG/M2 | WEIGHT: 200.38 LBS | DIASTOLIC BLOOD PRESSURE: 81 MMHG

## 2024-05-29 DIAGNOSIS — G04.81 AUTOIMMUNE ENCEPHALITIS: ICD-10-CM

## 2024-05-29 DIAGNOSIS — F06.70 MILD NEUROCOGNITIVE DISORDER DUE TO ANOTHER MEDICAL CONDITION: ICD-10-CM

## 2024-05-29 DIAGNOSIS — F06.4 ANXIETY DISORDER DUE TO MEDICAL CONDITION: ICD-10-CM

## 2024-05-29 DIAGNOSIS — G93.41 ENCEPHALOPATHY, METABOLIC: ICD-10-CM

## 2024-05-29 DIAGNOSIS — F41.9 ANXIETY: Primary | ICD-10-CM

## 2024-05-29 PROCEDURE — 99213 OFFICE O/P EST LOW 20 MIN: CPT | Mod: S$GLB,,,

## 2024-05-29 PROCEDURE — 99999 PR PBB SHADOW E&M-EST. PATIENT-LVL III: CPT | Mod: PBBFAC,,,

## 2024-05-29 PROCEDURE — 1160F RVW MEDS BY RX/DR IN RCRD: CPT | Mod: CPTII,S$GLB,,

## 2024-05-29 PROCEDURE — 3075F SYST BP GE 130 - 139MM HG: CPT | Mod: CPTII,S$GLB,,

## 2024-05-29 PROCEDURE — 90833 PSYTX W PT W E/M 30 MIN: CPT | Mod: S$GLB,,,

## 2024-05-29 PROCEDURE — 3008F BODY MASS INDEX DOCD: CPT | Mod: CPTII,S$GLB,,

## 2024-05-29 PROCEDURE — 1159F MED LIST DOCD IN RCRD: CPT | Mod: CPTII,S$GLB,,

## 2024-05-29 PROCEDURE — 3079F DIAST BP 80-89 MM HG: CPT | Mod: CPTII,S$GLB,,

## 2024-05-29 RX ORDER — PAROXETINE HYDROCHLORIDE 20 MG/1
20 TABLET, FILM COATED ORAL EVERY MORNING
Qty: 30 TABLET | Refills: 0 | Status: SHIPPED | OUTPATIENT
Start: 2024-05-29 | End: 2024-06-04

## 2024-05-29 NOTE — PROGRESS NOTES
"Outpatient Psychiatry Follow-Up Visit (MD/NP)    5/29/2024    Clinical Status of Patient:  Outpatient (Ambulatory)    Chief Complaint:  Jayesh Rose is a 43 y.o. male who presents today for follow-up of anxiety and memory problem.  Met with patient.      Plan of care last visit:  Stop lexapro  Stop modafinil  Start paroxetine (paxil) 20 MG  Start trazodone (desyrel) 50 MG    Since last visit, modafinil was restarted by neurologist as requested by patient and his wife.     Interval History and Content of Current Session:  Interim Events/Subjective Report/Content of Current Session: Presents 31 minutes late for 30 minute appointment. States he got lost. He's alert, casually dressed, anxious mood and affect. Reports overall he is doing better. Reports less anxiety and he feels less "spacey". Reports he is working full time again for [employer]. Reports he struggles with being impatient and states he knows his condition has improved but not where he wants it to be.     Of note, patient does not have a typed note from his wife with notes about his condition and he is not carrying a notebook as on previous visit.     Pt reports  taking medications as prescribed except he does not take trazodone and he denies side effects of medications. States he is not taking trazodone due to wanting to hear and respond when his 3 y.o. son wakes up at night.    Endorses consistent depression symptoms over the past 2 weeks including decreased interest/motivation/pleasure/energy/appetite/concentration/sleep. States overall sleep is ok. Reports if it were not for his 3 y.o. child waking up during the night he would be sleeping well. Denies difficulty with interests and motivation. Endorses feelings of guilt. Overall energy level is decreased. Ability to concentrate is getting better but not ideal. Reports he carry's a notebook to take notes so that he does not ask a lot of the same questions over and over again as his place of " employment. Currently appetite is ok. Denies any significant fluctuations in weight. Denies psychomotor retardation or agitation. Denies anhedonia.    Denies suicide/homicide ideations. Denies A/V/H.    Psychiatric History:    Denies any history of manic symptoms, including decreased need for sleep, increased energy, increased goal oriented behavior, risky behavior, racing thoughts.     Denies any history of psychotic symptoms, including AVH, paranoia, thought insertion/broadcasting/withdrawal, delusions.     Endorses LUCI symptoms including excess worry, tension, insomnia. Denies panic attacks.     Denies history of PTSD symptoms including flashbacks, nightmares, hypervigilance.    Denies OCD and eating disorder symptoms.    Psychotherapy:  Target symptoms: anxiety   Why chosen therapy is appropriate versus another modality: relevant to diagnosis  Outcome monitoring methods: self-report, observation  Therapeutic intervention type: insight oriented psychotherapy, supportive psychotherapy  Topics discussed/themes: building skills sets for symptom management, symptom recognition  The patient's response to the intervention is accepting. The patient's progress toward treatment goals is good.   Duration of intervention: 16 minutes.    Review of Systems   Review of Systems   Psychiatric/Behavioral:  The patient is nervous/anxious.    All other systems reviewed and are negative.       Past Medical, Family and Social History: The patient's past medical, family and social history have been reviewed and updated as appropriate within the electronic medical record - see encounter notes.    Compliance: yes    Side effects: None    Risk Parameters:  Patient reports no suicidal ideation  Patient reports no homicidal ideation  Patient reports no self-injurious behavior  Patient reports no violent behavior    Exam (detailed: at least 9 elements; comprehensive: all 15 elements)   Constitutional  Vitals:  Most recent vital signs, dated  less than 90 days prior to this appointment, were reviewed.   There were no vitals filed for this visit.  /81   Pulse 80   Wt 90.9 kg (200 lb 6.4 oz)   BMI 28.75 kg/m²     General:  unremarkable, age appropriate, casually dressed, neatly groomed     Musculoskeletal  Muscle Strength/Tone:  not examined   Gait & Station:  non-ataxic     Psychiatric  Speech:  no latency; no press, spontaneous   Mood & Affect:  anxious  mood-congruent   Thought Process:  goal-directed, logical   Associations:  intact   Thought Content:  normal, no suicidality, no homicidality, delusions, or paranoia   Insight:  intact, has awareness of illness   Judgement: behavior is adequate to circumstances   Orientation:  grossly intact   Memory: intact for content of interview   Language: grossly intact   Attention Span & Concentration:  able to focus   Fund of Knowledge:  intact and appropriate to age and level of education     Assessment and Diagnosis   Status/Progress: Based on the examination today, the patient's problem(s) is/are resolving.  New problems have not been presented today.   Co-morbidities are not complicating management of the primary condition.  There are no active rule-out diagnoses for this patient at this time.     General Impression: 43 y.o. male with a history of anxiety, panic attacks, and memory problems since encephalopathy and seizure May 2023. Previous trial of lexpro ineffective in controlling anxiety symptoms. Reports feeling paxil has been helpful and spoke to patient's neurologist who stated it is not contraindicated. Patient agreeable to no changes to medications/doses currently.     Of note, assisted patient in finding his vehicle by looking at a picture he took before getting out of his car for this appointment.     Intervention/Counseling/Treatment Plan   Medication Management: Continue current medications. The risks and benefits of medication were discussed with the patient.  Labs, Diagnostic Studies:  reviewed   Care Coordination: During the visit, care coordination was conducted with Dr. Garcia (neurology).  paroxetine (paxil) 20 MG tablet; Take 1 tablet (20 mg); by mouth; every morning; to target anxiety/depression; Disp. #90; refills-0  Continue trazodone (desyrel) 50 MG; Take 1/2-1 tablet (25-50 mg; by mouth; every night; as needed to target depression and off label insomnia; No refills needed  Keep all follow up appointments  Return to Clinic: 3 months    VIRA Marshall

## 2024-06-04 RX ORDER — PAROXETINE HYDROCHLORIDE 20 MG/1
20 TABLET, FILM COATED ORAL EVERY MORNING
Qty: 90 TABLET | Refills: 0 | Status: SHIPPED | OUTPATIENT
Start: 2024-06-04

## 2024-06-11 ENCOUNTER — TELEPHONE (OUTPATIENT)
Dept: NEUROLOGY | Facility: CLINIC | Age: 44
End: 2024-06-11
Payer: COMMERCIAL

## 2024-06-11 ENCOUNTER — OFFICE VISIT (OUTPATIENT)
Dept: NEUROLOGY | Facility: CLINIC | Age: 44
End: 2024-06-11
Payer: COMMERCIAL

## 2024-06-11 DIAGNOSIS — F41.0 PANIC ATTACKS: ICD-10-CM

## 2024-06-11 DIAGNOSIS — F06.70 MILD NEUROCOGNITIVE DISORDER DUE TO ANOTHER MEDICAL CONDITION: Primary | ICD-10-CM

## 2024-06-11 PROCEDURE — 99499 UNLISTED E&M SERVICE: CPT | Mod: 95,,, | Performed by: CLINICAL NEUROPSYCHOLOGIST

## 2024-06-11 PROCEDURE — 90791 PSYCH DIAGNOSTIC EVALUATION: CPT | Mod: 95,,, | Performed by: CLINICAL NEUROPSYCHOLOGIST

## 2024-06-11 NOTE — TELEPHONE ENCOUNTER
Called and spoke wife about scheduling the MRI. Offered next morning appt--Monday, July 8th @ 8am. Pt's wife verbalized understanding and confirmed appt details.     ----- Message from Te Garcia MD sent at 6/11/2024 12:11 PM CDT -----  Regarding: FW: mri  Hi Regina, can you please help with this?  ----- Message -----  From: INGRID Wilkins II, PhD  Sent: 6/11/2024  10:29 AM CDT  To: Te Garcia MD; Tati Berumen MD  Subject: mri                                              Hey y'all    Can one of the MA/RNs call the wife to help schedule the mri? Wife is not certain best way to do this?    Thanks

## 2024-06-11 NOTE — PROGRESS NOTES
"NEUROPSYCHOLOGY CONSULT (TELEHEALTH)    Referral Information  Name: Jayesh Rose  MRN: [id number]  : 1980  Age: 43 y.o.  Gender: male  Referring Provider: Tati Berumen Md  [address] [place] [address]  Telemedicine:   The patient location is: Home  The provider location is: [facility] [place]  The chief complaint leading to consultation/medical necessity is: Cognitive concerns  Visit type: Virtual visit with synchronous audio and video   Total time spent with patient: 60-min  Each patient to whom he or she provides medical services by telemedicine is:  (1) informed of the relationship between the physician and patient and the respective role of any other health care provider with respect to management of the patient; and (2) notified that he or she may decline to receive medical services by telemedicine and may withdraw from such care at any time.  Consent/Emergency Plan: The patient expressed an understanding of the purpose of the evaluation and consented to all procedures.     SUMMARY/TREATMENT PLAN   Results from the interview indicate the following diagnoses and treatment plan recommendations. This was discussed with the patient and his wife. He can follow a treatment plan but needs some oversight.    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 Still uses memory compensatory strategies to good effect. Rates himself around 65% of his normal self for memory. Reports still problems with spatial function (navigating).   Wife: "Made some improvements but memory is a huge factor." She notes quite a bit of variability however for short-term memory and she is uncertain why aside from consistent strategy use. She is worried " "whether this may be new normal going forward.   Plan:  - neuropsych testing to assess cognitive status, update differential diagnosis and update treatment plan to optimize cognition for the patient along with provide recommendations for family/treatment providers            Psychiatric    Panic attacks    Current Assessment & Plan     Assessment:  -symptoms improved after October 2024 ED admission for panic attacks with medication change  -now patient experiences 1 panic attack weekly that is mild and manageable along with much more significant panic attacks occurring in clusters over 2-days every 3 months. Afterward, he has 2 days of fairly extended symptoms after clusters of panic attacks (residual anxiety, physical symptoms, ruminative worry, fear)  Plan:  -because this occurs at a three-month interval, recommended that wife review medication adherence more regularly to see if there is some correlation with patient forgetting medication  -will provide additional recommendations as a part of the neuropsychological evaluation                HISTORY OF PRESENT ILLNESS      2023-12 Neurology Follow-up: See note for full details but some cognitive recovery but not fully with loss of job. Also, increased mood sxs.      2023-07 Initial Neuropsychology Consult: See report for full details.   Cognitive Symptoms:  Onset: Abrupt (review inpatient notes) and admitted to [facility] 05/18/23.  Ultimately, diagnosed with autoimmune encephalitis (review imaging and EEG below).  Course:  Acute confusion improved by the end of his hospitalization but residual and significant short-term memory trouble remained at discharge  Over the past 30 days, he notes very modest improvement, if any" for short-term memory.    He notes ongoing and significant short-term memory trouble such that he must use various strategies (writing things down, LEs, recording, alarms as prompts    Day to day: Mornings are hardest and wakes up consistently feeling " "disoriented (now expects it) and is less distressed by it. After about an hour, he feels more oriented and sharper with using a lot of structure. 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 Also, findings noted increased trouble with visual-spatial skills (nonverbal reasoning, visual perception) and  increased variability with attention/working memory. Otherwise, scores were largely within expectations for verbal skills, most executive functions, mental speed, basic attention, and language measures.   -Etiology/Severity:   1. 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  2. Certainly ongoing anxiety and depression will make his cognitive functions more inefficient day-to-day.  3. Since he is only 2 months out, more recovery is certainly likely.  However, he needs an adjustment to his treatment plan to include cognitive rehabilitation over the next year.    Plan:  Neuropsychology Follow-up Feedback session to review results/plan of care   Reevaluation is recommended in 12-months following implementation of recommendations to track cognitive status, refine diagnosis, update treatment plan.   Neurology Follow-up Continued Neurology follow-up    Mental Health Follow-up Psychology/Therapy: Consultation with a therapist and will discuss   Recommend [email or web address] for referrals/scheduling therapy.    Sleep Medicine Follow-up Consultation to assess current sleep quality, rule out a sleep-related disorder, and provide treatment recs.   Cognitive Rehab Placing referral       " "    CURRENT SYMPTOMS     Cognitive Symptoms:  Patient: Overall, "Still very aware at a deficit" but is better. Still uses memory compensatory strategies to good effect. Rates himself around 65% of his normal self for memory. Reports still problems with spatial function (navigating).   Wife: "Made some improvements but memory is a huge factor." She notes quite a bit of variability however for short-term memory and she is uncertain why aside from consistent strategy use. She is worried whether this may be new normal going forward.     Current Functional Status/Needs:  ADLs  Self-Care Eating Safety   Bathing: Independent  Bathroom: Independent  Other: Independent Independent     Instrumental IADLs:   Driving Medications/Health Household Finances   Independent and needs to use navigation stephany a lot more Largely independent for taking daily and is able to do refills   Wife helps him remember daily still  Independent Shared responsibilities      Psychiatric/Behavioral Symptoms:  Mood:  Depression/Dysphoria Anxiety/Fearfulness Irritability   Current: Improved for significant sxs with recent employment   -Still has some mild sxs mainly when he's alone and bored. Reports ongoing flatness and social withdrawal     Current: Improved in the past month but some brief increased anxiety/stress when he started his job. Overall, panic attacks are better (1x weekly min; sometimes 2x weekly).      Wife: Every 3-mos, he has some in clusters that are much more significant. He can't function on these days; he's dry heaving; This happened 2-weeks ago. [She's uncertain if there is a medication adherence issue given the 90-day cycle]    10/2024: Acute exacerbation in panic attacks with psychotic sxs with admission and significant improvement with med changes.  Current: "Been pretty patient lately"     Behavior:  Agitation/Resistance Delusions/Paranoia Hallucinations   None None None     Apathy/Motivation Repetitive/Restlessness Other "   -Feels flat generally   -Has less motivation/drive to do things None None     Neurovegetative:  Sleep/Nighttime  Appetite Energy   Falls asleep fine but disrupted 2/2 4yo    Once he falls asleep, then he stays asleep.  Adequate Adequate  -Squeezing in when I can.      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 Reports its a lateral move.   11/2023: Let go from [employer] and lawsuit; Reports having a neuropsych as a part of this but can't remember. Settled lawsuit successfully.    Family History   Problem Relation Name Age of Onset    Mental illness Mother      Bipolar disorder Mother      Drug abuse Mother      Heart disease Father      Mental illness Father      Depression Father      Drug abuse Father      Heart disease Brother      Depression Brother      Suicide Brother      Parkinsonism Brother      Cancer Maternal Grandfather      Cancer Paternal Grandfather       Family Neurologic History: Negative for heritable risk factors  Family Psychiatric History: Depression, see above    MEDICAL STATUS  Patient Active Problem List   Diagnosis    Overweight (BMI 25.0-29.9)    GERD (gastroesophageal reflux disease)    DDD (degenerative disc disease), lumbar    Seizure    Encephalopathy, metabolic    Adjustment disorder with mixed anxiety and depressed mood    Autoimmune encephalitis    Mild neurocognitive  "disorder due to another medical condition    Cognitive communication deficit    Panic attacks     Past Medical History:   Diagnosis Date    Anxiety 05/18/2023    Class 1 obesity in adult 05/18/2023    Depression     GERD (gastroesophageal reflux disease) 01/10/2019    Seizures      No past surgical history on file.    Updated/Relevant Neurologic History:  Falls: None  TBI: None  Seizures: Yes, during 05/18/2023 admission for autoimmune encephalitis  Stroke: None  Movement Concerns: None      Recent Labs  No results found for: "GRYUACKM14"  No results found for: "RPR"  No results found for: "FOLATE"  Lab Results   Component Value Date    TSH 0.399 (L) 11/07/2023     Lab Results   Component Value Date    HGBA1C 5.0 01/16/2019     Lab Results   Component Value Date    [id number] Non-reactive 05/18/2023       Neurodiagnostics  Year Diagnostic Results[Copied from Report]   2023-07-14   MRI There is been continued decrease in the signal changes within the medial temporal lobes and hippocampus bilaterally with resolution of diffusion restriction.  Findings are consistent with improvement.  No new abnormalities identified.        Electronically signed by: Miles Momin MD  Date:                                            07/14/2023 2023-05-20 EEG EEG from 5/20 showing one L temporal seizure and ictal rhythmic changes so pt loaded with 2g Keppra by neurology. No further seizures on EEG   2023-05-18 MRI Impression:     Symmetric FLAIR a diffusion restriction involving the bilateral mesial temporal lobes.  No associated abnormal enhancement.  While findings can be seen in the reported postictal setting, distribution is suggestive of autoimmune encephalitis.  Infectious encephalitis felt less likely.  Consider further evaluation with CSF markers.     This report was flagged in Epic as abnormal         Current Outpatient Medications:     guaifenesin (MUCINEX ORAL), Take 1 tablet by mouth 2 (two) times daily as needed " "(allergies)., Disp: , Rfl:     ibuprofen (ADVIL,MOTRIN) 200 MG tablet, Take 400-800 mg by mouth 2 (two) times daily as needed for Pain., Disp: , Rfl:     modafiniL (PROVIGIL) 100 MG Tab, Take 1 tablet (100 mg total) by mouth once daily., Disp: 30 tablet, Rfl: 3    multivitamin (THERAGRAN) per tablet, Take 1 tablet by mouth once daily., Disp: , Rfl:     omeprazole (PRILOSEC OTC) 20 MG tablet, Take 20 mg by mouth daily as needed (heartburn)., Disp: , Rfl:     paroxetine (PAXIL) 20 MG tablet, Take 1 tablet (20 mg total) by mouth every morning., Disp: 90 tablet, Rfl: 0    traZODone (DESYREL) 50 MG tablet, Take 1 tablet (50 mg total) by mouth every evening., Disp: 30 tablet, Rfl: 1    Updated/Relevant Psychiatric History:  Sxs: Longstanding episodes of depression that occurred intermittently throughout his life  Tx: In 2020, started tx for depression (meds, therapy). Uncertain if meds helped but therapy helped him be aware of triggers and feelings before sxs got "bad." Continues to follow with psych.    Substance Use:  Current:   Etoh: May have 1-2 beers 3 nights weekly;   THC: Nothing in the past month. Previously, tried Using a vape pin but likely exacerbating anxiety.    Previous: Social drinking and maybe some binging episodes ("normal for [place]"); occasional THC    MENTAL STATUS AND OBSERVATIONS:  APPEARANCE: Casually dressed and adequate grooming/hygiene.   ALERTNESS/ORIENTATION: Attentive and alert. Fully oriented (x5) to time and place  GAIT: Not assessed  MOTOR MOVEMENTS/MANNERISMS: Not assessed  SPEECH/LANGUAGE: Normal in rate, rhythm, tone, and volume. No significant word finding difficulty noted. Expressive and receptive language was normal.  STATED MOOD/AFFECT: The patients stated mood was "flat" Affect was congruent with stated mood.   Note: experienced a panic attack on video visit and we worked through it; resolved within 2-minutes.  INTERPERSONAL BEHAVIOR: Rapport was quickly and easily " established   SUICIDALITY/HOMICIDALITY: None reported  HALLUCINATIONS/DELUSIONS: None evidenced or endorsed  THOUGHT PROCESSES/INSIGHT: Thoughts seemed logical and goal-directed.     BILLING  Service Description CPT Code Minutes Units   Psychiatric diagnostic evaluation by physician [phone]   Neurobehavioral status exam by physician [id number]  0   Each additional hour by physician [id number]  0       PROCEDURES PROPOSED/TEST BATTERY:  Advanced Clinical Solutions (ACS) Test of Pre-Morbid Functioning (TOPF), Green's MSVT, Wechsler Adult Intelligence Scale-Fourth Edition (WAIS-IV Core) Trail Making Test (TMT-A&B; Patel et al., 2004), Verbal Fluency Test(FAS/Animals; Patel et al., 2004), NAB Naming Test, Pillo Complex Figure Copy Trial(Pillo CFT), [place] Verbal Learning Test-Second Edition (CVLT-2), Wechsler Memory Scale-Fourth Edition (WMS-IV) Designs, Symbol Span, Logical Memory and Visual Reproduction subtests, [place] Card Sorting Test (WCST-128), Grooved Pegboard (GPT; Patel et al., 2004), Finger Tapping  (FTT; Patel et al., 2004),Self: FrSBe; LUCI-7/PHQ-9 Wife: FrSBe

## 2024-06-11 NOTE — ASSESSMENT & PLAN NOTE
"Assessment:  Patient: Overall, "Still very aware at a deficit" but is better. Still uses memory compensatory strategies to good effect. Rates himself around 65% of his normal self for memory. Reports still problems with spatial function (navigating).   Wife: "Made some improvements but memory is a huge factor." She notes quite a bit of variability however for short-term memory and she is uncertain why aside from consistent strategy use. She is worried whether this may be new normal going forward.   Plan:  - neuropsych testing to assess cognitive status, update differential diagnosis and update treatment plan to optimize cognition for the patient along with provide recommendations for family/treatment providers  "

## 2024-06-11 NOTE — ASSESSMENT & PLAN NOTE
Assessment:  -symptoms improved after October 2024 ED admission for panic attacks with medication change  -now patient experiences 1 panic attack weekly that is mild and manageable along with much more significant panic attacks occurring in clusters over 2-days every 3 months. Afterward, he has 2 days of fairly extended symptoms after clusters of panic attacks (residual anxiety, physical symptoms, ruminative worry, fear)  Plan:  -because this occurs at a three-month interval, recommended that wife review medication adherence more regularly to see if there is some correlation with patient forgetting medication  -will provide additional recommendations as a part of the neuropsychological evaluation

## 2024-07-08 ENCOUNTER — HOSPITAL ENCOUNTER (OUTPATIENT)
Dept: RADIOLOGY | Facility: HOSPITAL | Age: 44
Discharge: HOME | End: 2024-07-08
Attending: STUDENT IN AN ORGANIZED HEALTH CARE EDUCATION/TRAINING PROGRAM
Payer: COMMERCIAL

## 2024-07-08 DIAGNOSIS — G04.81 AUTOIMMUNE ENCEPHALITIS: ICD-10-CM

## 2024-07-08 PROCEDURE — A9585 GADOBUTROL INJECTION: HCPCS | Performed by: STUDENT IN AN ORGANIZED HEALTH CARE EDUCATION/TRAINING PROGRAM

## 2024-07-08 PROCEDURE — 25500020 PHARM REV CODE 255: Performed by: STUDENT IN AN ORGANIZED HEALTH CARE EDUCATION/TRAINING PROGRAM

## 2024-07-08 PROCEDURE — 70553 MRI BRAIN STEM W/O & W/DYE: CPT | Mod: 26,,, | Performed by: RADIOLOGY

## 2024-07-08 PROCEDURE — 70553 MRI BRAIN STEM W/O & W/DYE: CPT | Mod: TC

## 2024-07-08 RX ORDER — GADOBUTROL 604.72 MG/ML
10 INJECTION INTRAVENOUS
Status: COMPLETED | OUTPATIENT
Start: 2024-07-08 | End: 2024-07-08

## 2024-07-08 RX ADMIN — GADOBUTROL 10 ML: 604.72 INJECTION INTRAVENOUS at 09:07

## 2024-07-22 RX ORDER — TRAZODONE HYDROCHLORIDE 50 MG/1
50 TABLET ORAL NIGHTLY
Qty: 30 TABLET | Refills: 1 | Status: SHIPPED | OUTPATIENT
Start: 2024-07-22 | End: 2024-09-20

## 2024-08-12 DIAGNOSIS — F06.70 MILD NEUROCOGNITIVE DISORDER DUE TO ANOTHER MEDICAL CONDITION: ICD-10-CM

## 2024-08-23 ENCOUNTER — PATIENT MESSAGE (OUTPATIENT)
Facility: CLINIC | Age: 44
End: 2024-08-23
Payer: COMMERCIAL

## 2024-08-30 RX ORDER — MODAFINIL 100 MG/1
100 TABLET ORAL DAILY
Qty: 30 TABLET | Refills: 3 | Status: SHIPPED | OUTPATIENT
Start: 2024-08-30 | End: 2024-12-28

## 2024-09-24 ENCOUNTER — OFFICE VISIT (OUTPATIENT)
Dept: NEUROLOGY | Facility: CLINIC | Age: 44
End: 2024-09-24
Payer: COMMERCIAL

## 2024-09-24 DIAGNOSIS — F43.23 ADJUSTMENT DISORDER WITH MIXED ANXIETY AND DEPRESSED MOOD: ICD-10-CM

## 2024-09-24 DIAGNOSIS — G04.81 AUTOIMMUNE ENCEPHALITIS: ICD-10-CM

## 2024-09-24 DIAGNOSIS — F06.70 MILD NEUROCOGNITIVE DISORDER DUE TO ANOTHER MEDICAL CONDITION: Primary | ICD-10-CM

## 2024-09-24 PROCEDURE — 96139 PSYCL/NRPSYC TST TECH EA: CPT | Mod: S$GLB,,, | Performed by: CLINICAL NEUROPSYCHOLOGIST

## 2024-09-24 PROCEDURE — 96138 PSYCL/NRPSYC TECH 1ST: CPT | Mod: S$GLB,,, | Performed by: CLINICAL NEUROPSYCHOLOGIST

## 2024-09-24 PROCEDURE — 99499 UNLISTED E&M SERVICE: CPT | Mod: S$GLB,,, | Performed by: CLINICAL NEUROPSYCHOLOGIST

## 2024-09-24 PROCEDURE — 96132 NRPSYC TST EVAL PHYS/QHP 1ST: CPT | Mod: S$GLB,,, | Performed by: CLINICAL NEUROPSYCHOLOGIST

## 2024-09-24 PROCEDURE — 96133 NRPSYC TST EVAL PHYS/QHP EA: CPT | Mod: S$GLB,,, | Performed by: CLINICAL NEUROPSYCHOLOGIST

## 2024-09-27 NOTE — PROGRESS NOTES
Outpatient Neuropsychological Evaluation  Referral Information  Name: Jayesh Rose  MRN: [id number]  : 1980  Age: 44 y.o.  Gender: male  The chief complaint leading to consultation/medical necessity is: Cognitive concerns s/p encephalitis in  with re-evaluation needed  Visit type: Testing, report, treatment plan with initial visit on 2024      SUMMARY/TREATMENT PLAN   Results indicate the following diagnoses and treatment plan recommendations. 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 He continues to have very significant memory impairment (verbal and visual with visual still slightly better). Also, findings noted lingering visual-spatial difficulties (nonverbal reasoning, visual perception). Many aspects of attention/working memory and processing speed remain normal. This is good news, particularly for using strategies to minimize memory trouble. Otherwise, verbal skills, most executive functions, and motor speed/dexterity were within expectations.   -Etiology/Severity:   1. 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 While using cognitive strategies will help compensate, his  memory trouble is likely to continue for foreseeable future.   2. Certainly anxiety and depression exacerbate cognitive sxs depending on sx severity day to day. This is not uncommon s/p autoimmune encephalitis.   3. Will reinforce prognosis, plan for compensating for deficits, and other concerns    Plan:  Neuropsychology Follow-up Feedback session to review  results/plan of care   No re-evaluation planned unless there is a significant change    Neurology Follow-up Continued Neurology follow-up    Mental Health Follow-up Will discuss current mental health treatment plan given sxs   Cognitive Strategies Will discuss self implemented along with family help and potentially involving a cognitive rehabilitation .       Recommendations for Mr. Rose and Caregivers/Family:   Brain Health: Engage in regular exercise, which increases alertness and arousal and can improve attention and focus.    Get a good nights sleep, as this can enhance alertness and cognition.  Eat healthy foods and balanced meals. It is notable that research indicates certain nutrients may aid in brain function, such as B vitamins (especially B6, B12, and folic acid), antioxidants (such as vitamins C and E, and beta carotene), and Omega-3 fatty acids. Talk with your physician or nutritionist about whats right for you.   Keep your brain active. Find activities to stay mentally active, such as reading, games (cards, checkers), puzzles (crosswords, Sudoku, jig saw), crafts (models, woodworking), gardening, or participating in activities in the community.  Stay socially engaged. Continue staying active with your family and friends.   Sleep Tips Poor sleep has a negative effect on cognition. Several strategies have been shown to improve sleep:   Caffeine intake in the afternoon and evening, as well as stuffing oneself at supper, can decrease the quality of restful sleep throughout the night.   Bedtime and wake-up times should be consistent every night and morning so the body becomes used to a single routine, even on the weekends.  Engage in daily physical activity, but not 2-3 hours before bedtime.   No technology use (television, computer, iPad) 1-2 hours before bed.   Have a wind down routine (e.g., soft lights in the house, bath before bed, reduced fluid intake, songs, reading, less noise) to promote  "sleep readiness.   Visit the www.sleepfoundation.org for more strategies.   Executive Functioning Tips: Dont attempt to multi-task.  Separate tasks so that each can be completed one at a time.  Consider using a calendar/day planner, as that may be effective to help you plan and stay on track.  Color-coding specific tasks by importance may add additional benefit to your planner.  Break down large projects into smaller tasks and write down the steps to completing the task.    Memory Tips- Learning something (initially reading something, talking to someone) Rehearse - Immediately after seeing/hearing something, try to recall it.  Wait a few minutes, then check again.  Gradually lengthen the intervals between rehearsals.  Repetition of learned material is critical to ensure storage of information to be learned. Self-test at home to ensure learning.  Write down important information to improve your attention and focus and to have something to look back on when you need to recall it.  Make sure the person doesnt rattle off, but presents in a clear, logical, and unhurried manner.                  Psychiatric    Adjustment disorder with mixed anxiety and depressed mood       ID    Autoimmune encephalitis           HISTORY OF PRESENT ILLNESS   2023-12 Neurology Follow-up: See note for full details but some cognitive recovery but not fully with loss of job. Also, increased mood sxs.      2023-07 Initial Neuropsychology Consult: See report for full details.   Cognitive Symptoms:  Onset: Abrupt (review inpatient notes) and admitted to [facility] 05/18/23.  Ultimately, diagnosed with autoimmune encephalitis (review imaging and EEG below).  Course:  Acute confusion improved by the end of his hospitalization but residual and significant short-term memory trouble remained at discharge  Over the past 30 days, he notes very modest improvement, if any" for short-term memory.    He notes ongoing and significant short-term memory trouble " "such that he must use various strategies (writing things down, LEs, recording, alarms as prompts    Day to day: Mornings are hardest and wakes up consistently feeling disoriented (now expects it) and is less distressed by it. After about an hour, he feels more oriented and sharper with using a lot of structure. 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 Also, findings noted increased trouble with visual-spatial skills (nonverbal reasoning, visual perception) and  increased variability with attention/working memory. Otherwise, scores were largely within expectations for verbal skills, most executive functions, mental speed, basic attention, and language measures.   -Etiology/Severity:   1. 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  2. Certainly ongoing anxiety and depression will make his cognitive functions more inefficient day-to-day.  3. Since he is only 2 months out, more recovery is certainly likely.  However, he needs an adjustment to his treatment plan to include cognitive rehabilitation over the next year.    Plan:  Neuropsychology Follow-up Feedback session to review results/plan of care   Reevaluation is recommended in 12-months following implementation of recommendations to track cognitive status, refine diagnosis, update treatment plan.   Neurology Follow-up Continued Neurology follow-up    Mental Health Follow-up Psychology/Therapy: Consultation with a therapist and will discuss   Recommend [email or web address] for referrals/scheduling therapy.    Sleep Medicine " "Follow-up Consultation to assess current sleep quality, rule out a sleep-related disorder, and provide treatment recs.   Cognitive Rehab Placing referral              CURRENT SYMPTOMS     Cognitive Symptoms:  Patient: Overall, "Still very aware at a deficit" but is better. Still uses memory compensatory strategies to good effect. Rates himself around 65% of his normal self for memory. Reports still problems with spatial function (navigating).   Wife: "Made some improvements but memory is a huge factor." She notes quite a bit of variability however for short-term memory and she is uncertain why aside from consistent strategy use. She is worried whether this may be new normal going forward.     Current Functional Status/Needs:  ADLs  Self-Care Eating Safety   Bathing: Independent  Bathroom: Independent  Other: Independent Independent     Instrumental IADLs:   Driving Medications/Health Household Finances   Independent and needs to use navigation stephany a lot more Largely independent for taking daily and is able to do refills   Wife helps him remember daily still  Independent Shared responsibilities      Psychiatric/Behavioral Symptoms:  Mood:  Depression/Dysphoria Anxiety/Fearfulness Irritability   Current: Improved for significant sxs with recent employment   -Still has some mild sxs mainly when he's alone and bored. Reports ongoing flatness and social withdrawal     Current: Improved in the past month but some brief increased anxiety/stress when he started his job. Overall, panic attacks are better (1x weekly min; sometimes 2x weekly).      Wife: Every 3-mos, he has some in clusters that are much more significant. He can't function on these days; he's dry heaving; This happened 2-weeks ago. [She's uncertain if there is a medication adherence issue given the 90-day cycle]    10/2024: Acute exacerbation in panic attacks with psychotic sxs with admission and significant improvement with med changes.  Current: "Been pretty " "patient lately"     Behavior:  Agitation/Resistance Delusions/Paranoia Hallucinations   None None None     Apathy/Motivation Repetitive/Restlessness Other   -Feels flat generally   -Has less motivation/drive to do things None None     Neurovegetative:  Sleep/Nighttime  [employer]   Falls asleep fine but disrupted 2/2 2yo    Once he falls asleep, then he stays asleep.  Adequate Adequate  -Squeezing in when I can.      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 Reports its a lateral move.   11/2023: Let go from [employer] and lawsuit; Reports having a neuropsych as a part of this but can't remember. Settled lawsuit successfully.    Family History   Problem Relation Name Age of Onset    Mental illness Mother      Bipolar disorder Mother      Drug abuse Mother      Heart disease Father      Mental illness Father      Depression Father      Drug abuse Father      Heart disease Brother      Depression Brother      Suicide Brother      Parkinsonism Brother      Cancer Maternal Grandfather      Cancer Paternal Grandfather       Family Neurologic History: Negative for heritable risk factors  Family Psychiatric History: Depression, see above    MEDICAL STATUS  Patient Active Problem List   Diagnosis    Overweight (BMI 25.0-29.9)    GERD (gastroesophageal reflux disease)    DDD (degenerative disc disease), " "lumbar    Seizure    Encephalopathy, metabolic    Adjustment disorder with mixed anxiety and depressed mood    Autoimmune encephalitis    Mild neurocognitive disorder due to another medical condition    Cognitive communication deficit    Panic attacks     Past Medical History:   Diagnosis Date    Anxiety 05/18/2023    Class 1 obesity in adult 05/18/2023    Depression     GERD (gastroesophageal reflux disease) 01/10/2019    Seizures      No past surgical history on file.    Updated/Relevant Neurologic History:  Falls: None  TBI: None  Seizures: Yes, during 05/18/2023 admission for autoimmune encephalitis  Stroke: None  Movement Concerns: None      Recent Labs  No results found for: "HINBGOAJ47"  No results found for: "RPR"  No results found for: "FOLATE"  Lab Results   Component Value Date    TSH 0.399 (L) 11/07/2023     Lab Results   Component Value Date    HGBA1C 5.0 01/16/2019     Lab Results   Component Value Date    [id number] Non-reactive 05/18/2023       Neurodiagnostics  Year Diagnostic Results[Copied from Report]   2024-07 MRI Resolution the T2-FLAIR hyperintensity within the bilateral mesial temporal lobes. Subtle relative expansion of the bilateral temporal horns compared to initial imaging of 05/19/2023, may reflect resolution of prior edema versus atrophic change in this region.    2023-07-14   MRI There is been continued decrease in the signal changes within the medial temporal lobes and hippocampus bilaterally with resolution of diffusion restriction.  Findings are consistent with improvement.  No new abnormalities identified.        Electronically signed by: Miles Momin MD  Date:                                            07/14/2023 2023-05-20 EEG EEG from 5/20 showing one L temporal seizure and ictal rhythmic changes so pt loaded with 2g Keppra by neurology. No further seizures on EEG   2023-05-18 MRI Impression:     Symmetric FLAIR a diffusion restriction involving the bilateral mesial temporal " "lobes.  No associated abnormal enhancement.  While findings can be seen in the reported postictal setting, distribution is suggestive of autoimmune encephalitis.  Infectious encephalitis felt less likely.  Consider further evaluation with CSF markers.     This report was flagged in Epic as abnormal         Current Outpatient Medications:     guaifenesin (MUCINEX ORAL), Take 1 tablet by mouth 2 (two) times daily as needed (allergies)., Disp: , Rfl:     ibuprofen (ADVIL,MOTRIN) 200 MG tablet, Take 400-800 mg by mouth 2 (two) times daily as needed for Pain., Disp: , Rfl:     modafiniL (PROVIGIL) 100 MG Tab, Take 1 tablet (100 mg total) by mouth once daily., Disp: 30 tablet, Rfl: 3    multivitamin (THERAGRAN) per tablet, Take 1 tablet by mouth once daily., Disp: , Rfl:     omeprazole (PRILOSEC OTC) 20 MG tablet, Take 20 mg by mouth daily as needed (heartburn)., Disp: , Rfl:     paroxetine (PAXIL) 20 MG tablet, Take 1 tablet (20 mg total) by mouth every morning., Disp: 90 tablet, Rfl: 0    traZODone (DESYREL) 50 MG tablet, Take 1 tablet (50 mg total) by mouth every evening., Disp: 30 tablet, Rfl: 1    Updated/Relevant Psychiatric History:  Sxs: Longstanding episodes of depression that occurred intermittently throughout his life  Tx: In 2020, started tx for depression (meds, therapy). Uncertain if meds helped but therapy helped him be aware of triggers and feelings before sxs got "bad." Continues to follow with psych.    Substance Use:  Current:   Etoh: May have 1-2 beers 3 nights weekly;   THC: Nothing in the past month. Previously, tried Using a vape pin but likely exacerbating anxiety.    Previous: Social drinking and maybe some binging episodes ("normal for [place]"); occasional THC    MENTAL STATUS AND OBSERVATIONS:  APPEARANCE: Casually dressed and adequate grooming/hygiene.   ALERTNESS/ORIENTATION: Attentive and alert. Fully oriented (x5) to time and place  GAIT: Unremarkable  MOTOR MOVEMENTS/MANNERISMS: " "Unremarkable  SPEECH/LANGUAGE: Normal in rate, rhythm, tone, and volume. No significant word finding difficulty noted. Expressive and receptive language was normal.  STATED MOOD/AFFECT: The patients stated mood was "fine Affect was congruent with stated mood.   INTERPERSONAL BEHAVIOR: Rapport was quickly and easily established   SUICIDALITY/HOMICIDALITY: None reported  HALLUCINATIONS/DELUSIONS: None evidenced or endorsed  THOUGHT PROCESSES/INSIGHT: Thoughts seemed logical and goal-directed.   TEST TAKING BEHAVIOR/VALIDITY: Freestanding and embedded performance validity measures and observation of effort were suggestive of adequate engagement. The current results, therefore, are likely a valid reflection of the patient's current functioning.     PROCEDURES/TESTS ADMINISTERED   In addition to performing a review of pertinent medical records, reviewing limits to confidentiality, conducting a clinical interview, and explaining procedures, the following measures were administered:  [employer] (ACS) Test of Pre-Morbid Functioning (TOPF), Green's MSVT, Wechsler Adult Intelligence Scale-Fourth Edition (WAIS-IV Core) Trail Making Test (TMT-A&B; Patel et al., 2004), Verbal Fluency Test(FAS/Animals; Patel et al., 2004), NAB Naming Test, Pillo Complex Figure Copy Trial(Pillo CFT), [place] Verbal Learning Test-Second Edition (CVLT-2), Wechsler Memory Scale-Fourth Edition (WMS-IV) Designs, Symbol Span, Logical Memory and Visual Reproduction subtests, [place] Card Sorting Test (WCST-128), Grooved Pegboard (GPT; Patel, et al., 2004), Finger Tapping  (FTT; Patel et al., 2004),Self: FrSBe; LUCI-7/PHQ-9 Wife: FrSBe  Manual norms were used unless otherwise indicated.  Review data Appendix Below for scores and percentiles.      BILLING   Service Description CPT Code Minutes Units   Test Evaluation Services   Neuropsychological testing evaluation services by physician [phone]   Each additional hour by " physician [phone]   Test Administration and Scoring   Psychological or neuropsychological test administration and scoring by technician [phone]   Each additional 30 minutes by technician [phone]     DATA APPENDIX:   Note: It is important to note that scores/percentiles should only be interpreted by a neuropsychologist. It is common for healthy individuals to have 1-3 isolated low/unusual scores that are not indicative of any significant cognitive dysfunction.       Raw Score Type of Standardized Score Standardized Score 2023 Data   MSVT  - - -   MSVT  - - -   MSVT Cons 100 - - -   MSVT PA 80 - - -   MSVT FR 45 - - -   ACS LM II Rec 19 - - -   ACS VR II Rec 6 - - -   ACS RDS 10 - - -   CVLT-II FC 94 - - -   PREMORBID FUNCTIONING Raw Score Type of Standardized Score Standardized Score 2023 Data   TOPF simple dem. eFSIQ -  109   TOPF pred. eFSIQ -  115   TOPF simple + pred. eFSIQ -  114   INTELLECTUAL FUNCTIONING Raw Score Type of Standardized Score Standardized Score 2023 Data   WAIS-IV       VCI -  116   RAYMOND - SS 84 82   WMI -  95   PSI -  114   FSIQ -  102   GAI - ss 103 100   Similarities 34 ss 16 13   Vocabulary 50 ss 14 14   Information 17 ss 12 12   Block Design 24 ss 6 6   Matrix Reasoning 14 ss 8 8   Visual Puzzles 11 ss 8 7   Digit Span 30 ss 11 11         DS Forward 13 ss 13 14         DS Backward 8 ss 9 8         DS Sequence 9 ss 10 10         Longest Digit Forward 9 - - -         Longest Digit Backward 4 - - -         Longest Digit Sequence 6 - - -   Arithmetic 15 ss 10 7   Symbol Search 41 ss 13 13   Coding 83 ss 12 12   LANGUAGE FUNCTIONING Raw Score Type of Standardized Score Standardized Score 2023 Data   WAIS-IV VCI -  116   WAIS-IV Similarities 34 ss 16 13   WAIS-IV Vocabulary 50 ss 14 14   WAIS-IV Information 17 ss 12 12   TOPF Word Reading 63  118   NAB Naming 31 Tscore 53 52   NAB Naming Percent Correct After Semantic  Cuing N/A - - -   NAB Naming Percent Correct After Phonemic Cuing N/A - - -   FAS 39 Tscore 47 50   Letter F  14 zscore     Animal Naming 22 Tscore 49 41   VISUOSPATIAL FUNCTIONING Raw Score Type of Standardized Score Standardized Score 2023 Data   WAIS-IV RAYMOND - SS 84 82   WAIS-IV Block Design 24 ss 6 6   WAIS-IV Matrix Reasoning 14 ss 8 8   WAIS-IV Visual Puzzles 11 ss 8 7   RCFT Copy 27 - - Raw=25.5   RCFT Time to Copy 140 - - -   LEARNING & MEMORY Raw Score Type of Standardized Score Standardized Score 2023 Data   CVLT-II       Trials 1-5 (T-Score) 29 Tscore 29 -   List A Trial 1 3 zscore -2 -   List A Trial 5 7 zscore -2.5 -   List B 5 zscore -0.5 -   SDFR 1 zscore -3 -   SDCR 5 zscore -2 -   LDFR 2 zscore -3 -   LDCR 5 zscore -2.5 -   Semantic Clustering -0.4 zscore -0.5 -   Learning [place] 0.9 zscore -1 -   Repetitions 4 zscore 0 -   Intrusions 25 zscore 5 -   Recognition Hits 11 zscore -3 -   False Positives 15 zscore 3.5 -   Discriminability 0.6 zscore -3 -   WMS-IV       Immediate Memory - SS 63 1   Delayed Memory - SS 55 0.1   Auditory Memory - SS 57 0.2   Visual Memory - SS 67 1   WMS-IV Subtests       LM I 14 ss 5 4   LM II 4 ss 2 2   LM Recognition 19 - - Raw=19   (CVLT-II Trials 1-5) 29  4 -   (CVLT-II Long Delay) -3  1 -   Designs I 44 ss 4 5   Designs II 31 ss 4 7   VR I 27 ss 5 5   VR II 13 ss 6 3   VR II Recognition 6 - - Raw=4   Symbol Span 18 ss 7 8   ATTENTION/WORKING MEMORY Raw Score Type of Standardized Score Standardized Score 2023 Data   WAIS-IV WMI -  95   WAIS-IV Digit Span 30 ss 11 11         DS Forward 13 ss 13 14         DS Backward 8 ss 9 8         DS Sequence 9 ss 10 10         Longest Digit Forward 9 - - -         Longest Digit Backward 4 - - -         Longest Digit Sequence 6 - - -   WAIS-IV Arithmetic 15 ss 10 7   MENTAL PROCESSING SPEED Raw Score Type of Standardized Score Standardized Score 2023 Data   WAIS-IV PSI -  114   WAIS-IV Symbol Search 41 ss 13 13   WAIS-IV  Coding 83 ss 12 12   TMT A  23 Tscore 52 34   TMT A errors 0 - - -   EXECUTIVE FUNCTIONING Raw Score Type of Standardized Score Standardized Score 2023 Data   TMT B 38 Tscore 67 54   TMT B errors 0 - - -   WCST-128       Total Correct 94   -   Total Errors 34 SS 86 105   Perseverative Resp. 16 SS 90 110   Perseverative Err. 16 SS 88 110   Nonperseverative Err. 18 SS 84 99   Concept. Level Response 88 SS 92 105   Categories Completed 5 - - Raw=6   FMS 4 - - Raw=0   Learning to Learn -6.43 - - Raw=0.00   WAIS-IV Similarities 34 ss 16 13   WAIS-IV Matrix Reasoning 14 ss 8 8   FrSBE: Before [self]       Total  96 Tscore 56 60   Apathy 31 Tscore 56 53   Disinhibition 28 Tscore 53 63   Executive Dysfunction 37 Tscore 57 58   FrSBE: After [self]       Total  125 Tscore 74 79   Apathy 45 Tscore 79 82   Disinhibition 32 Tscore 60 63   Executive Dysfunction 48 Tscore 73 79   FRONTOMOTOR  Raw Score Type of Standardized Score Standardized Score 2023 Data   GPT DH 65 Tscore 50 49   GPT NDH 81 Tscore 41L 32   FTT DH 56.3 Tscore 54 37   FTT NDH 48.2 Tscore 49 40   MOOD & PERSONALITY Raw Score Type of Standardized Score Standardized Score 2023 Data   PHQ-9 16 - - 19   LUCI-7 18 - - 15   ss = scaled score (mean = 10, SD = 3); SS = standard score (mean = 100, SD = 15); Tscore mean = 50, SD = 10; zscore (mean = 0.00, SD = 1)

## 2024-09-30 NOTE — ASSESSMENT & PLAN NOTE
Assessment:  -Cognitive Testing:  Unfortunately, results show no significant improvement compared to 2023. He continues to have very significant memory impairment (verbal and visual with visual still slightly better). Also, findings noted lingering visual-spatial difficulties (nonverbal reasoning, visual perception). Many aspects of attention/working memory and processing speed remain normal. This is good news, particularly for using strategies to minimize memory trouble. Otherwise, verbal skills, most executive functions, and motor speed/dexterity were within expectations.   -Etiology/Severity:   1. 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 While using cognitive strategies will help compensate, his  memory trouble is likely to continue for foreseeable future.   2. Certainly anxiety and depression exacerbate cognitive sxs depending on sx severity day to day. This is not uncommon s/p autoimmune encephalitis.   3. Will reinforce prognosis, plan for compensating for deficits, and other concerns    Plan:  Neuropsychology Follow-up Feedback session to review results/plan of care   No re-evaluation planned unless there is a significant change    Neurology Follow-up Continued Neurology follow-up    Mental Health Follow-up Will discuss current mental health treatment plan given sxs   Cognitive Strategies Will discuss self implemented along with family help and potentially involving a cognitive rehabilitation .       Recommendations for Mr. Rose and Caregivers/Family:   Brain Health: Engage in regular exercise, which increases alertness and arousal and can improve attention and focus.    Get a good nights sleep, as this can enhance alertness and cognition.  Eat healthy foods and balanced meals. It is notable that research indicates certain nutrients may aid in brain function, such as B vitamins (especially B6, B12, and folic acid), antioxidants  (such as vitamins C and E, and beta carotene), and Omega-3 fatty acids. Talk with your physician or nutritionist about whats right for you.   Keep your brain active. Find activities to stay mentally active, such as reading, games (cards, checkers), puzzles (crosswords, Sudoku, jig saw), crafts (models, woodworking), gardening, or participating in activities in the community.  Stay socially engaged. Continue staying active with your family and friends.   Sleep Tips Poor sleep has a negative effect on cognition. Several strategies have been shown to improve sleep:   Caffeine intake in the afternoon and evening, as well as stuffing oneself at supper, can decrease the quality of restful sleep throughout the night.   Bedtime and wake-up times should be consistent every night and morning so the body becomes used to a single routine, even on the weekends.  Engage in daily physical activity, but not 2-3 hours before bedtime.   No technology use (television, computer, iPad) 1-2 hours before bed.   Have a wind down routine (e.g., soft lights in the house, bath before bed, reduced fluid intake, songs, reading, less noise) to promote sleep readiness.   Visit the www.sleepfoundation.org for more strategies.   Executive Functioning Tips: Dont attempt to multi-task.  Separate tasks so that each can be completed one at a time.  Consider using a calendar/day planner, as that may be effective to help you plan and stay on track.  Color-coding specific tasks by importance may add additional benefit to your planner.  Break down large projects into smaller tasks and write down the steps to completing the task.    Memory Tips- Learning something (initially reading something, talking to someone) Rehearse - Immediately after seeing/hearing something, try to recall it.  Wait a few minutes, then check again.  Gradually lengthen the intervals between rehearsals.  Repetition of learned material is critical to ensure storage of information  to be learned. Self-test at home to ensure learning.  Write down important information to improve your attention and focus and to have something to look back on when you need to recall it.  Make sure the person doesnt rattle off, but presents in a clear, logical, and unhurried manner.

## 2024-10-10 ENCOUNTER — OFFICE VISIT (OUTPATIENT)
Dept: NEUROLOGY | Facility: CLINIC | Age: 44
End: 2024-10-10
Payer: COMMERCIAL

## 2024-10-10 DIAGNOSIS — F06.70 MILD NEUROCOGNITIVE DISORDER DUE TO ANOTHER MEDICAL CONDITION: Primary | ICD-10-CM

## 2024-10-10 DIAGNOSIS — F43.23 ADJUSTMENT DISORDER WITH MIXED ANXIETY AND DEPRESSED MOOD: ICD-10-CM

## 2024-10-10 PROCEDURE — 99499 UNLISTED E&M SERVICE: CPT | Mod: 95,,, | Performed by: CLINICAL NEUROPSYCHOLOGIST

## 2024-10-10 NOTE — PROGRESS NOTES
NEUROPSYCHOLOGY CONSULT - Feedback Visit Note (Telehealth)   Name:  Jayesh Rose    MRN:  [id number]   : 1980    Billing: Visit charges billed on2024   Visit Reason Feedback session for results/treatment plan discussion   Visit Type Virtual visit with synchronous audio and video   Telemedicine    Patient   Location Home   Provider Location [facility]   Time 60-min   Each patient to whom he or she provides medical services by telemedicine is:  (1) informed of the relationship between the physician and patient and the respective role of any other health care provider with respect to management of the patient; and (2) notified that he or she may decline to receive medical services by telemedicine and may withdraw from such care at any time.   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 Review Neuropsychology Consult dated for 2024 for complete details of feedback discussion, diagnosis, treatment plan.  -Additional concerns: None  -Follow-up: PRN                 Psychiatric    Adjustment disorder with mixed anxiety and depressed mood    Current Assessment & Plan     -Feedback session completed to discuss results and plan. Review Neuropsychology Consult dated for 2024 for complete details of feedback discussion, diagnosis, treatment plan.  -Additional concerns:   Discussed ways to manage behaviorally and he will attempt to implement  If not, then he will ask for psych referral from PCP or message me

## 2024-10-22 NOTE — ASSESSMENT & PLAN NOTE
-Feedback session completed to discuss results and plan. Review Neuropsychology Consult dated for 9/24/2024 for complete details of feedback discussion, diagnosis, treatment plan.  -Additional concerns: None  -Follow-up: PRN

## 2024-10-22 NOTE — ASSESSMENT & PLAN NOTE
-Feedback session completed to discuss results and plan. Review Neuropsychology Consult dated for 9/24/2024 for complete details of feedback discussion, diagnosis, treatment plan.  -Additional concerns:   Discussed ways to manage behaviorally and he will attempt to implement  If not, then he will ask for psych referral from PCP or message me

## 2024-10-25 ENCOUNTER — HOSPITAL ENCOUNTER (EMERGENCY)
Facility: HOSPITAL | Age: 44
Discharge: HOME | End: 2024-10-25
Attending: EMERGENCY MEDICINE
Payer: COMMERCIAL

## 2024-10-25 VITALS
HEIGHT: 70 IN | RESPIRATION RATE: 16 BRPM | WEIGHT: 200 LBS | HEART RATE: 61 BPM | SYSTOLIC BLOOD PRESSURE: 118 MMHG | BODY MASS INDEX: 28.63 KG/M2 | DIASTOLIC BLOOD PRESSURE: 71 MMHG | TEMPERATURE: 98 F | OXYGEN SATURATION: 97 %

## 2024-10-25 DIAGNOSIS — R56.9 SEIZURES: Primary | ICD-10-CM

## 2024-10-25 LAB
ALBUMIN SERPL BCP-MCNC: 3.8 G/DL (ref 3.5–5.2)
ALLENS TEST: ABNORMAL
ALP SERPL-CCNC: 79 U/L (ref 40–150)
ALT SERPL W/O P-5'-P-CCNC: 18 U/L (ref 10–44)
ANION GAP SERPL CALC-SCNC: 16 MMOL/L (ref 8–16)
AST SERPL-CCNC: 24 U/L (ref 10–40)
BASOPHILS # BLD AUTO: 0.04 K/UL (ref 0–0.2)
BASOPHILS NFR BLD: 0.4 % (ref 0–1.9)
BILIRUB SERPL-MCNC: 0.5 MG/DL (ref 0.1–1)
BUN SERPL-MCNC: 11 MG/DL (ref 6–20)
CALCIUM SERPL-MCNC: 8.9 MG/DL (ref 8.7–10.5)
CHLORIDE SERPL-SCNC: 106 MMOL/L (ref 95–110)
CO2 SERPL-SCNC: 15 MMOL/L (ref 23–29)
CREAT SERPL-MCNC: 1.3 MG/DL (ref 0.5–1.4)
DIFFERENTIAL METHOD BLD: ABNORMAL
EOSINOPHIL # BLD AUTO: 0.1 K/UL (ref 0–0.5)
EOSINOPHIL NFR BLD: 1.4 % (ref 0–8)
ERYTHROCYTE [DISTWIDTH] IN BLOOD BY AUTOMATED COUNT: 13.8 % (ref 11.5–14.5)
EST. GFR  (NO RACE VARIABLE): >60 ML/MIN/1.73 M^2
GLUCOSE SERPL-MCNC: 163 MG/DL (ref 70–110)
HCO3 UR-SCNC: 18.7 MMOL/L (ref 24–28)
HCT VFR BLD AUTO: 45.5 % (ref 40–54)
HCT VFR BLD CALC: 45 %PCV (ref 36–54)
HCV AB SERPL QL IA: NORMAL
HGB BLD-MCNC: 15 G/DL (ref 14–18)
HIV 1+2 AB+HIV1 P24 AG SERPL QL IA: NORMAL
IMM GRANULOCYTES # BLD AUTO: 0.04 K/UL (ref 0–0.04)
IMM GRANULOCYTES NFR BLD AUTO: 0.4 % (ref 0–0.5)
LYMPHOCYTES # BLD AUTO: 1.4 K/UL (ref 1–4.8)
LYMPHOCYTES NFR BLD: 14.1 % (ref 18–48)
MCH RBC QN AUTO: 27 PG (ref 27–31)
MCHC RBC AUTO-ENTMCNC: 33 G/DL (ref 32–36)
MCV RBC AUTO: 82 FL (ref 82–98)
MONOCYTES # BLD AUTO: 0.6 K/UL (ref 0.3–1)
MONOCYTES NFR BLD: 5.6 % (ref 4–15)
NEUTROPHILS # BLD AUTO: 7.9 K/UL (ref 1.8–7.7)
NEUTROPHILS NFR BLD: 78.1 % (ref 38–73)
NRBC BLD-RTO: 0 /100 WBC
OHS QRS DURATION: 86 MS
OHS QTC CALCULATION: 417 MS
PCO2 BLDA: 32.6 MMHG (ref 35–45)
PH SMN: 7.37 [PH] (ref 7.35–7.45)
PLATELET # BLD AUTO: 275 K/UL (ref 150–450)
PMV BLD AUTO: 11.4 FL (ref 9.2–12.9)
PO2 BLDA: 52 MMHG (ref 40–60)
POC BE: -7 MMOL/L
POC IONIZED CALCIUM: 1.1 MMOL/L (ref 1.06–1.42)
POC SATURATED O2: 86 % (ref 95–100)
POC TCO2: 20 MMOL/L (ref 24–29)
POCT GLUCOSE: 151 MG/DL (ref 70–110)
POTASSIUM BLD-SCNC: 4.2 MMOL/L (ref 3.5–5.1)
POTASSIUM SERPL-SCNC: 4.4 MMOL/L (ref 3.5–5.1)
PROT SERPL-MCNC: 7.1 G/DL (ref 6–8.4)
RBC # BLD AUTO: 5.55 M/UL (ref 4.6–6.2)
SAMPLE: ABNORMAL
SITE: ABNORMAL
SODIUM BLD-SCNC: 138 MMOL/L (ref 136–145)
SODIUM SERPL-SCNC: 137 MMOL/L (ref 136–145)
WBC # BLD AUTO: 10.07 K/UL (ref 3.9–12.7)

## 2024-10-25 PROCEDURE — 63600175 PHARM REV CODE 636 W HCPCS

## 2024-10-25 PROCEDURE — 82803 BLOOD GASES ANY COMBINATION: CPT

## 2024-10-25 PROCEDURE — 86803 HEPATITIS C AB TEST: CPT | Performed by: PHYSICIAN ASSISTANT

## 2024-10-25 PROCEDURE — 80053 COMPREHEN METABOLIC PANEL: CPT

## 2024-10-25 PROCEDURE — 96374 THER/PROPH/DIAG INJ IV PUSH: CPT

## 2024-10-25 PROCEDURE — 99284 EMERGENCY DEPT VISIT MOD MDM: CPT | Mod: 25

## 2024-10-25 PROCEDURE — 83605 ASSAY OF LACTIC ACID: CPT

## 2024-10-25 PROCEDURE — 84295 ASSAY OF SERUM SODIUM: CPT

## 2024-10-25 PROCEDURE — 94761 N-INVAS EAR/PLS OXIMETRY MLT: CPT | Mod: XB

## 2024-10-25 PROCEDURE — 85025 COMPLETE CBC W/AUTO DIFF WBC: CPT

## 2024-10-25 PROCEDURE — 94799 UNLISTED PULMONARY SVC/PX: CPT

## 2024-10-25 PROCEDURE — 82330 ASSAY OF CALCIUM: CPT

## 2024-10-25 PROCEDURE — 87389 HIV-1 AG W/HIV-1&-2 AB AG IA: CPT | Performed by: PHYSICIAN ASSISTANT

## 2024-10-25 PROCEDURE — 85014 HEMATOCRIT: CPT

## 2024-10-25 PROCEDURE — 82962 GLUCOSE BLOOD TEST: CPT

## 2024-10-25 PROCEDURE — 99900035 HC TECH TIME PER 15 MIN (STAT)

## 2024-10-25 PROCEDURE — 84132 ASSAY OF SERUM POTASSIUM: CPT

## 2024-10-25 RX ORDER — LEVETIRACETAM 500 MG/5ML
1000 INJECTION, SOLUTION, CONCENTRATE INTRAVENOUS
Status: COMPLETED | OUTPATIENT
Start: 2024-10-25 | End: 2024-10-25

## 2024-10-25 RX ORDER — LEVETIRACETAM 750 MG/1
750 TABLET ORAL 2 TIMES DAILY
Qty: 60 TABLET | Refills: 11 | Status: SHIPPED | OUTPATIENT
Start: 2024-10-25 | End: 2025-10-25

## 2024-10-25 RX ADMIN — LEVETIRACETAM 1000 MG: 100 INJECTION INTRAVENOUS at 03:10

## 2024-10-25 NOTE — DISCHARGE INSTRUCTIONS
Do not swim or drive until you see the neurologist.  Please take the Keppra twice a day.  Please return to the emergency department if you develop any difficulty seeing, difficulty walking, or any other symptoms that concern you.

## 2024-10-25 NOTE — ED TRIAGE NOTES
Jayesh Rose, a 44 y.o. male presents to the ED w/ complaint of seizures. Per EMS pt had witnessed seizure that lasted 15min. EMS gave 12mg IM Versed and seizures stopped. Pt arrives responsive to pain and protecting airway. Hx of seizures.     Triage note:  Chief Complaint   Patient presents with    Seizures     Review of patient's allergies indicates:  No Known Allergies  Past Medical History:   Diagnosis Date    Anxiety 05/18/2023    Class 1 obesity in adult 05/18/2023    Depression     GERD (gastroesophageal reflux disease) 01/10/2019    Seizures

## 2024-10-25 NOTE — ED PROVIDER NOTES
Source of History:  Patient and chart    Chief complaint:  Seizures      HPI:  Jayesh Rose is a 44 y.o. male with a medical history as below presents to the emergency department with a chief complaint of seizure.  Patient was wife was woken of with her  seizing next to her in bed.  It was a full tonic-clonic seizure.  His eyes rolling in the back of his head.  He had blood coming from his mouth from apparent tongue bite.  She called EMS.  Patient was still seizing upon EMS arrival.  They gave him 10 mg of intramuscular Versed.  Additionally, he required 2.5 mg of IV Versed to abort the seizure.  Patient was seizing for approximately 15 minutes.  Patient was does have a history of seizures.  Per the wife this seizure was similar to ones in the past.  The neurologist took him off Keppra in December of last year.  This is secondary to their belief that the seizures were caused by an autoimmune encephalitis that has since been managed.  Patient was well up until the time that he was seizing.  There was no trauma from the seizure.    Review of patient's allergies indicates:  No Known Allergies    No current facility-administered medications on file prior to encounter.     Current Outpatient Medications on File Prior to Encounter   Medication Sig Dispense Refill    guaifenesin (MUCINEX ORAL) Take 1 tablet by mouth 2 (two) times daily as needed (allergies).      ibuprofen (ADVIL,MOTRIN) 200 MG tablet Take 400-800 mg by mouth 2 (two) times daily as needed for Pain.      modafiniL (PROVIGIL) 100 MG Tab Take 1 tablet (100 mg total) by mouth once daily. 30 tablet 3    multivitamin (THERAGRAN) per tablet Take 1 tablet by mouth once daily.      omeprazole (PRILOSEC OTC) 20 MG tablet Take 20 mg by mouth daily as needed (heartburn).      paroxetine (PAXIL) 20 MG tablet Take 1 tablet (20 mg total) by mouth every morning. 90 tablet 0    traZODone (DESYREL) 50 MG tablet Take 1 tablet (50 mg total) by mouth every  evening. 30 tablet 1       PMH:  As per HPI and below:  Past Medical History:   Diagnosis Date    Anxiety 05/18/2023    Class 1 obesity in adult 05/18/2023    Depression     GERD (gastroesophageal reflux disease) 01/10/2019    Seizures      History reviewed. No pertinent surgical history.    Social History     Socioeconomic History    Marital status:    Tobacco Use    Smoking status: Never     Passive exposure: Never    Smokeless tobacco: Never   Substance and Sexual Activity    Alcohol use: Yes     Comment: Occasional with his wife on date night    Drug use: No    Sexual activity: Yes   Other Topics Concern    Patient feels they ought to cut down on drinking/drug use No    Patient annoyed by others criticizing their drinking/drug use No    Patient has felt bad or guilty about drinking/drug use No    Patient has had a drink/used drugs as an eye opener in the AM No     Social Drivers of Health     Financial Resource Strain: Low Risk  (6/11/2024)    Overall Financial Resource Strain (CARDIA)     Difficulty of Paying Living Expenses: Not very hard   Food Insecurity: No Food Insecurity (6/11/2024)    Hunger Vital Sign     Worried About Running Out of Food in the Last Year: Never true     Ran Out of Food in the Last Year: Never true   Transportation Needs: No Transportation Needs (5/19/2023)    PRAPARE - Transportation     Lack of Transportation (Medical): No     Lack of Transportation (Non-Medical): No   Physical Activity: Sufficiently Active (6/11/2024)    Exercise Vital Sign     Days of Exercise per Week: 3 days     Minutes of Exercise per Session: 60 min   Stress: Stress Concern Present (6/11/2024)    [employer] - Occupational Stress Questionnaire     Feeling of Stress : Rather much   Housing Stability: Low Risk  (5/19/2023)    Housing Stability Vital Sign     Unable to Pay for Housing in the Last Year: No     Number of Places Lived in the Last Year: 1     Unstable Housing in the  Last Year: No       Family History   Problem Relation Name Age of Onset    Mental illness Mother      Bipolar disorder Mother      Drug abuse Mother      Heart disease Father      Mental illness Father      Depression Father      Drug abuse Father      Heart disease Brother      Depression Brother      Suicide Brother      Parkinsonism Brother      Cancer Maternal Grandfather      Cancer Paternal Grandfather         Physical Exam:      Vitals:    10/25/24 0545   BP: 118/71   Pulse: 61   Resp: 16   Temp: 98.1 °F (36.7 °C)     Physical Exam  Gen:  Hemodynamically stable.  Postictal and heavily sedated secondary to Versed administration.  Mental Status:  Eyes will open to voice, patient was localizes pain, patient was protecting his own airway    Skin: Warm, dry. No rashes seen.  Eyes: No conjunctival injection.  Pupils are PERRLA  Pulm: CTAB. No increased work of breathing.  No significant tachypnea.  No conversational dyspnea.    CV: Regular rate.   Abd: Soft.  Not distended.  Nontender.   MSK: No deformities.    Neuro:  GCS of 9.  Patient was not verbal at this time.  Reflexes are intact.      Procedures  Laboratory Studies:  Labs Reviewed   CBC W/ AUTO DIFFERENTIAL - Abnormal       Result Value    WBC 10.07      RBC 5.55      Hemoglobin 15.0      Hematocrit 45.5      MCV 82      MCH 27.0      MCHC 33.0      RDW 13.8      Platelets 275      MPV 11.4      Immature Granulocytes 0.4      Gran # (ANC) 7.9 (*)     Immature Grans (Abs) 0.04      Lymph # 1.4      Mono # 0.6      Eos # 0.1      Baso # 0.04      nRBC 0      Gran % 78.1 (*)     Lymph % 14.1 (*)     Mono % 5.6      Eosinophil % 1.4      Basophil % 0.4      Differential Method Automated      Narrative:     Release to patient->Immediate   COMPREHENSIVE METABOLIC PANEL - Abnormal    Sodium 137      Potassium 4.4      Chloride 106      CO2 15 (*)     Glucose 163 (*)     BUN 11      Creatinine 1.3      Calcium 8.9      Total Protein 7.1      Albumin 3.8      Total  Bilirubin 0.5      Alkaline Phosphatase 79      AST 24      ALT 18      eGFR >60.0      Anion Gap 16      Narrative:     Release to patient->Immediate   ISTAT PROCEDURE - Abnormal    POC PH 7.366      POC PCO2 32.6 (*)     POC PO2 52      POC HCO3 18.7 (*)     POC BE -7 (*)     POC SATURATED O2 86      POC Sodium 138      POC Potassium 4.2      POC TCO2 20 (*)     POC Ionized Calcium 1.10      POC Hematocrit 45      Sample VENOUS      Site Other      Allens Test N/A     POCT GLUCOSE - Abnormal    POCT Glucose 151 (*)    HIV 1 / 2 ANTIBODY    HIV 1/2 Ag/Ab Non-reactive      Narrative:     Release to patient->Immediate   HEPATITIS C ANTIBODY    Hepatitis C Ab Non-reactive      Narrative:     Release to patient->Immediate   POCT GLUCOSE MONITORING CONTINUOUS       EKG (independently interpreted by me):  Sinus rhythm.  Normal axis.  Normal intervals.  No STEMI    Chart reviewed.  Patient was history of neurocognitive disorder as well as autoimmune encephalitis    Imaging Results    None         Medications Given:  Medications   levETIRAcetam injection 1,000 mg (1,000 mg Intravenous Given 10/25/24 0336)           MDM:    44 y.o. male with seizure    Differential includes but is not limited to untreated epilepsy, electrolyte abnormality, hypoglycemia     Patient was modestly hyperglycemic.  He returned to baseline as expected after a few hours metabolizing the Versed.  Patient was still in modestly postictal state.  However, he was awake alert and oriented.  He was able to ambulate without difficulty.  He was able to tolerate fluids by mouth without difficulty.    Patient was distal history of autoimmune encephalitis.  I have re-initiated patient was Keppra at 750 mg b.i.d..  I have provided outpatient follow up with neurology clinic.  I have provided return precautions and cautions about swimming and/or driving until he was able to see the neurologist for further evaluation.  Patient was safe and stable for  discharge.      Medical Decision Making       Diagnostic Impression:    1. Seizures         ED Disposition Condition    Discharge           ED Prescriptions       Medication Sig Dispense Start Date End Date Auth. Provider    levETIRAcetam (KEPPRA) 750 MG Tab Take 1 tablet (750 mg total) by mouth 2 (two) times daily. 60 tablet 10/25/2024 10/25/2025 Refugio Macias MD          Follow-up Information    None         Patient and/or family understands the plan and is in agreement, verbalized understanding, questions answered    V See Macias MD  Resident  Emergency Medicine         Refugio Macias MD  Resident  10/25/24 5121

## 2024-10-28 ENCOUNTER — PATIENT MESSAGE (OUTPATIENT)
Dept: PSYCHIATRY | Facility: CLINIC | Age: 44
End: 2024-10-28
Payer: COMMERCIAL

## 2024-10-28 ENCOUNTER — PATIENT MESSAGE (OUTPATIENT)
Facility: CLINIC | Age: 44
End: 2024-10-28
Payer: COMMERCIAL

## 2024-11-06 ENCOUNTER — OFFICE VISIT (OUTPATIENT)
Dept: PSYCHIATRY | Facility: CLINIC | Age: 44
End: 2024-11-06
Payer: COMMERCIAL

## 2024-11-06 DIAGNOSIS — F33.1 MODERATE EPISODE OF RECURRENT MAJOR DEPRESSIVE DISORDER: ICD-10-CM

## 2024-11-06 DIAGNOSIS — F41.9 ANXIETY: Primary | ICD-10-CM

## 2024-11-06 PROCEDURE — G2211 COMPLEX E/M VISIT ADD ON: HCPCS | Mod: 95,,,

## 2024-11-06 PROCEDURE — 99214 OFFICE O/P EST MOD 30 MIN: CPT | Mod: 95,,,

## 2024-11-06 RX ORDER — SERTRALINE HYDROCHLORIDE 25 MG/1
TABLET, FILM COATED ORAL
Qty: 96 TABLET | Refills: 0 | Status: SHIPPED | OUTPATIENT
Start: 2024-11-06 | End: 2024-12-13

## 2024-11-06 NOTE — PROGRESS NOTES
The patient location is: [place], [place].    The chief complaint leading to consultation is: anxiety    Visit type: audiovisual    Face to Face time with patient: 30  60 minutes of total time spent on the encounter, which includes face to face time and non-face to face time preparing to see the patient (eg, review of tests), Obtaining and/or reviewing separately obtained history, Documenting clinical information in the electronic or other health record, Independently interpreting results (not separately reported) and communicating results to the patient/family/caregiver, or Care coordination (not separately reported).         Each patient to whom he or she provides medical services by telemedicine is:  (1) informed of the relationship between the physician and patient and the respective role of any other health care provider with respect to management of the patient; and (2) notified that he or she may decline to receive medical services by telemedicine and may withdraw from such care at any time.    Notes:      Outpatient Psychiatry Follow-Up Visit (MD/NP)    11/6/2024    Clinical Status of Patient:  Outpatient (Ambulatory)    Chief Complaint:  Jayesh Rose is a 44 y.o. male who presents today for follow-up of anxiety and memory problem.  Met with patient and spouse Lakesha.        Interval History and Content of Current Session:  Interim Events/Subjective Report/Content of Current Session:   Social/medical updates -- Last seen 5/29/2024. Patient was a no show for his in person visit at 0800 this morning. Called patient and his wife thought the appointment was for 0830. Appointment changed to virtual and conducted. Wife recently requested an appointment after her  had another seizure. She reported his anxiety is not adequately controlled.     Wife and patient participate in interview questions.     Wife reports about every 3-4 months her  gets these week long  "episodes kind of like de je cedric or auras. He can sense when this is coming on by symptoms of nausea first thing in the morning. He's has confusing thoughts and cannot understand if things are real or a dream. These sensations are so distressing they causes severe anxiety, nausea, and vomiting. Only once a seizure followed these symptoms. This time is very distressful and he is incapacitated during this week. Comes on like a panic attack and after a week of anxiety, nausea and vomiting it suddenly stops like nothing had aver happened.    Patient reports that he cannot pin point any particular stressor or fear to associate during the week of distress.     Wife reports she can handle his memory problems but her  needs help for these weeks of anxiety, nausea and vomiting.     Patient becomes tearful during interview when his wife is discussing the week he is incapacitated.      Currently still working; reports he can accomplish tasks on hand    Informed patient and his wife of portal messages through portal discussing availability of  earlier neurology appointment    Current psychotropic Medications:  Paroxetine (paxil) 20 mg--takes consistently  Trazodone 50 mg nightly    Previous Medication Trials:  Sertrraline--took long ago for depression and this was changed as requested by his wife when his seizures started  Lexapro--not sure why discontinued      Psychiatric Review of Systems (is patient experiencing or having changes in):    Mood -- "depressed"  Anxiety -- yes; feels like he is not capable of what he use to be capable of;   Attention --no; fine when involved in a task; memory is bad  Psychosis -- Denies A/V/H or paranoia  Sleep -- no trazodone helpful  Energy -- no  Appetite -- no    Access to a gun:Denies    Denies suicidal or homicidal ideations; He would never comitt suicde because he has a reposnibility to his wife and son.     AIMS-N/A    -N/A    Substance use: Drinks alcohol socially. Drinks a " "few beers few nights a week. Denies any problems with alcohol consumption. Uses a THC vape pen a few times a week and depends on his stress level.      5/29/2024:Social/medical update:Presents 31 minutes late for 30 minute appointment. States he got lost. He's alert, casually dressed, anxious mood and affect. Reports overall he is doing better. Reports less anxiety and he feels less "spacey". Reports he is working full time again for [employer]. Reports he struggles with being impatient and states he knows his condition has improved but not where he wants it to be.     Of note, patient does not have a typed note from his wife with notes about his condition and he is not carrying a notebook as on previous visit.     Pt reports  taking medications as prescribed except he does not take trazodone and he denies side effects of medications. States he is not taking trazodone due to wanting to hear and respond when his 3 y.o. son wakes up at night.    Endorses consistent depression symptoms over the past 2 weeks including decreased interest/motivation/pleasure/energy/appetite/concentration/sleep. States overall sleep is ok. Reports if it were not for his 3 y.o. child waking up during the night he would be sleeping well. Denies difficulty with interests and motivation. Endorses feelings of guilt. Overall energy level is decreased. Ability to concentrate is getting better but not ideal. Reports he carry's a notebook to take notes so that he does not ask a lot of the same questions over and over again as his place of employment. Currently appetite is ok. Denies any significant fluctuations in weight. Denies psychomotor retardation or agitation. Denies anhedonia.    Denies suicide/homicide ideations. Denies A/V/H.    Psychiatric History:    Denies any history of manic symptoms, including decreased need for sleep, increased energy, increased goal oriented behavior, risky behavior, racing thoughts.     Denies any history of " psychotic symptoms, including AVH, paranoia, thought insertion/broadcasting/withdrawal, delusions.     Endorses LUCI symptoms including excess worry, tension, insomnia. Denies panic attacks.     Denies history of PTSD symptoms including flashbacks, nightmares, hypervigilance.    Denies OCD and eating disorder symptoms.    Psychotherapy:  Target symptoms: anxiety   Why chosen therapy is appropriate versus another modality: relevant to diagnosis  Outcome monitoring methods: self-report, observation  Therapeutic intervention type: insight oriented psychotherapy, supportive psychotherapy  Topics discussed/themes: building skills sets for symptom management, symptom recognition  The patient's response to the intervention is accepting. The patient's progress toward treatment goals is good.   Duration of intervention: 6 minutes.    Review of Systems   PSYCHIATRIC: Pertinant items are noted in the narrative.  Past Medical, Family and Social History: The patient's past medical, family and social history have been reviewed and updated as appropriate within the electronic medical record - see encounter notes.    Compliance: yes    Side effects: None    Risk Parameters:  Patient reports no suicidal ideation  Patient reports no homicidal ideation  Patient reports no self-injurious behavior  Patient reports no violent behavior    Exam (detailed: at least 9 elements; comprehensive: all 15 elements)   Constitutional  Vitals:  Most recent vital signs, dated less than 90 days prior to this appointment, were reviewed.   There were no vitals filed for this visit.  There were no vitals taken for this visit.    General:  unremarkable, age appropriate, casually dressed, neatly groomed     Musculoskeletal  Muscle Strength/Tone:  not examined   Gait & Station:  Not examined     Psychiatric  Speech:  no latency; no press, spontaneous   Mood & Affect:  euthymic  mood-congruent   Thought Process:  goal-directed, logical   Associations:  intact    Thought Content:  normal, no suicidality, no homicidality, delusions, or paranoia   Insight:  intact, has awareness of illness   Judgement: behavior is adequate to circumstances   Orientation:  grossly intact   Memory: intact for content of interview   Language: grossly intact   Attention Span & Concentration:  able to focus   Fund of Knowledge:  intact and appropriate to age and level of education     Assessment and Diagnosis   Status/Progress: Based on the examination today, the patient's problem(s) is/are inadequately controlled.  New problems have not been presented today.   Co-morbidities are complicating management of the primary condition.  There are no active rule-out diagnoses for this patient at this time.     General Impression: 43 y.o. male with a history of anxiety, panic attacks, and memory problems since encephalopathy and seizure May 2023. Previous trial of lexpro ineffective in controlling anxiety symptoms. Reports feeling paxil has been helpful and spoke to patient's neurologist who stated it is not contraindicated. Patient agreeable to no changes to medications/doses currently. Of note, assisted patient in finding his vehicle by looking at a picture he took before getting out of his car for this appointment. 11/6/2024: Currently symptoms of anxiety and depression inadequately controlled. He and his wife are agreeable to discontinue paxil and trial sertraline.     1. Anxiety  sertraline (ZOLOFT) 25 MG tablet      2. Moderate episode of recurrent major depressive disorder  sertraline (ZOLOFT) 25 MG tablet       Visit today included increased complexity associated with the care of the episodic problem MDD, anxiety addressed and managing the longitudinal care of the patient due to the serious and/or complex managed problem(s).     Intervention/Counseling/Treatment Plan   Medication Management: Continue current medications. The risks and benefits of medication were discussed with the patient.  Labs,  Diagnostic Studies: reviewed   Discussed with patient informed consent including diagnosis, risks and benefits of proposed treatment above vs. alternative treatments vs. no treatment, as well as serious and common side effects of these treatments, and the inherent unpredictability of individual responses to these treatments. The patient expresses understanding of the above and displays the capacity to agree with this current plan. Patient also agrees that, currently, the benefits outweigh the risks and would like to pursue treatment at this time, and had no other questions..  Discontinue paroxetine (paxil) 20 MG tablet; Take 0.5 tablet (10 mg) daily times 5 days; THEN take 0.5 tablet (10 mg) every other day times 5 days; THEN discontinue.  Start (when starting paxil wean) sertraline (zoloft) 25 mg tablet; take 1 tablet (25 mg) daily times 5 days; THEN take 2 tablets (50 mg) daily times 5 days; THEN take 3 tablets (75 mg) daily to target anxiety and depression  Continue trazodone (desyrel) 50 MG; Take 1/2-1 tablet (25-50 mg) every night; as needed to target depression and off label insomnia  Keep all follow up appointments  Attend all neurology appointments as scheduled    Safety: Patient has been educated on safety plan should any SI/HI, medication side effects &/or emergency arise. Patient verbalized understanding and agreement to contact a trusted friend or loved one, call 911 or go to nearest ER should any emergency occur.     Return to Clinic: 1 month    VIRA Marshall

## 2024-11-07 ENCOUNTER — PATIENT MESSAGE (OUTPATIENT)
Dept: PSYCHIATRY | Facility: CLINIC | Age: 44
End: 2024-11-07
Payer: COMMERCIAL

## 2024-11-17 NOTE — PROGRESS NOTES
[facility] - NEUROLOGY [address]TH [address] [facility], [place]    Date: 11/20/24  Patient Name: Jayesh Rose   MRN: [id number]   PCP: Du Shaw  Referring Provider: No ref. provider found    Assessment:   Jayesh Rose is a 44 y.o. male presenting for outpatient AI encephalitis follow up. Prior AI encephalitis serology has been negative. However, patient has been off AED for more than one year, but was recently admitted for breakthrough seizures, and yet has not been taking the LEV at home. No EEG was done on that admission back in October. After thorough review, he has had a couple of EEGs with one left temporal electrographic seizure captured. 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     Current presentation may be related with temporal lobe epilepsy with somatic (aura-like symptoms) and neurobehavioral symptoms (visual and tactile hallucinations) that needs to be worked up and optimized. This anatomic/organic alteration may be the cause of his encephalitis presentation. Of note, he is being on close follow ups with neuropsychology.     Plan:     - Restart LEV with increased dose at 1g BID (previously at 750 BID)  - Klonopin wafers 0.25mg PRN (wife instructed to give in if GTC > 2 mins)  - Brain MRI epilepsy protocol   - CT-CAP for malignancy workup   - Ambulatory EEG   - Repeat serum autoimmune labs   - RTC in 3 months     Problem List Items Addressed This Visit          ID    Autoimmune encephalitis    Relevant Orders    Encephalopathy Autoimmune Eval    CT Chest Abdomen Pelvis With IV Contrast (XPD) Routine Oral Contrast     Other Visit Diagnoses       Seizures    -  Primary    Relevant Medications    clonazePAM (KLONOPIN) 0.25 MG TbDL    Other Relevant Orders    Ambulatory EEG monitoring    Convulsions, unspecified convulsion type        Relevant Orders    MRI Brain W WO Contrast    Temporal lobe epilepsy                Kishor  "MD Ulysses    Subjective:     Interval history 11/20/24: Her for clinic Possible AI encephalitis. Had a 10 minute seizure (10/25/24); has been off Keppra since December 2023 per Wife in the room since he has been seizure free for more than one year and was instructed that he could be off the medication. However, back in October of this year, was admitted for seizure like activity with bilateral UE stiffening leading to an episode of GTC. No EEG was done on that admission, since patient was loaded with 2g IV LEV with return to baseline.     On close chart review, he had a couple of EEGs done during last admission in May, when the AI encephalopathy workup was done. A left temporal electrographic seizure was captured. In addition to brain MRI with bilateral MTS. Has been followed up closely with neuropsych for his persistent neurocognitive problems (memory deficits).     Seizure hx: premature birth  First seizure: 10/18/20, described as "convulsing" (motor/foaming/right side of face paralyzed) triggered by stress  Previously on , 1000 and 1500 without proper compliance.      Interval history 12/20/23:   Mr. Jayesh Rose is a 44 y.o. male presenting for encephalitis follow up. Since last seen in clinic, we repeated an MRI (7/14) which showed improvement of his lesion. He saw neuropsychology as well: "Results predominantly showed significant impairment with memory (verbal and visual but visual was somewhat better). Also, findings noted increased trouble with visual-spatial skills (nonverbal reasoning, visual perception) and  increased variability with attention/working memory. Otherwise, scores were largely within expectations for verbal skills, most executive functions, mental speed, basic attention, and language measures." Since then he has noticed significant improvement in his memory (its been 6 months). He started having episodes of panic attacks which started after starting a SSRI but later " improved after changing to a different SSRI. He did speech therapy with mild improvement. He is here by himself, wife helped with history taking over the phone.     HPI 6/14/23:   Mr. Jayesh Rose is a 42 y.o. male presenting hospital follow up for encephalitis. He has a history of seizures and anxiety. 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 He is here for hospital follow up of presumptive autoimmune encephalitis. He presented to [facility] around tucker ago after a GTC. In the ER him and his wife stated that he was also having some confusion and myalgias. WBC was elevated and there was some questionable neck pain. CT head unremarkable. LP done due to concerns for meningitis. CSF studies non specific so neurology was consulted. MRI brain w wo showed diffusion restriction and T2 flair hyper intensities on bilateral temporal lobes. Repeat LP ruled out infection and he was started on IVIG. He completed 5 days with mild subjective improvement while inpatient and was dc on day 5. Since then, there has been mild-mod improvement. Wife reached out to me last week and after discussing with Dr Berumen, we prescribed 3 days of high dose steroids. He is on day 2. Mild improvement noted     [facility]  Brain MRI (12/20/23): T2-FLAIR hyperintensity within the bilateral mesial temporal lobes.  Subtle relative expansion of the bilateral temporal horns compared to initial imaging of 05/19/2023, may reflect resolution of prior edema versus atrophic change in this region.     No new intracranial abnormality identified elsewhere.    PAST MEDICAL HISTORY:  Past Medical History:   Diagnosis Date    Anxiety 05/18/2023    Class 1 obesity in adult 05/18/2023    Depression     GERD (gastroesophageal reflux disease) 01/10/2019    Seizures        PAST SURGICAL HISTORY:  No past surgical history on  file.    CURRENT MEDS:  Current Outpatient Medications   Medication Sig Dispense Refill    sertraline (ZOLOFT) 25 MG tablet Take 1 tablet (25 mg total) by mouth once daily for 5 days, THEN 2 tablets (50 mg total) once daily for 5 days, THEN 3 tablets (75 mg total) once daily for 27 days. 96 tablet 0    traZODone (DESYREL) 50 MG tablet Take 1 tablet (50 mg total) by mouth every evening. 30 tablet 1    clonazePAM (KLONOPIN) 0.25 MG TbDL Take 1 tablet (0.25 mg total) by mouth 2 (two) times daily as needed (For rescue if GTC > 2 mins). 60 tablet 5    guaifenesin (MUCINEX ORAL) Take 1 tablet by mouth 2 (two) times daily as needed (allergies). (Patient not taking: Reported on 11/20/2024)      ibuprofen (ADVIL,MOTRIN) 200 MG tablet Take 400-800 mg by mouth 2 (two) times daily as needed for Pain. (Patient not taking: Reported on 11/20/2024)      levETIRAcetam (KEPPRA) 1000 MG tablet Take 1 tablet (1,000 mg total) by mouth 2 (two) times daily. 60 tablet 11    modafiniL (PROVIGIL) 100 MG Tab Take 1 tablet (100 mg total) by mouth once daily. (Patient not taking: Reported on 11/20/2024) 30 tablet 3    multivitamin (THERAGRAN) per tablet Take 1 tablet by mouth once daily. (Patient not taking: Reported on 11/20/2024)      omeprazole (PRILOSEC OTC) 20 MG tablet Take 20 mg by mouth daily as needed (heartburn). (Patient not taking: Reported on 11/20/2024)       No current facility-administered medications for this visit.       ALLERGIES:  Review of patient's allergies indicates:  No Known Allergies    FAMILY HISTORY:  Family History   Problem Relation Name Age of Onset    Mental illness Mother      Bipolar disorder Mother      Drug abuse Mother      Heart disease Father      Mental illness Father      Depression Father      Drug abuse Father      Heart disease Brother      Depression Brother      Suicide Brother      Parkinsonism Brother      Cancer Maternal Grandfather      Cancer Paternal Grandfather         SOCIAL  HISTORY:  Social History     Tobacco Use    Smoking status: Never     Passive exposure: Never    Smokeless tobacco: Never   Substance Use Topics    Alcohol use: Yes     Comment: Occasional with his wife on date night    Drug use: No       Review of Systems:  12 system review of systems is negative except for the symptoms mentioned in HPI.      Objective:     Vitals:    11/20/24 1409   BP: 134/85   BP Location: Left arm   Patient Position: Sitting   Pulse: 68       General: NAD, well nourished   Eyes: no tearing, discharge, no erythema   ENT: moist mucous membranes of the oral cavity, nares patent    Neck: Supple, full range of motion  Cardiovascular: Warm and well perfused, pulses equal and symmetrical  Lungs: Normal work of breathing, normal chest wall excursions  Skin: No rash, lesions, or breakdown on exposed skin  Psychiatry: Mood and affect are appropriate   Abdomen: soft, non tender, non distended  Extremeties: No cyanosis, clubbing or edema.    Neurological   MENTAL STATUS: Alert and oriented to person, place, and time. Attention and concentration within normal limits. Speech without dysarthria, able to name and repeat without difficulty. Remote memory and recent memory impaired (recalled 1 word with no cue, 2 with cues).   CRANIAL NERVES: Visual fields intact. PERRL. EOMI. Facial sensation intact. Face symmetrical. Hearing grossly intact. Full shoulder shrug bilaterally. Tongue protrudes midline   SENSORY: Sensation is intact to pin throughout.  Joint position perception intact. Negative Romberg.   MOTOR: Normal bulk and tone. No pronator drift.  5/5 deltoid, biceps, triceps, interosseous, hand  bilaterally. 5/5 iliopsoas, knee extension/flexion, foot dorsi/plantarflexion bilaterally.    REFLEXES: Symmetric and 3+ throughout. Toes down going bilaterally. Bilateral campbell   CEREBELLAR/COORDINATION/GAIT: Gait steady with normal arm swing and stride length.  Heel to shin intact. Finger to nose intact.  Normal rapid alternating movements.

## 2024-11-20 ENCOUNTER — OFFICE VISIT (OUTPATIENT)
Dept: NEUROLOGY | Facility: CLINIC | Age: 44
End: 2024-11-20
Payer: COMMERCIAL

## 2024-11-20 VITALS — DIASTOLIC BLOOD PRESSURE: 85 MMHG | SYSTOLIC BLOOD PRESSURE: 134 MMHG | HEART RATE: 68 BPM

## 2024-11-20 DIAGNOSIS — G40.109 TEMPORAL LOBE EPILEPSY: ICD-10-CM

## 2024-11-20 DIAGNOSIS — G04.81 AUTOIMMUNE ENCEPHALITIS: ICD-10-CM

## 2024-11-20 DIAGNOSIS — R56.9 SEIZURES: Primary | ICD-10-CM

## 2024-11-20 DIAGNOSIS — R56.9 CONVULSIONS, UNSPECIFIED CONVULSION TYPE: ICD-10-CM

## 2024-11-20 DIAGNOSIS — G93.81 MESIAL TEMPORAL SCLEROSIS: ICD-10-CM

## 2024-11-20 PROCEDURE — 99999 PR PBB SHADOW E&M-EST. PATIENT-LVL III: CPT | Mod: PBBFAC,,,

## 2024-11-20 RX ORDER — LEVETIRACETAM 1000 MG/1
1000 TABLET ORAL 2 TIMES DAILY
Qty: 60 TABLET | Refills: 11 | Status: SHIPPED | OUTPATIENT
Start: 2024-11-20 | End: 2025-11-20

## 2024-11-20 RX ORDER — CLONAZEPAM 0.25 MG/1
0.25 TABLET, ORALLY DISINTEGRATING ORAL 2 TIMES DAILY PRN
Qty: 60 TABLET | Refills: 5 | Status: SHIPPED | OUTPATIENT
Start: 2024-11-20 | End: 2025-11-15

## 2024-12-02 ENCOUNTER — OFFICE VISIT (OUTPATIENT)
Dept: PSYCHIATRY | Facility: CLINIC | Age: 44
End: 2024-12-02
Payer: COMMERCIAL

## 2024-12-02 DIAGNOSIS — F41.9 ANXIETY: ICD-10-CM

## 2024-12-02 DIAGNOSIS — F33.1 MODERATE EPISODE OF RECURRENT MAJOR DEPRESSIVE DISORDER: Primary | ICD-10-CM

## 2024-12-02 PROCEDURE — 1160F RVW MEDS BY RX/DR IN RCRD: CPT | Mod: CPTII,95,,

## 2024-12-02 PROCEDURE — G2211 COMPLEX E/M VISIT ADD ON: HCPCS | Mod: 95,,,

## 2024-12-02 PROCEDURE — 99214 OFFICE O/P EST MOD 30 MIN: CPT | Mod: 95,,,

## 2024-12-02 PROCEDURE — 1159F MED LIST DOCD IN RCRD: CPT | Mod: CPTII,95,,

## 2024-12-02 RX ORDER — SERTRALINE HYDROCHLORIDE 100 MG/1
100 TABLET, FILM COATED ORAL DAILY
Qty: 90 TABLET | Refills: 0 | Status: SHIPPED | OUTPATIENT
Start: 2024-12-02

## 2024-12-02 NOTE — PROGRESS NOTES
"The patient location is: [facility] radio    The chief complaint leading to consultation is: anxiety    Visit type: audiovisual    Face to Face time with patient: 11   minutes of total time spent on the encounter, which includes face to face time and non-face to face time preparing to see the patient (eg, review of tests), Obtaining and/or reviewing separately obtained history, Documenting clinical information in the electronic or other health record, Independently interpreting results (not separately reported) and communicating results to the patient/family/caregiver, or Care coordination (not separately reported).         Each patient to whom he or she provides medical services by telemedicine is:  (1) informed of the relationship between the physician and patient and the respective role of any other health care provider with respect to management of the patient; and (2) notified that he or she may decline to receive medical services by telemedicine and may withdraw from such care at any time.    Notes:      Outpatient Psychiatry Follow-Up Visit (MD/NP)    12/2/2024    Clinical Status of Patient:  Outpatient (Ambulatory)    Chief Complaint:  Jayesh Rose is a 44 y.o. male who presents today for follow-up of anxiety and memory problem.  Met with patient and spouse Lakesha.        Interval History and Content of Current Session:  Interim Events/Subjective Report/Content of Current Session:   Social/medical updates --Feels like his cognition is getting better but cannot tell what is helping with this. States he is using less sticky note reminders.     States he does not recall any difficulties transitioning from paxil to sertraline. Unable to confirm dose of sertraline currently taking and states his wife reema's his medications. He feels she followed ramp instructions as ordered.     States he continues to have "sadness" in the evenings and mornings but "nothing major". Reports taking medications as prescribed and " "he feels like they are working. He denies any side effects. He agrees an increase in dose would be helpful and states "my wife will be happy with that".    Being followed by neurology and neuropsychology. Last seen by neurology 11/202/2024:  Plan:      - Restart LEV with increased dose at 1g BID (previously at 750 BID)  - Klonopin wafers 0.25mg PRN (wife instructed to give in if GTC > 2 mins)  - Brain MRI epilepsy protocol   - CT-CAP for malignancy workup   - Ambulatory EEG   - Repeat serum autoimmune labs   - RTC in 3 months     Unable to inform writer if recent ordered MRI or CT has been completed. Per chart these have not been done to date.     Current psychotropic Medications:  Trazodone 50 mg nightly--not taking due to his sons sleep schedule  Initiated Sertraline 75 mg--taking consistently and feels this is helpful-denies any side effects    Previous Medication Trials:  Lexapro--not sure why discontinued  D/C Paroxetine (paxil)--neurocognitive disorders     Psychiatric Review of Systems (is patient experiencing or having changes in):    Mood -- depression comes from lack of memory; don't feel complete; hard to regulate his mood in the morning and night; just sadness; no rage or acting out  Anxiety -- a lot better  Attention -- no  Psychosis -- denies A/V/H or paranoia  Sleep -- altered due to his "sons sleep patterns and weird work schedule"  Energy -- no; tired from work and sleep schedule; states "nothing to do with medications"  Appetite -- no    Access to a gun:Denies    Denies suicidal or homicidal ideations    AIMS-N/A    -No concerns    Substance use: Alcohol "socially". Denies any problems with alcohol consumption. Occasionally vapes marijuana.       11/6/2024:Social/medical updates -- Last seen 5/29/2024. Patient was a no show for his in person visit at 0800 this morning. Called patient and his wife thought the appointment was for 0830. Appointment changed to virtual and conducted. Wife recently " "requested an appointment after her  had another seizure. She reported his anxiety is not adequately controlled.     Wife and patient participate in interview questions.     Wife reports about every 3-4 months her  gets these week long episodes kind of like de je cedric or auras. He can sense when this is coming on by symptoms of nausea first thing in the morning. He's has confusing thoughts and cannot understand if things are real or a dream. These sensations are so distressing they causes severe anxiety, nausea, and vomiting. Only once a seizure followed these symptoms. This time is very distressful and he is incapacitated during this week. Comes on like a panic attack and after a week of anxiety, nausea and vomiting it suddenly stops like nothing had aver happened.    Patient reports that he cannot pin point any particular stressor or fear to associate during the week of distress.     Wife reports she can handle his memory problems but her  needs help for these weeks of anxiety, nausea and vomiting.     Patient becomes tearful during interview when his wife is discussing the week he is incapacitated.      Currently still working; reports he can accomplish tasks on hand    Informed patient and his wife of portal messages through portal discussing availability of  earlier neurology appointment        Psychiatric Review of Systems (is patient experiencing or having changes in):    Mood -- "depressed"  Anxiety -- yes; feels like he is not capable of what he use to be capable of;   Attention --no; fine when involved in a task; memory is bad  Psychosis -- Denies A/V/H or paranoia  Sleep -- no trazodone helpful  Energy -- no  Appetite -- no    Access to a gun:Denies    Denies suicidal or homicidal ideations; He would never comitt suicde because he has a reposnibility to his wife and son.     AIMS-N/A    -N/A    Substance use: Drinks alcohol socially. Drinks a few beers few nights a week. Denies any " "problems with alcohol consumption. Uses a THC vape pen a few times a week and depends on his stress level.      5/29/2024:Social/medical update:Presents 31 minutes late for 30 minute appointment. States he got lost. He's alert, casually dressed, anxious mood and affect. Reports overall he is doing better. Reports less anxiety and he feels less "spacey". Reports he is working full time again for [employer]. Reports he struggles with being impatient and states he knows his condition has improved but not where he wants it to be.     Of note, patient does not have a typed note from his wife with notes about his condition and he is not carrying a notebook as on previous visit.     Pt reports  taking medications as prescribed except he does not take trazodone and he denies side effects of medications. States he is not taking trazodone due to wanting to hear and respond when his 3 y.o. son wakes up at night.    Endorses consistent depression symptoms over the past 2 weeks including decreased interest/motivation/pleasure/energy/appetite/concentration/sleep. States overall sleep is ok. Reports if it were not for his 3 y.o. child waking up during the night he would be sleeping well. Denies difficulty with interests and motivation. Endorses feelings of guilt. Overall energy level is decreased. Ability to concentrate is getting better but not ideal. Reports he carry's a notebook to take notes so that he does not ask a lot of the same questions over and over again as his place of employment. Currently appetite is ok. Denies any significant fluctuations in weight. Denies psychomotor retardation or agitation. Denies anhedonia.    Denies suicide/homicide ideations. Denies A/V/H.    Psychiatric History:    Denies any history of manic symptoms, including decreased need for sleep, increased energy, increased goal oriented behavior, risky behavior, racing thoughts.     Denies any history of psychotic symptoms, including AVH, paranoia, " thought insertion/broadcasting/withdrawal, delusions.     Endorses LUCI symptoms including excess worry, tension, insomnia. Denies panic attacks.     Denies history of PTSD symptoms including flashbacks, nightmares, hypervigilance.    Denies OCD and eating disorder symptoms.    Psychotherapy:  Target symptoms: depression, anxiety   Why chosen therapy is appropriate versus another modality: relevant to diagnosis  Outcome monitoring methods: self-report, observation  Therapeutic intervention type: insight oriented psychotherapy, supportive psychotherapy  Topics discussed/themes: building skills sets for symptom management, symptom recognition  The patient's response to the intervention is accepting. The patient's progress toward treatment goals is good.   Duration of intervention: 6 minutes.    Review of Systems   PSYCHIATRIC: Pertinant items are noted in the narrative.  Past Medical, Family and Social History: The patient's past medical, family and social history have been reviewed and updated as appropriate within the electronic medical record - see encounter notes.    Compliance: yes    Side effects: None    Risk Parameters:  Patient reports no suicidal ideation  Patient reports no homicidal ideation  Patient reports no self-injurious behavior  Patient reports no violent behavior    Exam (detailed: at least 9 elements; comprehensive: all 15 elements)   Constitutional  Vitals:  Most recent vital signs, dated less than 90 days prior to this appointment, were reviewed.   There were no vitals filed for this visit.  There were no vitals taken for this visit.    General:  unremarkable, age appropriate, casually dressed, neatly groomed     Musculoskeletal  Muscle Strength/Tone:  not examined   Gait & Station:  Not examined     Psychiatric  Speech:  no latency; no press, spontaneous   Mood & Affect:  euthymic  mood-congruent   Thought Process:  goal-directed, logical   Associations:  intact   Thought Content:  normal, no  suicidality, no homicidality, delusions, or paranoia   Insight:  intact, has awareness of illness   Judgement: behavior is adequate to circumstances   Orientation:  grossly intact   Memory: intact for content of interview   Language: grossly intact   Attention Span & Concentration:  able to focus   Fund of Knowledge:  intact and appropriate to age and level of education     Assessment and Diagnosis   Status/Progress: Based on the examination today, the patient's problem(s) is/are inadequately controlled.  New problems have not been presented today.   Co-morbidities are complicating management of the primary condition.  There are no active rule-out diagnoses for this patient at this time.     General Impression: 43 y.o. male with a history of anxiety, panic attacks, and memory problems since encephalopathy and seizure May 2023. Previous trial of lexpro ineffective in controlling anxiety symptoms. Reports feeling paxil has been helpful and spoke to patient's neurologist who stated it is not contraindicated. Patient agreeable to no changes to medications/doses currently. Of note, assisted patient in finding his vehicle by looking at a picture he took before getting out of his car for this appointment. 11/6/2024: Currently symptoms of anxiety and depression inadequately controlled. He and his wife are agreeable to discontinue paxil and trial sertraline. 12/2/2024: Currently symptoms of depression are inadequately controlled. Feels like anxiety is adequately controlled. He is agreeable to increase sertraline.     1. Moderate episode of recurrent major depressive disorder        2. Anxiety           Visit today included increased complexity associated with the care of the episodic problem MDD, anxiety addressed and managing the longitudinal care of the patient due to the serious and/or complex managed problem(s).     Intervention/Counseling/Treatment Plan   Medication Management: Continue current medications. The risks  and benefits of medication were discussed with the patient.  Labs, Diagnostic Studies: No new labs since previous visit to review    Increase sertraline (zoloft) 100 mg tablet; take 1 tablet (100 mg) daily to target anxiety and depression  Continue trazodone (desyrel) 50 MG; Take 1/2-1 tablet (25-50 mg) every night; as needed to target depression and off label insomnia  Keep all follow up appointments  Attend all neurology and neuropsychology appointments as scheduled    Safety: Patient has been educated on safety plan should any SI/HI, medication side effects &/or emergency arise. Patient verbalized understanding and agreement to contact a trusted friend or loved one, call 911 or go to nearest ER should any emergency occur.     Return to Clinic: 2 months    VIRA Marshall

## 2024-12-07 ENCOUNTER — LAB VISIT (OUTPATIENT)
Dept: LAB | Facility: HOSPITAL | Age: 44
End: 2024-12-07
Payer: COMMERCIAL

## 2024-12-07 DIAGNOSIS — G04.81 AUTOIMMUNE ENCEPHALITIS: ICD-10-CM

## 2024-12-07 PROCEDURE — 36415 COLL VENOUS BLD VENIPUNCTURE: CPT | Mod: PO

## 2024-12-07 PROCEDURE — 86255 FLUORESCENT ANTIBODY SCREEN: CPT | Mod: 59

## 2024-12-13 ENCOUNTER — PATIENT MESSAGE (OUTPATIENT)
Dept: NEUROLOGY | Facility: CLINIC | Age: 44
End: 2024-12-13
Payer: COMMERCIAL

## 2024-12-17 ENCOUNTER — PATIENT MESSAGE (OUTPATIENT)
Dept: NEUROLOGY | Facility: CLINIC | Age: 44
End: 2024-12-17
Payer: COMMERCIAL

## 2024-12-20 ENCOUNTER — HOSPITAL ENCOUNTER (OUTPATIENT)
Dept: RADIOLOGY | Facility: HOSPITAL | Age: 44
Discharge: HOME | End: 2024-12-20
Payer: COMMERCIAL

## 2024-12-20 DIAGNOSIS — G04.81 AUTOIMMUNE ENCEPHALITIS: ICD-10-CM

## 2024-12-20 DIAGNOSIS — R56.9 CONVULSIONS, UNSPECIFIED CONVULSION TYPE: ICD-10-CM

## 2024-12-20 PROCEDURE — 25500020 PHARM REV CODE 255

## 2024-12-20 PROCEDURE — A9585 GADOBUTROL INJECTION: HCPCS

## 2024-12-20 PROCEDURE — 70553 MRI BRAIN STEM W/O & W/DYE: CPT | Mod: TC

## 2024-12-20 PROCEDURE — 70553 MRI BRAIN STEM W/O & W/DYE: CPT | Mod: 26,,, | Performed by: RADIOLOGY

## 2024-12-20 RX ORDER — GADOBUTROL 604.72 MG/ML
9 INJECTION INTRAVENOUS
Status: COMPLETED | OUTPATIENT
Start: 2024-12-20 | End: 2024-12-20

## 2024-12-20 RX ADMIN — GADOBUTROL 9 ML: 604.72 INJECTION INTRAVENOUS at 03:12

## 2025-01-02 RX ORDER — TRAZODONE HYDROCHLORIDE 50 MG/1
50 TABLET ORAL NIGHTLY
Qty: 30 TABLET | Refills: 1 | Status: SHIPPED | OUTPATIENT
Start: 2025-01-02

## 2025-01-17 ENCOUNTER — HOSPITAL ENCOUNTER (OUTPATIENT)
Dept: NEUROLOGY | Facility: CLINIC | Age: 45
Discharge: HOME | End: 2025-01-17
Payer: COMMERCIAL

## 2025-01-17 DIAGNOSIS — R56.9 SEIZURES: ICD-10-CM

## 2025-01-17 PROCEDURE — 95816 EEG AWAKE AND DROWSY: CPT | Mod: S$GLB,,, | Performed by: STUDENT IN AN ORGANIZED HEALTH CARE EDUCATION/TRAINING PROGRAM

## 2025-01-17 NOTE — PROCEDURES
VIDEO ELECTROENCEPHALOGRAM  REPORT    DATE OF SERVICE:1/17/25  EEG NUMBER: OC   REQUESTED BY:  Dr Arroa  LOCATION OF SERVICE:  outpatient    METHODOLOGY   Electroencephalographic (EEG) recording is with electrodes placed according to the International 10-20 placement system.  Thirty two (32) channels of digital signal (sampling rate of 512/sec) including T1 and T2 was simultaneously recorded from the scalp and may include  EKG, EMG, and/or eye monitors.  Recording band pass was 0.1 to 512 hz.  Digital video recording of the patient is simultaneously recorded with the EEG.  The patient is instructed report clinical symptoms which may occur during the recording session.  EEG and video recording is stored and archived in digital format.  Activation procedures which include photic stimulation, hyperventilation and instructing patients to perform simple task are done in selected patients.   The EEG is displayed on a monitor screen and can be reviewed using different montages.  Computer assisted analysis is employed to detect spike and electrographic seizure activity.   The entire record is submitted for computer analysis.  The entire recording is visually reviewed and the times identified by computer analysis as being spikes or seizures are reviewed again.  Compresses spectral analysis (CSA) is also performed on the activity recorded from each individual channel.  This is displayed as a power display of frequencies from 0 to 30 Hz over time.   The CSA is reviewed looking for asymmetries in power between homologous areas of the scalp and then compared with the original EEG recording.     [employer] software was also utilized in the review of this study.  This software suite analyzes the EEG recording in multiple domains.  Coherence and rhythmicity is computed to identify EEG sections which may contain organized seizures.  Each channel undergoes analysis to detect presence of spike and sharp waves which have  special and morphological characteristic of epileptic activity.  The routine EEG recording is converted from spacial into frequency domain.  This is then displayed comparing homologous areas to identify areas of significant asymmetry.  Algorithm to identify non-cortically generated artifact is used to separate eye movement, EMG and other artifact from the EEG.      ELECTROENCEPHALOGRAM:    Indication: 44 year old male with history of autoimmune encephalitis and seizures presenting for EEG to evaluate for epileptiform activity.      State of Consciousness:   Awake and drowsy    Background:   The background is well organized, symmetric and continuous.  There is a normal anterior to posterior gradient  with a well developed 9-10 Hz posterior dominant rhythm seen at maximum alertness.  There is a moderately excessive amount of beta activity seen. Intermittent polymorphic delta range slowing of the left and right temporal regions is sometimes noted.    Sleep:   Transition into stage 1 sleep is characterized by attenuation of the posterior dominant rhythm, bilateral theta activity, and vertex waves noted. The patient does not enter stage 2 sleep    Epileptiform Abnormalities  None    Seizures/Events:   None    EKG:   Regular rate and rhythm on single lead EKG    Activating procedures:   - Hyperventilation: good effort, no abnormalities elicited   - Photic stimulation: no photic driving, no epileptiform discharges elicited     Impression:   Abnormal study due to intermittent slowing in both the left and right temporal regions.  This is nonspecific in regards to etiology and indicative of focal cortical dysfunction in this region.  No epileptiform activity is noted.        Jackelyn Ordonez MD  [facility]   Department of Neurology/Epilepsy

## 2025-01-26 ENCOUNTER — PATIENT MESSAGE (OUTPATIENT)
Dept: NEUROLOGY | Facility: CLINIC | Age: 45
End: 2025-01-26
Payer: COMMERCIAL

## 2025-01-31 ENCOUNTER — TELEPHONE (OUTPATIENT)
Dept: NEUROLOGY | Facility: CLINIC | Age: 45
End: 2025-01-31
Payer: COMMERCIAL

## 2025-01-31 NOTE — TELEPHONE ENCOUNTER
----- Message from Zuly sent at 1/30/2025  8:25 AM CST -----  Spouse calling in regards to pt medication , can clinic call spouse back at number below       [phone] spouse Lakesha

## 2025-02-12 ENCOUNTER — PATIENT MESSAGE (OUTPATIENT)
Dept: NEUROLOGY | Facility: CLINIC | Age: 45
End: 2025-02-12
Payer: COMMERCIAL

## 2025-02-18 NOTE — PROGRESS NOTES
[facility] - NEUROLOGY [address]TH [address] [facility], [place]    Date: 2/19/25  Patient Name: Jayesh Rose   MRN: [id number]   PCP: Du Shaw  Referring Provider: No ref. provider found    Assessment:   Jayesh Rose is a 44 y.o. male presenting for 3 month follow up for questionable AI encephalitis. Repeated AI encephalitis serology remains negative. Restarted on max dose of LEV in addition to some PRN Klonopin for breakthrough seizures.     Noted epilepsy workup until now:   MRI Epilepsy (12/20/25): advanced volume loss of the hippocampal formations, there is the suspicion of increased signal likely representing underlying gliosis/mesial temporal sclerosis in light of prior imaging studies.  2. Ambulatory EEG (1/17/25): intermittent slowing in both the left and right temporal regions.  This is nonspecific in regards to etiology and indicative of focal cortical dysfunction.    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 This presentation correlates with ancillary epilepsy workup (e.g., EEG, MRI). Commented to patient and wife that he is a good candidate for EMU admission to better characterize his events, since confusional episodes tend to happen mostly in the mornings. In the meantime, and while he gets admitted to the EMU, a second AED has been added.     Plan:     -Continue LEV 2g BID; already maxed out on LEV   -Start  BID   -Klonopin 0.5 PRN (wife instructed to give in if GTC > 2 mins)  -EMU admission (to better characterize episodes); already had the Epilepsy workup done - orders placed  -Repeat CT-CAP for malignancy workup (previously ordered November 2024)   -Cognitive rehab (Referrals sent through speech therapy)   -RTC in 3 months after EMU admission   -Seizure/loss of consciousness restrictions are but not limited to: no driving for six months after last  "seizure; avoid swimming, high altitude activities, operating heavy machinery, bathing unattended, or engaging in activities in where a seizure/loss of consciousness will cause harm to self or others.    Problem List Items Addressed This Visit          ID    Autoimmune encephalitis    Relevant Orders    CT Chest Abdomen Pelvis With IV Contrast (XPD) Routine Oral Contrast     Other Visit Diagnoses         Seizures    -  Primary    Relevant Medications    lacosamide (VIMPAT) 100 mg Tab    Other Relevant Orders    CT Chest Abdomen Pelvis With IV Contrast (XPD) Routine Oral Contrast    EMU Monitoring      Memory deficit        Relevant Orders    Ambulatory consult to Speech Therapy          Kishor Arora MD    Subjective:     Interval history 2/19/25: Patient feeling better. Wife has been using the Klonopin 0.25 for about 10 times with the breakthrough symptoms (at the moment of follow up they have not used yet the Klonopin 0.5; only the 0.25). 2 weeks ago wife saying he had an episode of "big confusion" and slept for almost 2 days straight after the event. MRI Epilepsy (12/20/25): advanced volume loss of the hippocampal formations, there is the suspicion of increased signal likely representing underlying gliosis/mesial temporal sclerosis in light of prior imaging studies. Ambulatory EEG (1/17/25) : intermittent slowing in both the left and right temporal regions.  This is nonspecific in regards to etiology and indicative of focal cortical dysfunction. See "Seizure history" for more details.    Interval history 11/20/24: Her for clinic Possible AI encephalitis. Had a 10 minute seizure (10/25/24); has been off Keppra since December 2023 per Wife in the room since he has been seizure free for more than one year and was instructed that he could be off the medication. However, back in October of this year, was admitted for seizure like activity with bilateral UE stiffening leading to an episode of GTC. No EEG was done " "on that admission, since patient was loaded with 2g IV LEV with return to baseline.     On close chart review, he had a couple of EEGs done during last admission in May, when the AI encephalopathy workup was done. A left temporal electrographic seizure was captured. In addition to brain MRI with bilateral MTS. Has been followed up closely with neuropsych for his persistent neurocognitive problems (memory deficits).     Interval history 12/20/23:   Mr. Jayesh Rose is a 44 y.o. male presenting for encephalitis follow up. Since last seen in clinic, we repeated an MRI (7/14) which showed improvement of his lesion. He saw neuropsychology as well: "Results predominantly showed significant impairment with memory (verbal and visual but visual was somewhat better). Also, findings noted increased trouble with visual-spatial skills (nonverbal reasoning, visual perception) and  increased variability with attention/working memory. Otherwise, scores were largely within expectations for verbal skills, most executive functions, mental speed, basic attention, and language measures." Since then he has noticed significant improvement in his memory (its been 6 months). He started having episodes of panic attacks which started after starting a SSRI but later improved after changing to a different SSRI. He did speech therapy with mild improvement. He is here by himself, wife helped with history taking over the phone.      Seizure Type 1:   Seizure Description: Paresthesias on bilateral upper extremities. Wakes up in the middle of the night confused and with diaphoresis   Aura: Tingling and burning sensation on face that goes to his arms   Associated Symptoms: remains confused and disoriented after the episodes   Seizure Frequency: +/- daily. Some improvement after increasing LEV 2g BID   First seizure: 10/18/20   Last seizure: 2 weeks prior to this visit; woke up confused in the middle of the night with paresthesias and " "diaphoresis  Seizure hx: premature birth  Prior AEDs: , 1000 and 1500 without proper compliance    Seizure Type 2:   Seizure Description:  Described as "convulsing" (motor/foaming/right side of face paralyzed) triggered by stress  Aura: Described as tingling on his face   Associated Symptoms:  none  Seizure Frequency: Started in 2020 but until 2023 his symptoms came back   Last seizure: Before 2023     Seizure Onset Age: 38ys   Seizure/ Epilepsy Risk Factors: none  Birth/Developmental History: abnormal birth history, since premature birth and Normal developmental history  Seizure Triggers/ Provoking Features: sleep deprivation, stress, and missed medications  Previous Seizure Medications: levetiracetam (Keppra, LEV)  Recent Med Changes: Started LCS on 2/19/25  Other Treatments: PRN Klonopin 0.5 for breakthrough seizures   Prior Episodes of Status: None  Psychiatric/Behavioral Comorbitidies: LUCI   Surgical Candidacy: Pending EMU admission for further steps     Prior Studies:  EEG : Ambulatory EEG (1/17/25) : intermittent slowing in both the left and right temporal regions.  This is nonspecific in regards to etiology and indicative of focal cortical dysfunction.  vEEG/ EMU evaluation: Pending EMU admission. Orders placed on 2/19/25 visit  MRI of brain: MRI Epilepsy (12/20/25): advanced volume loss of the hippocampal formations, there is the suspicion of increased signal likely representing underlying gliosis/mesial temporal sclerosis in light of prior imaging studies.  AED levels:  None   CT/CTA Scan:  Pending CT CAP since November 2024  PET Scan: None   Neuropsychological Evaluation: Previously seen by Neuropsych as outpatient   DEXA Scan: None   Other studies: AI encephalitis serology negative; AI encephalitis CSF negative          HPI 6/14/23:   Mr. Jayesh Rose is a 42 y.o. male presenting hospital follow up for encephalitis. He has a history of seizures and anxiety. Regarding his seizures, he was " told it was possibly temporal lobe epilepsy, there are no imaging on records, he is on keppra 1.500 (1g bid before we saw him in the hospital). He is here for hospital follow up of presumptive autoimmune encephalitis. He presented to [facility] around tucker ago after a GTC. In the ER him and his wife stated that he was also having some confusion and myalgias. WBC was elevated and there was some questionable neck pain. CT head unremarkable. LP done due to concerns for meningitis. CSF studies non specific so neurology was consulted. MRI brain w wo showed diffusion restriction and T2 flair hyper intensities on bilateral temporal lobes. Repeat LP ruled out infection and he was started on IVIG (5/24). He completed 5 days with mild subjective improvement while inpatient and was dc on day 5. Since then, there has been mild-mod improvement. Wife reached out to me last week and after discussing with Dr Berumen, we prescribed 3 days of high dose steroids. He is on day 2. Mild improvement noted     [facility]  Brain MRI     (11/20/24): Although there does not appear to be particularly advanced volume loss of the hippocampal formations, there is the suspicion of increased signal likely representing underlying gliosis/mesial temporal sclerosis in light of prior imaging studies.     (12/20/23): T2-FLAIR hyperintensity within the bilateral mesial temporal lobes. Subtle relative expansion of the bilateral temporal horns compared to initial imaging of 05/19/2023, may reflect resolution of prior edema versus atrophic change in this region.    PAST MEDICAL HISTORY:  Past Medical History:   Diagnosis Date    Anxiety 05/18/2023    Class 1 obesity in adult 05/18/2023    Depression     GERD (gastroesophageal reflux disease) 01/10/2019    Seizures        PAST SURGICAL HISTORY:  No past surgical history on file.    CURRENT MEDS:  Current Outpatient Medications   Medication Sig Dispense Refill    clonazePAM (KLONOPIN) 0.25 MG TbDL Take 2 tablets (0.5  mg total) by mouth 2 (two) times daily as needed (Rescue medication for seizures/seizure-like activity.). 60 tablet 5    guaifenesin (MUCINEX ORAL) Take 1 tablet by mouth 2 (two) times daily as needed (allergies).      ibuprofen (ADVIL,MOTRIN) 200 MG tablet Take 400-800 mg by mouth 2 (two) times daily as needed for Pain.      levETIRAcetam (KEPPRA) 1000 MG tablet Take 2 tablets (2,000 mg total) by mouth 2 (two) times daily. 120 tablet 2    ondansetron (ZOFRAN-ODT) 4 MG TbDL Take 1 tablet (4 mg total) by mouth every 8 (eight) hours as needed (for increased nausea and vomit). 120 tablet 1    sertraline (ZOLOFT) 100 MG tablet Take 1 tablet (100 mg total) by mouth once daily. 90 tablet 0    traZODone (DESYREL) 50 MG tablet Take 1 tablet (50 mg total) by mouth every evening. 30 tablet 1    lacosamide (VIMPAT) 100 mg Tab Take 1 tablet (100 mg total) by mouth every 12 (twelve) hours. 60 tablet 1    multivitamin (THERAGRAN) per tablet Take 1 tablet by mouth once daily.      omeprazole (PRILOSEC OTC) 20 MG tablet Take 20 mg by mouth daily as needed (heartburn).       No current facility-administered medications for this visit.       ALLERGIES:  Review of patient's allergies indicates:  No Known Allergies    FAMILY HISTORY:  Family History   Problem Relation Name Age of Onset    Mental illness Mother      Bipolar disorder Mother      Drug abuse Mother      Heart disease Father      Mental illness Father      Depression Father      Drug abuse Father      Heart disease Brother      Depression Brother      Suicide Brother      Parkinsonism Brother      Cancer Maternal Grandfather      Cancer Paternal Grandfather         SOCIAL HISTORY:  Social History     Tobacco Use    Smoking status: Never     Passive exposure: Never    Smokeless tobacco: Never   Substance Use Topics    Alcohol use: Yes     Comment: Occasional with his wife on date night    Drug use: No       Review of Systems:  12 system review of systems is negative except  "for the symptoms mentioned in HPI.      Objective:     Vitals:    02/19/25 1307   BP: 118/78   Pulse: (!) 57   Weight: 89.3 kg (196 lb 13.9 oz)   Height: 5' 10" (1.778 m)         General: NAD, well nourished   Eyes: no tearing, discharge, no erythema   ENT: moist mucous membranes of the oral cavity, nares patent    Neck: Supple, full range of motion  Cardiovascular: Warm and well perfused, pulses equal and symmetrical  Lungs: Normal work of breathing, normal chest wall excursions  Skin: No rash, lesions, or breakdown on exposed skin  Psychiatry: Mood and affect are appropriate   Abdomen: soft, non tender, non distended  Extremeties: No cyanosis, clubbing or edema.    Neurological   MENTAL STATUS: Alert and oriented to person, place, and time. Attention and concentration within normal limits. Speech without dysarthria, able to name and repeat without difficulty. Remote memory and recent memory impaired (recalled 1 word with no cue, 2 with cues).   CRANIAL NERVES: Visual fields intact. PERRL. EOMI. Facial sensation intact. Face symmetrical. Hearing grossly intact. Full shoulder shrug bilaterally. Tongue protrudes midline   SENSORY: Sensation is intact to pin throughout.  Joint position perception intact. Negative Romberg.   MOTOR: Normal bulk and tone. No pronator drift.  5/5 deltoid, biceps, triceps, interosseous, hand  bilaterally. 5/5 iliopsoas, knee extension/flexion, foot dorsi/plantarflexion bilaterally.    REFLEXES: Symmetric and 3+ throughout. Toes down going bilaterally. Bilateral campbell   CEREBELLAR/COORDINATION/GAIT: Gait steady with normal arm swing and stride length.  Heel to shin intact. Finger to nose intact. Normal rapid alternating movements.   "

## 2025-02-19 ENCOUNTER — OFFICE VISIT (OUTPATIENT)
Dept: NEUROLOGY | Facility: CLINIC | Age: 45
End: 2025-02-19
Payer: COMMERCIAL

## 2025-02-19 VITALS
WEIGHT: 196.88 LBS | SYSTOLIC BLOOD PRESSURE: 118 MMHG | HEART RATE: 57 BPM | DIASTOLIC BLOOD PRESSURE: 78 MMHG | BODY MASS INDEX: 28.18 KG/M2 | HEIGHT: 70 IN

## 2025-02-19 DIAGNOSIS — G04.81 AUTOIMMUNE ENCEPHALITIS: ICD-10-CM

## 2025-02-19 DIAGNOSIS — R56.9 SEIZURES: Primary | ICD-10-CM

## 2025-02-19 DIAGNOSIS — R41.3 MEMORY DEFICIT: ICD-10-CM

## 2025-02-19 RX ORDER — LACOSAMIDE 100 MG/1
100 TABLET ORAL EVERY 12 HOURS
Qty: 60 TABLET | Refills: 1 | Status: SHIPPED | OUTPATIENT
Start: 2025-02-19 | End: 2025-04-20

## 2025-02-20 ENCOUNTER — TELEPHONE (OUTPATIENT)
Dept: NEUROLOGY | Facility: CLINIC | Age: 45
End: 2025-02-20
Payer: COMMERCIAL

## 2025-02-25 ENCOUNTER — TELEPHONE (OUTPATIENT)
Dept: NEUROLOGY | Facility: CLINIC | Age: 45
End: 2025-02-25
Payer: COMMERCIAL

## 2025-03-17 ENCOUNTER — TELEPHONE (OUTPATIENT)
Dept: NEUROLOGY | Facility: CLINIC | Age: 45
End: 2025-03-17
Payer: COMMERCIAL

## 2025-03-17 NOTE — TELEPHONE ENCOUNTER
Spoke with patient about scheduling EMU. He is aware an adult is required to accompany him for the duration including overnight. He has a 4 year old that he and his wife have arranged their work schedule so one of them is always with the child so arranging this admission will need some thought- he has my direct line as POC for scheduling. I did explain that this order is good for a year so if there is a month or period time that works best he can let me know and I will be sure to contact him once that scheduling opens. V/U.

## 2025-03-23 DIAGNOSIS — F41.9 ANXIETY: ICD-10-CM

## 2025-03-23 DIAGNOSIS — F33.1 MODERATE EPISODE OF RECURRENT MAJOR DEPRESSIVE DISORDER: ICD-10-CM

## 2025-03-24 RX ORDER — SERTRALINE HYDROCHLORIDE 100 MG/1
100 TABLET, FILM COATED ORAL DAILY
Qty: 30 TABLET | Refills: 0 | Status: SHIPPED | OUTPATIENT
Start: 2025-03-24

## 2025-04-27 DIAGNOSIS — R56.9 SEIZURES: ICD-10-CM

## 2025-04-28 RX ORDER — LACOSAMIDE 100 MG/1
100 TABLET ORAL EVERY 12 HOURS
Qty: 60 TABLET | Refills: 1 | Status: SHIPPED | OUTPATIENT
Start: 2025-04-28 | End: 2025-06-27

## 2025-04-29 ENCOUNTER — PATIENT MESSAGE (OUTPATIENT)
Dept: PSYCHIATRY | Facility: CLINIC | Age: 45
End: 2025-04-29
Payer: COMMERCIAL

## 2025-04-29 DIAGNOSIS — F41.9 ANXIETY: ICD-10-CM

## 2025-04-29 DIAGNOSIS — F33.1 MODERATE EPISODE OF RECURRENT MAJOR DEPRESSIVE DISORDER: ICD-10-CM

## 2025-04-29 RX ORDER — SERTRALINE HYDROCHLORIDE 100 MG/1
100 TABLET, FILM COATED ORAL DAILY
Qty: 30 TABLET | Refills: 0 | Status: SHIPPED | OUTPATIENT
Start: 2025-04-29

## 2025-05-08 ENCOUNTER — OFFICE VISIT (OUTPATIENT)
Dept: PSYCHIATRY | Facility: CLINIC | Age: 45
End: 2025-05-08
Payer: COMMERCIAL

## 2025-05-08 DIAGNOSIS — F33.1 MODERATE EPISODE OF RECURRENT MAJOR DEPRESSIVE DISORDER: Primary | ICD-10-CM

## 2025-05-08 DIAGNOSIS — F41.9 ANXIETY: ICD-10-CM

## 2025-05-08 PROCEDURE — 98006 SYNCH AUDIO-VIDEO EST MOD 30: CPT | Mod: 95,,,

## 2025-05-08 PROCEDURE — 1159F MED LIST DOCD IN RCRD: CPT | Mod: CPTII,95,,

## 2025-05-08 PROCEDURE — 1160F RVW MEDS BY RX/DR IN RCRD: CPT | Mod: CPTII,95,,

## 2025-05-08 RX ORDER — SERTRALINE HYDROCHLORIDE 100 MG/1
150 TABLET, FILM COATED ORAL DAILY
Qty: 135 TABLET | Refills: 1 | Status: SHIPPED | OUTPATIENT
Start: 2025-05-08

## 2025-05-08 NOTE — PROGRESS NOTES
The patient location is: [address] [place] [id number]   The chief complaint leading to consultation is:     Visit type: audiovisual    Face to Face time with patient: 25  35 minutes of total time spent on the encounter, which includes face to face time and non-face to face time preparing to see the patient (eg, review of tests), Obtaining and/or reviewing separately obtained history, Documenting clinical information in the electronic or other health record, Independently interpreting results (not separately reported) and communicating results to the patient/family/caregiver, or Care coordination (not separately reported).         Each patient to whom he or she provides medical services by telemedicine is:  (1) informed of the relationship between the physician and patient and the respective role of any other health care provider with respect to management of the patient; and (2) notified that he or she may decline to receive medical services by telemedicine and may withdraw from such care at any time.    Notes:    Outpatient Psychiatry Follow-Up Visit (MD/NP)    5/8/2025    Clinical Status of Patient:  Outpatient (Ambulatory)    Chief Complaint:  Jayesh Rose is a 44 y.o. male who presents today for follow-up of depression and anxiety.  Met with patient.      Plan of care previous visit:   Increase sertraline (zoloft) 100 mg tablet; take 1 tablet (100 mg) daily to target anxiety and depression  Continue trazodone (desyrel) 50 MG; Take 1/2-1 tablet (25-50 mg) every night; as needed to target depression and off label insomnia  Keep all follow up appointments  Attend all neurology and neuropsychology appointments as scheduled    Interval History and Content of Current Session:  Interim Events/Subjective Report/Content of Current Session:   History of Present Illness    CHIEF COMPLAINT:  Patient presents for follow-up of ongoing depression, memory issues, and medication management.    HPI:  Patient  "reports ongoing memory loss, which he finds frustrating. He states, "I used to retain information easily, but now I can't." He has difficulty distinguishing between recent and past events, saying it's "hard to tell if something was two months ago or a year ago." To cope, he writes things down and takes inventory of information. Being followed by Neurology and was last seen 2/2025.     Patient lost his job with the [employer] due to his brain injury. He now works for [employer], where he's been employed for nearly a year. 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    Patient reports consistent depression. He experiences low moods, decreased interest in activities, and occasional crying spells. He feels isolated due to his work schedule and limited social interaction. Patient describes feeling emotionally fragile, stating it's "easier to make me one way or the other."    Sleep is described as scarce but heavy when it occurs. Patient has difficulty falling asleep, feeling he has many things to do and needing time to unwind. His sleep is also affected by his son coming into their room at night and his early work schedule.    Patient reports difficulty focusing, which has worsened since his hospitalization. He states, "It's hard for me to focus... since going into the hospital a lot more so than it used to be."    Patient is on a weight loss regimen, having reduced from 215 lbs to the 180-190 lb range. This has helped with snoring issues.    Patient is uncertain about his current medications, relying on his wife to manage them. He takes an AM and PM regimen but is unsure of the specifics. He believes he's taking Zoloft and trazodone as prescribed.    Patient denies thoughts about death, dying, suicide, or homicide. He denies feelings of hopelessness and use of marijuana or other illicit substances.    MEDICATIONS:  Patient is on Zoloft 100 mg taken daily orally for depression. He " also takes Trazodone 25-50 mg orally at night for sleep. He is on a weight loss shot.    SUBSTANCE USE:  Patient occasionally has a few beers a few times per week before bed, but not enough to get drunk. He uses caffeine, though the amount is not specified.    LIFESTYLE:  Patient lost his job with the [employer] due to a brain injury. He currently works for [employer], having started in August of last year. His current work schedule requires him to wake up at 3:45 AM daily, and he works alone in the studio. He had to handle some legal matters related to his job loss with the [employer] due to the brain injury. Patient lives with his wife and son. He reports feeling isolated due to his work schedule and has limited social interaction outside of work and family responsibilities.          Psychotherapy:  Target symptoms: depression  Why chosen therapy is appropriate versus another modality: relevant to diagnosis  Outcome monitoring methods: self-report, observation  Therapeutic intervention type: insight oriented psychotherapy, supportive psychotherapy  Topics discussed/themes: building skills sets for symptom management, symptom recognition  The patient's response to the intervention is accepting. The patient's progress toward treatment goals is good.   Duration of intervention: 6 minutes.    Review of Systems   PSYCHIATRIC: Pertinant items are noted in the narrative.    Past Medical, Family and Social History: The patient's past medical, family and social history have been reviewed and updated as appropriate within the electronic medical record - see encounter notes.    Compliance: yes    Side effects: None    Risk Parameters:  Patient reports no suicidal ideation  Patient reports no homicidal ideation  Patient reports no self-injurious behavior  Patient reports no violent behavior    Exam (detailed: at least 9 elements; comprehensive: all 15 elements)   Constitutional  Vitals:  Most recent vital signs, dated less than 90 days  prior to this appointment, were reviewed.   There were no vitals filed for this visit.     General:  unremarkable, age appropriate, casually dressed     Musculoskeletal  Muscle Strength/Tone:  not examined   Gait & Station:  Not examined     Psychiatric  Speech:  no latency; no press, spontaneous   Mood & Affect:  steady  congruent and appropriate   Thought Process:  normal and logical   Associations:  intact   Thought Content:  normal, no suicidality, no homicidality, delusions, or paranoia   Insight:  intact, has awareness of illness   Judgement: behavior is adequate to circumstances   Orientation:  grossly intact   Memory: intact for content of interview   Language: grossly intact   Attention Span & Concentration:  able to focus   Fund of Knowledge:  intact and appropriate to age and level of education     Assessment and Diagnosis   Status/Progress: Based on the examination today, the patient's problem(s) is/are adequately but not ideally controlled.  New problems have not been presented today.   Co-morbidities are not complicating management of the primary condition.  There are no active rule-out diagnoses for this patient at this time.     Assessment & Plan    IMPRESSION:  - Assessed current medication regimen, noting uncertainty about exact medications/dosages due to wife's management of medications.  - Evaluated sleep issues and continued Trazodone (dose uncertain, either 25 mg or 50 mg) at night for sleep, but deferred changes pending clarification of current dosage.  - Noted weight loss and use of weight loss medication.  - Assessed for suicidal ideation and substance use, finding no immediate concerns.        ICD-10-CM ICD-9-CM   1. Moderate episode of recurrent major depressive disorder  F33.1 296.32   2. Anxiety  F41.9 300.00       Intervention/Counseling/Treatment Plan   - Medication Management: Continue current medications. The risks and benefits of medication were discussed with the patient.  - Labs,  Diagnostic Studies: reviewed most recent reviewed    MEDICATIONS:  - Increased Zoloft from 100 mg to 150 mg daily (1.5 tablets) to address ongoing depression symptoms.  - Continued Trazodone (dose uncertain, either 25 mg or 50 mg) at night for sleep.    FOLLOW UP:  - Patient's wife to send a message through the portal regarding current sleep medication regimen for potential adjustments.  - Contact the office if any needs arise.       Safety: Patient has been educated on safety plan should any SI/HI, medication side effects &/or emergency arise. Patient verbalized understanding and agreement to contact a trusted friend or loved one, call 911 or go to nearest ER should any emergency occur.     VIRA Marshall    This note was generated with the assistance of ambient listening technology. Verbal consent was obtained by the patient and accompanying visitor(s) for the recording of patient appointment to facilitate this note. I attest to having reviewed and edited the generated note for accuracy, though some syntax or spelling errors may persist. Please contact the author of this note for any clarification.

## 2025-05-27 RX ORDER — LEVETIRACETAM 250 MG/1
2000 TABLET ORAL 2 TIMES DAILY
Qty: 480 TABLET | Refills: 5 | Status: SHIPPED | OUTPATIENT
Start: 2025-05-27 | End: 2025-11-23

## 2025-06-29 DIAGNOSIS — R56.9 SEIZURES: ICD-10-CM

## 2025-06-30 ENCOUNTER — PATIENT MESSAGE (OUTPATIENT)
Dept: NEUROLOGY | Facility: CLINIC | Age: 45
End: 2025-06-30
Payer: COMMERCIAL

## 2025-07-02 ENCOUNTER — NURSE TRIAGE (OUTPATIENT)
Dept: ADMINISTRATIVE | Facility: CLINIC | Age: 45
End: 2025-07-02
Payer: COMMERCIAL

## 2025-07-02 DIAGNOSIS — R56.9 SEIZURES: ICD-10-CM

## 2025-07-02 RX ORDER — LACOSAMIDE 100 MG/1
100 TABLET ORAL EVERY 12 HOURS
Qty: 60 TABLET | Refills: 3 | Status: SHIPPED | OUTPATIENT
Start: 2025-07-02

## 2025-07-02 NOTE — TELEPHONE ENCOUNTER
Pt's wife reports pt is a seizure pt, out of his med, has been requesting a refill since Sunday, but his doctor is out of town so it has not been done as of yet, but pt is out of the vimpat completely now. Call placed to [facility], Dr. Cifuentes, asked to be sent a secure chat and she would put in the refill for the pt. Secure chat was sent with pt's info. Wife informed and encouraged to call back with any worsening symptoms or questions. She verbalized understanding.    Reason for Disposition   [1] Prescription refill request for ESSENTIAL medicine (i.e., likelihood of harm to patient if not taken) AND [2] triager unable to refill per department policy    Protocols used: Medication Refill and Renewal Call-A-    
Airway patent, Nasal mucosa clear. Mouth with normal mucosa. Throat has no vesicles, no oropharyngeal exudates and uvula is midline.

## 2025-07-11 RX ORDER — LACOSAMIDE 100 MG/1
100 TABLET ORAL EVERY 12 HOURS
Qty: 60 TABLET | Refills: 1 | Status: SHIPPED | OUTPATIENT
Start: 2025-07-11 | End: 2025-09-09

## 2025-08-27 ENCOUNTER — OFFICE VISIT (OUTPATIENT)
Dept: PSYCHIATRY | Facility: CLINIC | Age: 45
End: 2025-08-27
Payer: COMMERCIAL

## 2025-08-27 DIAGNOSIS — F33.1 MODERATE EPISODE OF RECURRENT MAJOR DEPRESSIVE DISORDER: ICD-10-CM

## 2025-08-27 DIAGNOSIS — G47.00 INSOMNIA, UNSPECIFIED TYPE: ICD-10-CM

## 2025-08-27 DIAGNOSIS — F41.9 ANXIETY: Primary | ICD-10-CM

## 2025-08-27 PROCEDURE — 1159F MED LIST DOCD IN RCRD: CPT | Mod: CPTII,95,,

## 2025-08-27 PROCEDURE — G2211 COMPLEX E/M VISIT ADD ON: HCPCS | Mod: 95,,,

## 2025-08-27 PROCEDURE — 1160F RVW MEDS BY RX/DR IN RCRD: CPT | Mod: CPTII,95,,

## 2025-08-27 PROCEDURE — 98006 SYNCH AUDIO-VIDEO EST MOD 30: CPT | Mod: 95,,,

## 2025-08-27 RX ORDER — HYDROXYZINE HYDROCHLORIDE 25 MG/1
25 TABLET, FILM COATED ORAL NIGHTLY PRN
Qty: 30 TABLET | Refills: 2 | Status: SHIPPED | OUTPATIENT
Start: 2025-08-27

## 2025-08-27 RX ORDER — SERTRALINE HYDROCHLORIDE 100 MG/1
200 TABLET, FILM COATED ORAL DAILY
Qty: 180 TABLET | Refills: 1 | Status: SHIPPED | OUTPATIENT
Start: 2025-08-27